# Patient Record
Sex: MALE | Race: WHITE | NOT HISPANIC OR LATINO | Employment: OTHER | URBAN - METROPOLITAN AREA
[De-identification: names, ages, dates, MRNs, and addresses within clinical notes are randomized per-mention and may not be internally consistent; named-entity substitution may affect disease eponyms.]

---

## 2017-01-05 ENCOUNTER — HOSPITAL ENCOUNTER (OUTPATIENT)
Dept: RADIOLOGY | Facility: MEDICAL CENTER | Age: 67
DRG: 267 | End: 2017-01-05
Attending: INTERNAL MEDICINE | Admitting: INTERNAL MEDICINE
Payer: MEDICARE

## 2017-01-05 DIAGNOSIS — Z01.811 PRE-OPERATIVE RESPIRATORY EXAMINATION: ICD-10-CM

## 2017-01-05 DIAGNOSIS — Z01.812 PRE-OPERATIVE LABORATORY EXAMINATION: ICD-10-CM

## 2017-01-05 DIAGNOSIS — Z01.83 ENCOUNTER FOR BLOOD TYPING: ICD-10-CM

## 2017-01-05 DIAGNOSIS — Z01.810 PRE-OPERATIVE CARDIOVASCULAR EXAMINATION: ICD-10-CM

## 2017-01-05 LAB
ABO GROUP BLD: NORMAL
ALBUMIN SERPL BCP-MCNC: 4.2 G/DL (ref 3.2–4.9)
ALBUMIN/GLOB SERPL: 1.4 G/DL
ALP SERPL-CCNC: 54 U/L (ref 30–99)
ALT SERPL-CCNC: 23 U/L (ref 2–50)
ANION GAP SERPL CALC-SCNC: 6 MMOL/L (ref 0–11.9)
APPEARANCE UR: CLEAR
AST SERPL-CCNC: 20 U/L (ref 12–45)
BILIRUB SERPL-MCNC: 1.3 MG/DL (ref 0.1–1.5)
BILIRUB UR QL STRIP.AUTO: NEGATIVE
BLD GP AB SCN SERPL QL: NORMAL
BNP SERPL-MCNC: 155 PG/ML (ref 0–100)
BUN SERPL-MCNC: 16 MG/DL (ref 8–22)
CALCIUM SERPL-MCNC: 9.6 MG/DL (ref 8.5–10.5)
CHLORIDE SERPL-SCNC: 104 MMOL/L (ref 96–112)
CO2 SERPL-SCNC: 31 MMOL/L (ref 20–33)
COLOR UR: YELLOW
CREAT SERPL-MCNC: 0.92 MG/DL (ref 0.5–1.4)
CULTURE IF INDICATED INDCX: NO UA CULTURE
ERYTHROCYTE [DISTWIDTH] IN BLOOD BY AUTOMATED COUNT: 56.1 FL (ref 35.9–50)
GFR SERPL CREATININE-BSD FRML MDRD: >60 ML/MIN/1.73 M 2
GLOBULIN SER CALC-MCNC: 3 G/DL (ref 1.9–3.5)
GLUCOSE SERPL-MCNC: 106 MG/DL (ref 65–99)
GLUCOSE UR STRIP.AUTO-MCNC: NEGATIVE MG/DL
HCT VFR BLD AUTO: 41.3 % (ref 42–52)
HGB BLD-MCNC: 13 G/DL (ref 14–18)
HYALINE CASTS #/AREA URNS LPF: ABNORMAL /LPF
INR PPP: 1.3 (ref 0.87–1.13)
KETONES UR STRIP.AUTO-MCNC: NEGATIVE MG/DL
LEUKOCYTE ESTERASE UR QL STRIP.AUTO: NEGATIVE
MCH RBC QN AUTO: 29.1 PG (ref 27–33)
MCHC RBC AUTO-ENTMCNC: 31.5 G/DL (ref 33.7–35.3)
MCV RBC AUTO: 92.4 FL (ref 81.4–97.8)
MICRO URNS: ABNORMAL
MUCOUS THREADS #/AREA URNS HPF: ABNORMAL /HPF
NITRITE UR QL STRIP.AUTO: NEGATIVE
PH UR STRIP.AUTO: 5.5 [PH]
PLATELET # BLD AUTO: 143 K/UL (ref 164–446)
PMV BLD AUTO: 11.5 FL (ref 9–12.9)
POTASSIUM SERPL-SCNC: 3.6 MMOL/L (ref 3.6–5.5)
PROT SERPL-MCNC: 7.2 G/DL (ref 6–8.2)
PROT UR QL STRIP: 50 MG/DL
PROTHROMBIN TIME: 16.6 SEC (ref 12–14.6)
RBC # BLD AUTO: 4.47 M/UL (ref 4.7–6.1)
RBC # URNS HPF: ABNORMAL /HPF
RBC UR QL AUTO: NEGATIVE
RH BLD: NORMAL
SODIUM SERPL-SCNC: 141 MMOL/L (ref 135–145)
SP GR UR STRIP.AUTO: 1.02
WBC # BLD AUTO: 9.8 K/UL (ref 4.8–10.8)
WBC #/AREA URNS HPF: ABNORMAL /HPF

## 2017-01-05 PROCEDURE — 80053 COMPREHEN METABOLIC PANEL: CPT

## 2017-01-05 PROCEDURE — 86901 BLOOD TYPING SEROLOGIC RH(D): CPT

## 2017-01-05 PROCEDURE — 86900 BLOOD TYPING SEROLOGIC ABO: CPT

## 2017-01-05 PROCEDURE — 86850 RBC ANTIBODY SCREEN: CPT

## 2017-01-05 PROCEDURE — 83880 ASSAY OF NATRIURETIC PEPTIDE: CPT

## 2017-01-05 PROCEDURE — 81001 URINALYSIS AUTO W/SCOPE: CPT

## 2017-01-05 PROCEDURE — 85027 COMPLETE CBC AUTOMATED: CPT

## 2017-01-05 PROCEDURE — 85610 PROTHROMBIN TIME: CPT

## 2017-01-05 PROCEDURE — 36415 COLL VENOUS BLD VENIPUNCTURE: CPT

## 2017-01-05 PROCEDURE — 71020 DX-CHEST-2 VIEWS: CPT

## 2017-01-06 ENCOUNTER — RESOLUTE PROFESSIONAL BILLING HOSPITAL PROF FEE (OUTPATIENT)
Dept: CARDIOLOGY | Facility: MEDICAL CENTER | Age: 67
End: 2017-01-06
Payer: MEDICARE

## 2017-01-06 ENCOUNTER — TELEPHONE (OUTPATIENT)
Dept: CARDIOLOGY | Facility: MEDICAL CENTER | Age: 67
End: 2017-01-06

## 2017-01-06 LAB — EKG IMPRESSION: NORMAL

## 2017-01-06 NOTE — TELEPHONE ENCOUNTER
Spoke with pt's wife Mary going over TAVR preparation education. Explained that pt is to prep with CHG wipes Sunday night prior to procedure. Per Dr. Laguna patient is to begin holding Eliquis now, taking no Eliquis Sat or Sun.Also discussed pt will not eat or drink past midnight on Sunday EXCEPT Monday 1/9/17 @ 0500 he will still take his synthroid with just enough water to take his medication. Explained that pt should leave all jewelry and valuables at home, wear clothes that are easy to remove as he will be changing into hospital gown upon checking in. Patient can have family and friends in the pre-op area until he is brought back to OR. Patient's visitors are welcome to wait on first floor [place] during the procedure. Explained that once TAVR is complete Dr. Stanton, Dr. Laguna, and TUNDE Flowers will update them at first floor [place] waiting area. After update of procedure by team there will be approximately another 45 mins until family can come back to pt's room as patient will still be in transfer and being settled into the room. Explained that depending on patient recovery he can expect to d/c home as jose carlos as post op day one, longer if medically necessary. We discussed the option for cardiac rehab post operatively. Mary is interested in assuring that pt is enrolled in cardiac rehab post operatively to assure he has a great recovery. Mary states understanding and that she has no further questions at this time. Mary was encouraged to return call with any further questions or concerns.

## 2017-01-09 ENCOUNTER — APPOINTMENT (OUTPATIENT)
Dept: RADIOLOGY | Facility: MEDICAL CENTER | Age: 67
DRG: 267 | End: 2017-01-09
Attending: NURSE PRACTITIONER
Payer: MEDICARE

## 2017-01-09 PROCEDURE — 71010 DX-CHEST-PORTABLE (1 VIEW): CPT

## 2017-01-10 ENCOUNTER — APPOINTMENT (OUTPATIENT)
Dept: RADIOLOGY | Facility: MEDICAL CENTER | Age: 67
DRG: 267 | End: 2017-01-10
Attending: NURSE PRACTITIONER
Payer: MEDICARE

## 2017-01-10 PROBLEM — Z95.2 S/P TAVR (TRANSCATHETER AORTIC VALVE REPLACEMENT): Status: ACTIVE | Noted: 2017-01-10

## 2017-01-10 PROCEDURE — 99238 HOSP IP/OBS DSCHRG MGMT 30/<: CPT | Performed by: INTERNAL MEDICINE

## 2017-01-10 PROCEDURE — 71010 DX-CHEST-PORTABLE (1 VIEW): CPT

## 2017-01-13 ENCOUNTER — OFFICE VISIT (OUTPATIENT)
Dept: CARDIOLOGY | Facility: MEDICAL CENTER | Age: 67
End: 2017-01-13
Payer: MEDICARE

## 2017-01-13 VITALS
HEIGHT: 75 IN | SYSTOLIC BLOOD PRESSURE: 138 MMHG | DIASTOLIC BLOOD PRESSURE: 78 MMHG | OXYGEN SATURATION: 93 % | BODY MASS INDEX: 39.17 KG/M2 | HEART RATE: 77 BPM | WEIGHT: 315 LBS

## 2017-01-13 DIAGNOSIS — Z95.2 S/P TAVR (TRANSCATHETER AORTIC VALVE REPLACEMENT): ICD-10-CM

## 2017-01-13 DIAGNOSIS — I48.0 PAROXYSMAL ATRIAL FIBRILLATION (HCC): ICD-10-CM

## 2017-01-13 DIAGNOSIS — J44.89 OBSTRUCTIVE CHRONIC BRONCHITIS WITHOUT EXACERBATION: ICD-10-CM

## 2017-01-13 DIAGNOSIS — I10 ESSENTIAL HYPERTENSION, MALIGNANT: ICD-10-CM

## 2017-01-13 DIAGNOSIS — I82.409 DEEP VEIN THROMBOSIS (DVT) OF LOWER EXTREMITY, UNSPECIFIED CHRONICITY, UNSPECIFIED LATERALITY, UNSPECIFIED VEIN (HCC): ICD-10-CM

## 2017-01-13 DIAGNOSIS — I50.32 CHRONIC DIASTOLIC CONGESTIVE HEART FAILURE (HCC): ICD-10-CM

## 2017-01-13 DIAGNOSIS — Z79.01 CHRONIC ANTICOAGULATION: ICD-10-CM

## 2017-01-13 DIAGNOSIS — E78.5 DYSLIPIDEMIA: ICD-10-CM

## 2017-01-13 DIAGNOSIS — Z86.711 HISTORY OF PULMONARY EMBOLISM: ICD-10-CM

## 2017-01-13 LAB — EKG IMPRESSION: NORMAL

## 2017-01-13 PROCEDURE — 99214 OFFICE O/P EST MOD 30 MIN: CPT | Performed by: NURSE PRACTITIONER

## 2017-01-13 PROCEDURE — 93000 ELECTROCARDIOGRAM COMPLETE: CPT | Performed by: NURSE PRACTITIONER

## 2017-01-13 RX ORDER — NEOMYCIN SULFATE, POLYMYXIN B SULFATE, AND DEXAMETHASONE 3.5; 10000; 1 MG/G; [USP'U]/G; MG/G
OINTMENT OPHTHALMIC
COMMUNITY
Start: 2016-11-14 | End: 2017-01-29

## 2017-01-13 ASSESSMENT — ENCOUNTER SYMPTOMS
CLAUDICATION: 0
DIZZINESS: 0
PND: 0
SHORTNESS OF BREATH: 0
COUGH: 0
FEVER: 0
MYALGIAS: 0
ORTHOPNEA: 0
ABDOMINAL PAIN: 0
PALPITATIONS: 0

## 2017-01-13 NOTE — MR AVS SNAPSHOT
"        Praveen Khalil   2017 4:00 PM   Office Visit   MRN: [id number]    Department:  [facility]   Dept Phone:  [phone]    Description:  Male : 1950   Provider:  ANALY Garza           Reason for Visit     Follow-Up           Allergies as of 2017     Allergen Noted Reactions    Nkda [No Known Drug Allergy] 2007         You were diagnosed with     Paroxysmal atrial fibrillation (CMS-HCC)   [771158]       Chronic anticoagulation   [426419]       Chronic diastolic congestive heart failure (CMS-HCC)   [886104]       Deep vein thrombosis (DVT) of lower extremity, unspecified chronicity, unspecified laterality, unspecified vein ([facility])   [9309438]       Dyslipidemia   [500740]       Essential hypertension, malignant   [401.0.ICD-9-CM]       History of pulmonary embolism   [960794]       Obstructive chronic bronchitis without exacerbation (CMS-HCC)   [491.20.ICD-9-CM]       S/P TAVR (transcatheter aortic valve replacement)   [838942]         Vital Signs     Blood Pressure Pulse Height Weight Body Mass Index Oxygen Saturation    138/78 mmHg 77 1.905 m (6' 3\") 152.409 kg (336 lb) 42.00 kg/m2 93%    Smoking Status                   Former Smoker           Basic Information     Date Of Birth Sex Race Ethnicity Preferred Language    1950 Male White Non- English      Your appointments     2017 12:15 PM   ECHO with [facility]   [facility]CH[facility]ARDIOLOGY [facility])    [address]  [place] [address]   [phone]           No prep            2017  9:00 AM   [facility]    [address]  [place] [id number] [phone]              Problem List              ICD-10-CM Priority Class Noted - Resolved    Primary localized osteoarthrosis, pelvic region and thigh M16.10 Low  10/22/2013 - Present    History of pulmonary embolism Z86.711 Medium  " 10/24/2013 - Present    TAZ (obstructive sleep apnea) G47.33 Low  10/24/2013 - Present    Essential hypertension, malignant I10 Medium  10/24/2013 - Present    Gout, arthritis M10.9 Low  10/24/2013 - Present    Hypothyroidism E03.9 Low  10/24/2013 - Present    Obstructive chronic bronchitis without exacerbation (CMS-HCC) J44.9 Medium  10/24/2013 - Present    Obesity hypoventilation syndrome (CMS-HCC) E66.2 Low  10/24/2013 - Present    Neuropathy (CMS-HCC) G62.9 Low  10/24/2013 - Present    S/P hip replacement Z96.649 Low  10/24/2013 - Present    Dyslipidemia E78.5 Medium  10/24/2013 - Present    Chronic diastolic congestive heart failure (CMS-HCC) I50.32 Medium  2016 - Present    Atrial fibrillation (CMS-HCC) I48.91 Medium  2016 - Present    Chronic anticoagulation Z79.01 Medium  2016 - Present    DVT (deep venous thrombosis) (CMS-HCC) I82.409 Medium  2016 - Present    COPD (chronic obstructive pulmonary disease) (CMS-HCC) J44.9 Low  2016 - Present    S/P TAVR (transcatheter aortic valve replacement) Z95.2 Medium  1/10/2017 - Present      Health Maintenance        Date Due Completion Dates    IMM DTaP/Tdap/Td Vaccine (1 - Tdap) 1969 ---    COLONOSCOPY 2000 ---    IMM ZOSTER VACCINE 2010 ---    IMM PNEUMOCOCCAL 65+ (ADULT) LOW/MEDIUM RISK SERIES (2 of 2 - PPSV23) 10/23/2018 2016, 10/23/2013            Results     [facility] EKG (Clinic Performed)      Component    Report    [facility] [place] [facility]    Test Date:  2017  Pt Name:    BRINDA RODRÍGUEZ              Department: [facility]  MRN:        [id number]                      Room:  Gender:     M                            Technician: SHAHZAD  :        1950                   Requested By:GARCIA MARTINEZ  Order #:    [id number]                    Reading MD: Sidney Laguna MD    Measurements  Intervals                                Axis  Rate:       71                           P:  ID:                                       QRS:        -26  QRSD:       120                          T:          95  QT:         400  QTc:        435    Interpretive Statements  ATRIAL FIBRILLATION, V-RATE  56- 79  NONSPECIFIC INTRAVENTRICULAR CONDUCTION DELAY  BASELINE WANDER IN LEAD(S) V3  Compared to ECG 01/10/2017 07:17:34  No significant changes    Electronically Signed On 1- 17:00:34 PST by Sidney Laguna MD                          Current Immunizations     13-VALENT PCV PREVNAR 9/23/2016    Influenza Vaccine Quad Inj (Pf) 9/23/2016    Pneumococcal Vaccine (UF)Historical Data 8/1/2015    Pneumococcal polysaccharide vaccine (PPSV-23) 10/23/2013  6:29 AM      Below and/or attached are the medications your provider expects you to take. Review all of your home medications and newly ordered medications with your provider and/or pharmacist. Follow medication instructions as directed by your provider and/or pharmacist. Please keep your medication list with you and share with your provider. Update the information when medications are discontinued, doses are changed, or new medications (including over-the-counter products) are added; and carry medication information at all times in the event of emergency situations     Allergies:  NKDA - (reactions not documented)               Medications  Valid as of: January 13, 2017 -  5:01 PM    Generic Name Brand Name Tablet Size Instructions for use    Aliskiren Fumarate (Tab) TEKTURNA 300 MG Take 300 mg by mouth every day.        Allopurinol (Tab) ZYLOPRIM 300 MG Take 300 mg by mouth every day.        Apixaban (Tab) ELIQUIS 5mg Take 5 mg by mouth 2 Times a Day.        Aspirin (Tablet Delayed Response) ECOTRIN 81 MG Take 81 mg by mouth every day.        Bumetanide (Tab) BUMEX 1 MG Take 1 mg by mouth 2 times a day.        Calcium Carbonate-Vitamin D (Tab) OSCAL + D 500-200 MG-UNIT Take 2 Tabs by mouth every morning with breakfast.        Cholecalciferol (Tab) vitamin D 2000 UNIT Take 2,000 Units by mouth  every day.        Coenzyme Q10 (Cap) Coenzyme Q10 400 MG Take 1 Cap by mouth every day.        Digoxin (Tab) LANOXIN 250 MCG Take 250 mcg by mouth every day.        DiltiaZEM HCl (CAPSULE SR 24 HR) DILACOR  MG Take 240 mg by mouth every day.        Escitalopram Oxalate (Tab) LEXAPRO 10 MG Take 10 mg by mouth every day.        Glucos-MSM-C-Fk-Esgzpi-Aeqskq   Take 1 Tab by mouth every day.        Levothyroxine Sodium (Tab) SYNTHROID 50 MCG Take 50 mcg by mouth every day.        Metoprolol Tartrate (Tab) LOPRESSOR 50 MG Take 50 mg by mouth 3 times a day, with meals.        Multiple Vitamins-Minerals   Take 1 Tab by mouth every day.        Neomycin-Polymyxin-Dexameth (Ointment) MAXITROL 3.5-96129-9.1         Potassium Chloride (Pack) KLOR-CON 20 MEQ Take 20 mEq by mouth 2 times a day as needed. When taking lasix prn        Simvastatin (Tab) ZOCOR 40 MG Take 40 mg by mouth every evening.        Triazolam (Tab) HALCION 0.125 MG Take 0.125 mg by mouth at bedtime as needed.        .                 Medicines prescribed today were sent to:     [id number] - [place], [place] - [address] [address]    [address] [address] [address]    Phone: [phone] Fax: [phone]    Open 24 Hours?: No    [facility] - [place], [place] - [address] [place] [address]    [address] [address]    Phone: [phone] Fax: [phone]    Open 24 Hours?: No      Medication refill instructions:       If your prescription bottle indicates you have medication refills left, it is not necessary to call your provider’s office. Please contact your pharmacy and they will refill your medication.    If your prescription bottle indicates you do not have any refills left, you may request refills at any time through one of the following ways: The online Contentful system (except Urgent Care), by calling your provider’s office, or by asking your pharmacy to contact your provider’s office with a refill request. Medication refills  are processed only during regular business hours and may not be available until the next business day. Your provider may request additional information or to have a follow-up visit with you prior to refilling your medication.   *Please Note: Medication refills are assigned a new Rx number when refilled electronically. Your pharmacy may indicate that no refills were authorized even though a new prescription for the same medication is available at the pharmacy. Please request the medicine by name with the pharmacy before contacting your provider for a refill.        Referral     A referral request has been sent to our patient care coordination department. Please allow 3-5 business days for us to process this request and contact you either by phone or mail. If you do not hear from us by the 5th business day, please call us at [phone].           Trellise Access Code: [email or web address]  Expires: 2/9/2017  2:22 PM    Trellise  A secure, online tool to manage your health information     [employer]’s Trellise® is a secure, online tool that connects you to your personalized health information from the privacy of your home -- day or night - making it very easy for you to manage your healthcare. Once the activation process is completed, you can even access your medical information using the Trellise guerrero, which is available for free in the Apple Guerrero store or Google Play store.     Trellise provides the following levels of access (as shown below):   My Chart Features   [facility] Doctor [facility]  Urgent  Care Non-[facility]  Primary Care  Doctor   Email your healthcare team securely and privately 24/7 X X X    Manage appointments: schedule your next appointment; view details of past/upcoming appointments X      Request prescription refills. X      View recent personal medical records, including lab and immunizations X X X X   View health record, including health history, allergies, medications X X X X    Read reports about your outpatient visits, procedures, consult and ER notes X X X X   See your discharge summary, which is a recap of your hospital and/or ER visit that includes your diagnosis, lab results, and care plan. X X       How to register for Zervant:  1. Go to  [email or web address].  2. Click on the Sign Up Now box, which takes you to the New Member Sign Up page. You will need to provide the following information:  a. Enter your Zervant Access Code exactly as it appears at the top of this page. (You will not need to use this code after you’ve completed the sign-up process. If you do not sign up before the expiration date, you must request a new code.)   b. Enter your date of birth.   c. Enter your home email address.   d. Click Submit, and follow the next screen’s instructions.  3. Create a Zervant ID. This will be your Zervant login ID and cannot be changed, so think of one that is secure and easy to remember.  4. Create a Zervant password. You can change your password at any time.  5. Enter your Password Reset Question and Answer. This can be used at a later time if you forget your password.   6. Enter your e-mail address. This allows you to receive e-mail notifications when new information is available in Zervant.  7. Click Sign Up. You can now view your health information.    For assistance activating your Zervant account, call [phone]

## 2017-01-13 NOTE — Clinical Note
"     [facility]   [address]  [place] [address]  Phone: [phone]  Fax: [phone]              Praveen Khalil  1950    Encounter Date: 1/13/2017    ANALY Garza          PROGRESS NOTE:  Subjective:   Praveen Khalil is a 66 y.o. male who presents today for HFU S/P TAVR.    He is a patient of Dr. Ureña. Hx of DVT and PE, afib on Eliquis, severe AS with recent TAVR, TAZ with biPAP and oxygen, HTN, HLD, and obesity.    He is overall doing well. We discussed post-op care and his groin sites are observed and clean, dry, and intact.    He does continue to use his scooter for mobility but is able to use a walker. He wants to start getting healthier and losing weight.    He does have some peripheral edema. He does have exertional shortness of breath that is improving.    He has had no episodes of chest pain or palpitations.    Past Medical History   Diagnosis Date   • Pulmonary embolism (CMS-HCC)    • Hypertension    • Kidney stone    • Aortic stenosis      TAVR 1/9/17   • Anesthesia      \"Apnea at times with deep sedation\"    • Arthritis      osteo- hands   • Congestive heart failure (CMS-HCC)    • Sleep apnea      BiPAP with oxygen 2.5 L   • Cataract      bilat IOL    • Psychiatric problem      depression, anxiety    • Gout    • Atrial fibrillation (CMS-HCC)      A Fib    • Disorder of thyroid      Past Surgical History   Procedure Laterality Date   • Lumbar laminectomy diskectomy  2007   • Tonsillectomy     • Hip arthroplasty total  10/22/2013     Performed by Nader Barr M.D. at SURGERY [facility]   • Cataract extraction with iol     • Dupuytren contracture release Right 2014   • Transcatheter aortic valve replacement  1/9/2017     Procedure: TRANSCATHETER AORTIC VALVE REPLACEMENT WITH SCOUT;  Surgeon: Sidney Laguna M.D.;  Location: [facility];  Service:      Family History   Problem Relation Age of " Onset   • No Known Problems Mother    • No Known Problems Father      History   Smoking status   • Former Smoker -- 0.75 packs/day for 40 years   • Types: Cigarettes, Pipe   • Quit date: 03/01/2007   Smokeless tobacco   • Never Used     Comment: quit in march 2007     Allergies   Allergen Reactions   • Nkda [No Known Drug Allergy]      Outpatient Encounter Prescriptions as of 1/13/2017   Medication Sig Dispense Refill   • calcium carbonate-vitamin D (OSCAL 500/200 D-3) 500-200 MG-UNIT Tab Take 2 Tabs by mouth every morning with breakfast.     • metoprolol (LOPRESSOR) 50 MG Tab Take 50 mg by mouth 3 times a day, with meals.     • aspirin EC (ECOTRIN) 81 MG Tablet Delayed Response Take 81 mg by mouth every day.     • Cholecalciferol (VITAMIN D) 2000 UNIT Tab Take 2,000 Units by mouth every day.     • Coenzyme Q10 (CO Q-10 MAXIMUM STRENGTH) 400 MG Cap Take 1 Cap by mouth every day.     • bumetanide (BUMEX) 1 MG Tab Take 1 mg by mouth 2 times a day.     • digoxin (LANOXIN) 250 MCG Tab Take 250 mcg by mouth every day.     • diltiazem (DILACOR XR) 240 MG XR capsule Take 240 mg by mouth every day.     • apixaban (ELIQUIS) 5mg Tab Take 5 mg by mouth 2 Times a Day.     • escitalopram (LEXAPRO) 10 MG Tab Take 10 mg by mouth every day.     • triazolam (HALCION) 0.125 MG tablet Take 0.125 mg by mouth at bedtime as needed.     • levothyroxine (SYNTHROID) 50 MCG TABS Take 50 mcg by mouth every day.     • aliskiren (TEKTURNA) 300 MG tablet Take 300 mg by mouth every day.     • potassium chloride (KLOR-CON) 20 MEQ PACK Take 20 mEq by mouth 2 times a day as needed. When taking lasix prn     • allopurinol (ZYLOPRIM) 300 MG TABS Take 300 mg by mouth every day.     • simvastatin (ZOCOR) 40 MG TABS Take 40 mg by mouth every evening.     • Multiple Vitamins-Minerals (MULTIVITAL PO) Take 1 Tab by mouth every day.     • GLUCOSAMINE MSM COMPLEX PO Take 1 Tab by mouth every day.     • neomycin-polymixin-dexamethasone (MAXITROL) 3.5-02519-4.1  "Ointment ophthalmic ointment        No facility-administered encounter medications on file as of 1/13/2017.     Review of Systems   Constitutional: Negative for fever and malaise/fatigue.        Uses scooter for mobility    Respiratory: Negative for cough and shortness of breath.    Cardiovascular: Negative for chest pain, palpitations, orthopnea, claudication, leg swelling and PND.   Gastrointestinal: Negative for abdominal pain.   Musculoskeletal: Positive for joint pain. Negative for myalgias.        R ankle foot drop   Neurological: Negative for dizziness.   All other systems reviewed and are negative.       Objective:   /78 mmHg  Pulse 77  Ht 1.905 m (6' 3\")  Wt 152.409 kg (336 lb)  BMI 42.00 kg/m2  SpO2 93%    Physical Exam   Constitutional: He is oriented to person, place, and time. He appears well-developed. No distress.   obese   HENT:   Head: Normocephalic and atraumatic.   Eyes: EOM are normal.   Neck: Normal range of motion. No JVD present.   Cardiovascular: Normal rate, normal heart sounds, intact distal pulses and normal pulses.  An irregularly irregular rhythm present.   No murmur heard.  Pulmonary/Chest: Effort normal and breath sounds normal. No respiratory distress. He has no wheezes. He has no rales.   Abdominal: Soft. Bowel sounds are normal.   Musculoskeletal:   Mild edema bilateral lower extremities; scooter/walker for mobility    Neurological: He is alert and oriented to person, place, and time.   Skin: Skin is warm and dry.   Psychiatric: He has a normal mood and affect.   Nursing note and vitals reviewed.    Lab Results   Component Value Date/Time    CHOLESTEROL, 12/26/2009 05:30 AM    LDL 71 12/26/2009 05:30 AM    HDL 46 12/26/2009 05:30 AM    TRIGLYCERIDES 48 12/26/2009 05:30 AM       Lab Results   Component Value Date/Time    SODIUM 139 01/10/2017 02:26 AM    POTASSIUM 4.0 01/10/2017 02:26 AM    CHLORIDE 104 01/10/2017 02:26 AM    CO2 29 01/10/2017 02:26 AM    GLUCOSE " 146* 01/10/2017 02:26 AM    BUN 22 01/10/2017 02:26 AM    CREATININE 1.09 01/10/2017 02:26 AM    BUN-CREATININE RATIO 19 11/28/2016 02:41 PM     Lab Results   Component Value Date/Time    ALKALINE PHOSPHATASE 47 01/10/2017 02:26 AM    AST(SGOT) 20 01/10/2017 02:26 AM    ALT(SGPT) 15 01/10/2017 02:26 AM    TOTAL BILIRUBIN 1.2 01/10/2017 02:26 AM      2017 ONE DAY POST TAVR ECHO CONCLUSIONS  Normal left ventricular chamber size.  Left ventricular ejection fraction is visually estimated to be 65%.  Moderate concentric left ventricular hypertrophy.  Mild mitral stenosis.  Moderate mitral regurgitation.  Known TAVR aortic valve that is functioning normally with normal   transvalvular gradients.  Transvalvular gradients are - Peak: 19 mmHg, Mean: 12 mmHg.  No evidence of paravalvular leak noted.  Estimated right ventricular systolic pressure is 40 mmHg.    Assessment:     1. Paroxysmal atrial fibrillation (CMS-HCC)  REFERRAL TO INTENSIVE CARDIAC REHAB/CARDIAC REHAB    [facility] EKG (Clinic Performed)   2. Chronic anticoagulation  REFERRAL TO INTENSIVE CARDIAC REHAB/CARDIAC REHAB   3. Chronic diastolic congestive heart failure (CMS-HCC)  REFERRAL TO INTENSIVE CARDIAC REHAB/CARDIAC REHAB   4. Deep vein thrombosis (DVT) of lower extremity, unspecified chronicity, unspecified laterality, unspecified vein ([facility])  REFERRAL TO INTENSIVE CARDIAC REHAB/CARDIAC REHAB   5. Dyslipidemia  REFERRAL TO INTENSIVE CARDIAC REHAB/CARDIAC REHAB   6. Essential hypertension, malignant  REFERRAL TO INTENSIVE CARDIAC REHAB/CARDIAC REHAB   7. History of pulmonary embolism  REFERRAL TO INTENSIVE CARDIAC REHAB/CARDIAC REHAB   8. Obstructive chronic bronchitis without exacerbation (CMS-HCC)  REFERRAL TO INTENSIVE CARDIAC REHAB/CARDIAC REHAB   9. S/P TAVR (transcatheter aortic valve replacement)  REFERRAL TO INTENSIVE CARDIAC REHAB/CARDIAC REHAB     Medical Decision Making:  Today's Assessment / Status / Plan:     1. PAF, persistent and now rate  controlled on EKG at 70. Continue metoprolol 50 mg TID, dig 250 mcg, and diltiazem 240 mg QD. Eliquis for OAC. Asymptomatic to afib.    2. Diastolic HF, improving symptoms. Continue bumex 1 mg BID and K. Follow clinically and with repeat echo in 1 month.    3. DVT and PE, Eliquis lifelong.     4. HLD, statin. LDL goal <100 with questionable diabetes. FU with primary cardiologist for management.    5. COPD?, oxygen re-tested today in office. No longer needs oxygen with mobility but does need to continue at night per pulmonary recommendations. He will continue to monitor his pulse ox at home. FU with PCP regarding COPD diagnosis and abnormal PFT.    6. Severe AS with recent TAVR, doing well. Groin sites CDI. Repeat echo 1 month. Continue Eliquis BID and baby ASA (3 months only).    FU in clinic in 1 month with JI with repeat echo; FU with PCP regarding elevated blood sugars in the hospital.    Patient verbalizes understanding and agrees with the plan of care.     Collaborating MD: Luz Elena CHAND    Spent 45 minutes in face-to-face patient contact in which greater than 50% of the visit was spent in counseling/coordination of care of medication management, symptom management, re-assurance, discussion of post-op care, EKG done, groin sites evaluated, and discussion about future, echo ordered.        No Recipients

## 2017-01-14 NOTE — PROGRESS NOTES
"Subjective:   Praveen Khalil is a 66 y.o. male who presents today for HFU S/P TAVR.    He is a patient of Dr. Ureña. Hx of DVT and PE, afib on Eliquis, severe AS with recent TAVR, TAZ with biPAP and oxygen, HTN, HLD, and obesity.    He is overall doing well. We discussed post-op care and his groin sites are observed and clean, dry, and intact.    He does continue to use his scooter for mobility but is able to use a walker. He wants to start getting healthier and losing weight.    He does have some peripheral edema. He does have exertional shortness of breath that is improving.    He has had no episodes of chest pain or palpitations.    Past Medical History   Diagnosis Date   • Pulmonary embolism (CMS-HCC)    • Hypertension    • Kidney stone    • Aortic stenosis      TAVR 1/9/17   • Anesthesia      \"Apnea at times with deep sedation\"    • Arthritis      osteo- hands   • Congestive heart failure (CMS-HCC)    • Sleep apnea      BiPAP with oxygen 2.5 L   • Cataract      bilat IOL    • Psychiatric problem      depression, anxiety    • Gout    • Atrial fibrillation (CMS-HCC)      A Fib    • Disorder of thyroid      Past Surgical History   Procedure Laterality Date   • Lumbar laminectomy diskectomy  2007   • Tonsillectomy     • Hip arthroplasty total  10/22/2013     Performed by Nader Barr M.D. at SURGERY [facility]   • Cataract extraction with iol     • Dupuytren contracture release Right 2014   • Transcatheter aortic valve replacement  1/9/2017     Procedure: TRANSCATHETER AORTIC VALVE REPLACEMENT WITH SCOUT;  Surgeon: Sidney Laguna M.D.;  Location: SURGERY [facility];  Service:      Family History   Problem Relation Age of Onset   • No Known Problems Mother    • No Known Problems Father      History   Smoking status   • Former Smoker -- 0.75 packs/day for 40 years   • Types: Cigarettes, Pipe   • Quit date: 03/01/2007   Smokeless tobacco   • Never Used     Comment: quit in march 2007     Allergies "   Allergen Reactions   • Nkda [No Known Drug Allergy]      Outpatient Encounter Prescriptions as of 1/13/2017   Medication Sig Dispense Refill   • calcium carbonate-vitamin D (OSCAL 500/200 D-3) 500-200 MG-UNIT Tab Take 2 Tabs by mouth every morning with breakfast.     • metoprolol (LOPRESSOR) 50 MG Tab Take 50 mg by mouth 3 times a day, with meals.     • aspirin EC (ECOTRIN) 81 MG Tablet Delayed Response Take 81 mg by mouth every day.     • Cholecalciferol (VITAMIN D) 2000 UNIT Tab Take 2,000 Units by mouth every day.     • Coenzyme Q10 (CO Q-10 MAXIMUM STRENGTH) 400 MG Cap Take 1 Cap by mouth every day.     • bumetanide (BUMEX) 1 MG Tab Take 1 mg by mouth 2 times a day.     • digoxin (LANOXIN) 250 MCG Tab Take 250 mcg by mouth every day.     • diltiazem (DILACOR XR) 240 MG XR capsule Take 240 mg by mouth every day.     • apixaban (ELIQUIS) 5mg Tab Take 5 mg by mouth 2 Times a Day.     • escitalopram (LEXAPRO) 10 MG Tab Take 10 mg by mouth every day.     • triazolam (HALCION) 0.125 MG tablet Take 0.125 mg by mouth at bedtime as needed.     • levothyroxine (SYNTHROID) 50 MCG TABS Take 50 mcg by mouth every day.     • aliskiren (TEKTURNA) 300 MG tablet Take 300 mg by mouth every day.     • potassium chloride (KLOR-CON) 20 MEQ PACK Take 20 mEq by mouth 2 times a day as needed. When taking lasix prn     • allopurinol (ZYLOPRIM) 300 MG TABS Take 300 mg by mouth every day.     • simvastatin (ZOCOR) 40 MG TABS Take 40 mg by mouth every evening.     • Multiple Vitamins-Minerals (MULTIVITAL PO) Take 1 Tab by mouth every day.     • GLUCOSAMINE MSM COMPLEX PO Take 1 Tab by mouth every day.     • neomycin-polymixin-dexamethasone (MAXITROL) 3.5-22362-2.1 Ointment ophthalmic ointment        No facility-administered encounter medications on file as of 1/13/2017.     Review of Systems   Constitutional: Negative for fever and malaise/fatigue.        Uses scooter for mobility    Respiratory: Negative for cough and shortness of  "breath.    Cardiovascular: Negative for chest pain, palpitations, orthopnea, claudication, leg swelling and PND.   Gastrointestinal: Negative for abdominal pain.   Musculoskeletal: Positive for joint pain. Negative for myalgias.        R ankle foot drop   Neurological: Negative for dizziness.   All other systems reviewed and are negative.       Objective:   /78 mmHg  Pulse 77  Ht 1.905 m (6' 3\")  Wt 152.409 kg (336 lb)  BMI 42.00 kg/m2  SpO2 93%    Physical Exam   Constitutional: He is oriented to person, place, and time. He appears well-developed. No distress.   obese   HENT:   Head: Normocephalic and atraumatic.   Eyes: EOM are normal.   Neck: Normal range of motion. No JVD present.   Cardiovascular: Normal rate, normal heart sounds, intact distal pulses and normal pulses.  An irregularly irregular rhythm present.   No murmur heard.  Pulmonary/Chest: Effort normal and breath sounds normal. No respiratory distress. He has no wheezes. He has no rales.   Abdominal: Soft. Bowel sounds are normal.   Musculoskeletal:   Mild edema bilateral lower extremities; scooter/walker for mobility    Neurological: He is alert and oriented to person, place, and time.   Skin: Skin is warm and dry.   Psychiatric: He has a normal mood and affect.   Nursing note and vitals reviewed.    Lab Results   Component Value Date/Time    CHOLESTEROL, 12/26/2009 05:30 AM    LDL 71 12/26/2009 05:30 AM    HDL 46 12/26/2009 05:30 AM    TRIGLYCERIDES 48 12/26/2009 05:30 AM       Lab Results   Component Value Date/Time    SODIUM 139 01/10/2017 02:26 AM    POTASSIUM 4.0 01/10/2017 02:26 AM    CHLORIDE 104 01/10/2017 02:26 AM    CO2 29 01/10/2017 02:26 AM    GLUCOSE 146* 01/10/2017 02:26 AM    BUN 22 01/10/2017 02:26 AM    CREATININE 1.09 01/10/2017 02:26 AM    BUN-CREATININE RATIO 19 11/28/2016 02:41 PM     Lab Results   Component Value Date/Time    ALKALINE PHOSPHATASE 47 01/10/2017 02:26 AM    AST(SGOT) 20 01/10/2017 02:26 AM    " ALT(SGPT) 15 01/10/2017 02:26 AM    TOTAL BILIRUBIN 1.2 01/10/2017 02:26 AM      2017 ONE DAY POST TAVR ECHO CONCLUSIONS  Normal left ventricular chamber size.  Left ventricular ejection fraction is visually estimated to be 65%.  Moderate concentric left ventricular hypertrophy.  Mild mitral stenosis.  Moderate mitral regurgitation.  Known TAVR aortic valve that is functioning normally with normal   transvalvular gradients.  Transvalvular gradients are - Peak: 19 mmHg, Mean: 12 mmHg.  No evidence of paravalvular leak noted.  Estimated right ventricular systolic pressure is 40 mmHg.    Assessment:     1. Paroxysmal atrial fibrillation (CMS-HCC)  REFERRAL TO INTENSIVE CARDIAC REHAB/CARDIAC REHAB    [facility] (Clinic Performed)   2. Chronic anticoagulation  REFERRAL TO INTENSIVE CARDIAC REHAB/CARDIAC REHAB   3. Chronic diastolic congestive heart failure (CMS-[facility])  REFERRAL TO INTENSIVE CARDIAC REHAB/CARDIAC REHAB   4. Deep vein thrombosis (DVT) of lower extremity, unspecified chronicity, unspecified laterality, unspecified vein (HCC)  REFERRAL TO INTENSIVE CARDIAC REHAB/CARDIAC REHAB   5. Dyslipidemia  REFERRAL TO INTENSIVE CARDIAC REHAB/CARDIAC REHAB   6. Essential hypertension, malignant  REFERRAL TO INTENSIVE CARDIAC REHAB/CARDIAC REHAB   7. History of pulmonary embolism  REFERRAL TO INTENSIVE CARDIAC REHAB/CARDIAC REHAB   8. Obstructive chronic bronchitis without exacerbation (CMS-[facility])  REFERRAL TO INTENSIVE CARDIAC REHAB/CARDIAC REHAB   9. S/P TAVR (transcatheter aortic valve replacement)  REFERRAL TO INTENSIVE CARDIAC REHAB/CARDIAC REHAB     Medical Decision Making:  Today's Assessment / Status / Plan:     1. PAF, persistent and now rate controlled on EKG at 70. Continue metoprolol 50 mg TID, dig 250 mcg, and diltiazem 240 mg QD. Eliquis for OAC. Asymptomatic to afib.    2. Diastolic HF, improving symptoms. Continue bumex 1 mg BID and K. Follow clinically and with repeat echo in 1 month.    3. DVT and PE,  Eliquis lifelong.     4. HLD, statin. LDL goal <100 with questionable diabetes. FU with primary cardiologist for management.    5. COPD?, oxygen re-tested today in office. No longer needs oxygen with mobility but does need to continue at night per pulmonary recommendations. He will continue to monitor his pulse ox at home. FU with PCP regarding COPD diagnosis and abnormal PFT.    6. Severe AS with recent TAVR, doing well. Groin sites CDI. Repeat echo 1 month. Continue Eliquis BID and baby ASA (3 months only).    FU in clinic in 1 month with JI with repeat echo; FU with PCP regarding elevated blood sugars in the hospital.    Patient verbalizes understanding and agrees with the plan of care.     Collaborating MD: Luz Elena CHAND    Spent 45 minutes in face-to-face patient contact in which greater than 50% of the visit was spent in counseling/coordination of care of medication management, symptom management, re-assurance, discussion of post-op care, EKG done, groin sites evaluated, and discussion about future, echo ordered.

## 2017-01-20 ENCOUNTER — TELEPHONE (OUTPATIENT)
Dept: CARDIOLOGY | Facility: MEDICAL CENTER | Age: 67
End: 2017-01-20

## 2017-01-20 NOTE — TELEPHONE ENCOUNTER
----- Message from Rand Watson sent at 1/20/2017  9:40 AM PST -----  Regarding: patient wants a call to discuss cardiac rehab  SC/Raul        Patient would like a call back. He said [facility]el had told him that cardiac rehab would be a 6 week program, he called them and was told it was a 12 week program, and he has a problem with that. He would like a call back at [phone]

## 2017-01-20 NOTE — TELEPHONE ENCOUNTER
S/w SC about this pts concerns. Discussed that intensive cardiac rehab is now offering a 12 week program as this has showed to produce better results and many insurance companies are covering the full 12 weeks. Explained to pt, as SC has suggested when discussing with her, pt can do as much rehab as he wants. Pt also has questions about whether he can just do the physical therapy portion as he doesn't feel the education portion applies to him. Explained that the other components of rehab could be very useful. Advised pt that specific questions would have to call and ask cardiac rehab specifically. Pt appreciates.

## 2017-01-29 ENCOUNTER — HOSPITAL ENCOUNTER (INPATIENT)
Facility: MEDICAL CENTER | Age: 67
LOS: 3 days | DRG: 227 | End: 2017-02-01
Attending: EMERGENCY MEDICINE | Admitting: INTERNAL MEDICINE
Payer: MEDICARE

## 2017-01-29 ENCOUNTER — RESOLUTE PROFESSIONAL BILLING HOSPITAL PROF FEE (OUTPATIENT)
Dept: CARDIOLOGY | Facility: MEDICAL CENTER | Age: 67
End: 2017-01-29
Payer: MEDICARE

## 2017-01-29 ENCOUNTER — APPOINTMENT (OUTPATIENT)
Dept: RADIOLOGY | Facility: MEDICAL CENTER | Age: 67
DRG: 227 | End: 2017-01-29
Attending: EMERGENCY MEDICINE
Payer: MEDICARE

## 2017-01-29 DIAGNOSIS — I20.9 ISCHEMIC CHEST PAIN (HCC): ICD-10-CM

## 2017-01-29 DIAGNOSIS — R55 SYNCOPE, UNSPECIFIED SYNCOPE TYPE: ICD-10-CM

## 2017-01-29 DIAGNOSIS — I47.20 VENTRICULAR TACHYCARDIA (HCC): ICD-10-CM

## 2017-01-29 LAB
ALBUMIN SERPL BCP-MCNC: 4 G/DL (ref 3.2–4.9)
ALBUMIN/GLOB SERPL: 1.3 G/DL
ALP SERPL-CCNC: 66 U/L (ref 30–99)
ALT SERPL-CCNC: 53 U/L (ref 2–50)
ANION GAP SERPL CALC-SCNC: 9 MMOL/L (ref 0–11.9)
APTT PPP: 28.2 SEC (ref 24.7–36)
AST SERPL-CCNC: 70 U/L (ref 12–45)
BASOPHILS # BLD AUTO: 0.6 % (ref 0–1.8)
BASOPHILS # BLD: 0.05 K/UL (ref 0–0.12)
BILIRUB SERPL-MCNC: 0.8 MG/DL (ref 0.1–1.5)
BNP SERPL-MCNC: 106 PG/ML (ref 0–100)
BUN SERPL-MCNC: 26 MG/DL (ref 8–22)
CALCIUM SERPL-MCNC: 9.5 MG/DL (ref 8.5–10.5)
CHLORIDE SERPL-SCNC: 104 MMOL/L (ref 96–112)
CO2 SERPL-SCNC: 26 MMOL/L (ref 20–33)
CREAT SERPL-MCNC: 1.15 MG/DL (ref 0.5–1.4)
DIGOXIN SERPL-MCNC: 0.8 NG/ML (ref 0.8–2)
EKG IMPRESSION: NORMAL
EOSINOPHIL # BLD AUTO: 0.25 K/UL (ref 0–0.51)
EOSINOPHIL NFR BLD: 2.8 % (ref 0–6.9)
ERYTHROCYTE [DISTWIDTH] IN BLOOD BY AUTOMATED COUNT: 56.7 FL (ref 35.9–50)
GFR SERPL CREATININE-BSD FRML MDRD: >60 ML/MIN/1.73 M 2
GLOBULIN SER CALC-MCNC: 3 G/DL (ref 1.9–3.5)
GLUCOSE SERPL-MCNC: 153 MG/DL (ref 65–99)
HCT VFR BLD AUTO: 37.4 % (ref 42–52)
HGB BLD-MCNC: 11.9 G/DL (ref 14–18)
IMM GRANULOCYTES # BLD AUTO: 0.06 K/UL (ref 0–0.11)
IMM GRANULOCYTES NFR BLD AUTO: 0.7 % (ref 0–0.9)
INR PPP: 1.23 (ref 0.87–1.13)
LYMPHOCYTES # BLD AUTO: 1.33 K/UL (ref 1–4.8)
LYMPHOCYTES NFR BLD: 14.6 % (ref 22–41)
MAGNESIUM SERPL-MCNC: 2 MG/DL (ref 1.5–2.5)
MCH RBC QN AUTO: 28.6 PG (ref 27–33)
MCHC RBC AUTO-ENTMCNC: 31.8 G/DL (ref 33.7–35.3)
MCV RBC AUTO: 89.9 FL (ref 81.4–97.8)
MONOCYTES # BLD AUTO: 0.67 K/UL (ref 0–0.85)
MONOCYTES NFR BLD AUTO: 7.4 % (ref 0–13.4)
NEUTROPHILS # BLD AUTO: 6.72 K/UL (ref 1.82–7.42)
NEUTROPHILS NFR BLD: 73.9 % (ref 44–72)
NRBC # BLD AUTO: 0 K/UL
NRBC BLD AUTO-RTO: 0 /100 WBC
PLATELET # BLD AUTO: 107 K/UL (ref 164–446)
PMV BLD AUTO: 10.8 FL (ref 9–12.9)
POTASSIUM SERPL-SCNC: 3.4 MMOL/L (ref 3.6–5.5)
PROT SERPL-MCNC: 7 G/DL (ref 6–8.2)
PROTHROMBIN TIME: 15.9 SEC (ref 12–14.6)
RBC # BLD AUTO: 4.16 M/UL (ref 4.7–6.1)
SODIUM SERPL-SCNC: 139 MMOL/L (ref 135–145)
TROPONIN I SERPL-MCNC: 0.02 NG/ML (ref 0–0.04)
TROPONIN I SERPL-MCNC: 0.37 NG/ML (ref 0–0.04)
TSH SERPL DL<=0.005 MIU/L-ACNC: 2.81 UIU/ML (ref 0.3–3.7)
WBC # BLD AUTO: 9.1 K/UL (ref 4.8–10.8)

## 2017-01-29 PROCEDURE — A9270 NON-COVERED ITEM OR SERVICE: HCPCS | Performed by: INTERNAL MEDICINE

## 2017-01-29 PROCEDURE — 93005 ELECTROCARDIOGRAM TRACING: CPT

## 2017-01-29 PROCEDURE — 99223 1ST HOSP IP/OBS HIGH 75: CPT | Mod: 25 | Performed by: INTERNAL MEDICINE

## 2017-01-29 PROCEDURE — 84443 ASSAY THYROID STIM HORMONE: CPT

## 2017-01-29 PROCEDURE — 71010 DX-CHEST-LIMITED (1 VIEW): CPT

## 2017-01-29 PROCEDURE — 36415 COLL VENOUS BLD VENIPUNCTURE: CPT

## 2017-01-29 PROCEDURE — 93005 ELECTROCARDIOGRAM TRACING: CPT | Performed by: INTERNAL MEDICINE

## 2017-01-29 PROCEDURE — 99291 CRITICAL CARE FIRST HOUR: CPT

## 2017-01-29 PROCEDURE — 83880 ASSAY OF NATRIURETIC PEPTIDE: CPT

## 2017-01-29 PROCEDURE — 770022 HCHG ROOM/CARE - ICU (200)

## 2017-01-29 PROCEDURE — 85025 COMPLETE CBC W/AUTO DIFF WBC: CPT

## 2017-01-29 PROCEDURE — 93010 ELECTROCARDIOGRAM REPORT: CPT | Performed by: INTERNAL MEDICINE

## 2017-01-29 PROCEDURE — 85610 PROTHROMBIN TIME: CPT

## 2017-01-29 PROCEDURE — 99291 CRITICAL CARE FIRST HOUR: CPT | Mod: AI | Performed by: INTERNAL MEDICINE

## 2017-01-29 PROCEDURE — 700102 HCHG RX REV CODE 250 W/ 637 OVERRIDE(OP): Performed by: INTERNAL MEDICINE

## 2017-01-29 PROCEDURE — 83735 ASSAY OF MAGNESIUM: CPT

## 2017-01-29 PROCEDURE — 80162 ASSAY OF DIGOXIN TOTAL: CPT

## 2017-01-29 PROCEDURE — 700111 HCHG RX REV CODE 636 W/ 250 OVERRIDE (IP): Performed by: INTERNAL MEDICINE

## 2017-01-29 PROCEDURE — 80053 COMPREHEN METABOLIC PANEL: CPT

## 2017-01-29 PROCEDURE — 84484 ASSAY OF TROPONIN QUANT: CPT

## 2017-01-29 PROCEDURE — 85730 THROMBOPLASTIN TIME PARTIAL: CPT

## 2017-01-29 RX ORDER — POTASSIUM CHLORIDE 20 MEQ/1
60 TABLET, EXTENDED RELEASE ORAL ONCE
Status: COMPLETED | OUTPATIENT
Start: 2017-01-29 | End: 2017-01-29

## 2017-01-29 RX ORDER — CALCIUM CARBONATE 500(1250)
1000 TABLET ORAL
Status: DISCONTINUED | OUTPATIENT
Start: 2017-01-30 | End: 2017-02-01 | Stop reason: HOSPADM

## 2017-01-29 RX ORDER — ONDANSETRON 2 MG/ML
4 INJECTION INTRAMUSCULAR; INTRAVENOUS EVERY 4 HOURS PRN
Status: DISCONTINUED | OUTPATIENT
Start: 2017-01-29 | End: 2017-02-01 | Stop reason: HOSPADM

## 2017-01-29 RX ORDER — MAGNESIUM SULFATE HEPTAHYDRATE 40 MG/ML
2 INJECTION, SOLUTION INTRAVENOUS ONCE
Status: COMPLETED | OUTPATIENT
Start: 2017-01-29 | End: 2017-01-30

## 2017-01-29 RX ORDER — DILTIAZEM HYDROCHLORIDE 240 MG/1
240 CAPSULE, COATED, EXTENDED RELEASE ORAL EVERY MORNING
Status: DISCONTINUED | OUTPATIENT
Start: 2017-01-30 | End: 2017-01-31

## 2017-01-29 RX ORDER — POTASSIUM CHLORIDE 7.45 MG/ML
10 INJECTION INTRAVENOUS
Status: COMPLETED | OUTPATIENT
Start: 2017-01-29 | End: 2017-01-30

## 2017-01-29 RX ORDER — DEXTROSE MONOHYDRATE 50 MG/ML
500 INJECTION, SOLUTION INTRAVENOUS CONTINUOUS
Status: DISCONTINUED | OUTPATIENT
Start: 2017-01-29 | End: 2017-01-31

## 2017-01-29 RX ORDER — BUMETANIDE 1 MG/1
1 TABLET ORAL
Status: DISCONTINUED | OUTPATIENT
Start: 2017-01-30 | End: 2017-02-01 | Stop reason: HOSPADM

## 2017-01-29 RX ORDER — DIGOXIN 250 MCG
250 TABLET ORAL EVERY MORNING
Status: DISCONTINUED | OUTPATIENT
Start: 2017-01-30 | End: 2017-01-31

## 2017-01-29 RX ORDER — M-VIT,TX,IRON,MINS/CALC/FOLIC 27MG-0.4MG
1 TABLET ORAL DAILY
Status: DISCONTINUED | OUTPATIENT
Start: 2017-01-30 | End: 2017-02-01 | Stop reason: HOSPADM

## 2017-01-29 RX ORDER — ESCITALOPRAM OXALATE 10 MG/1
10 TABLET ORAL DAILY
Status: DISCONTINUED | OUTPATIENT
Start: 2017-01-30 | End: 2017-02-01 | Stop reason: HOSPADM

## 2017-01-29 RX ORDER — POTASSIUM CHLORIDE 20 MEQ/1
20 TABLET, EXTENDED RELEASE ORAL 2 TIMES DAILY
COMMUNITY
End: 2017-02-14 | Stop reason: SDUPTHER

## 2017-01-29 RX ORDER — ALLOPURINOL 300 MG/1
300 TABLET ORAL EVERY MORNING
Status: DISCONTINUED | OUTPATIENT
Start: 2017-01-30 | End: 2017-02-01 | Stop reason: HOSPADM

## 2017-01-29 RX ORDER — ONDANSETRON 4 MG/1
4 TABLET, ORALLY DISINTEGRATING ORAL EVERY 4 HOURS PRN
Status: DISCONTINUED | OUTPATIENT
Start: 2017-01-29 | End: 2017-02-01 | Stop reason: HOSPADM

## 2017-01-29 RX ORDER — ACETAMINOPHEN 325 MG/1
650 TABLET ORAL EVERY 6 HOURS PRN
Status: DISCONTINUED | OUTPATIENT
Start: 2017-01-29 | End: 2017-02-01 | Stop reason: HOSPADM

## 2017-01-29 RX ORDER — POTASSIUM CHLORIDE 20 MEQ/1
20 TABLET, EXTENDED RELEASE ORAL 2 TIMES DAILY
Status: DISCONTINUED | OUTPATIENT
Start: 2017-01-30 | End: 2017-01-31

## 2017-01-29 RX ORDER — SIMVASTATIN 20 MG
40 TABLET ORAL NIGHTLY
Status: DISCONTINUED | OUTPATIENT
Start: 2017-01-29 | End: 2017-02-01 | Stop reason: HOSPADM

## 2017-01-29 RX ORDER — LEVOTHYROXINE SODIUM 0.05 MG/1
50 TABLET ORAL
Status: DISCONTINUED | OUTPATIENT
Start: 2017-01-30 | End: 2017-02-01 | Stop reason: HOSPADM

## 2017-01-29 RX ORDER — METOPROLOL TARTRATE 50 MG/1
50 TABLET, FILM COATED ORAL
Status: DISCONTINUED | OUTPATIENT
Start: 2017-01-29 | End: 2017-02-01 | Stop reason: HOSPADM

## 2017-01-29 RX ORDER — ALISKIREN 300 MG/1
300 TABLET, FILM COATED ORAL DAILY
Status: DISCONTINUED | OUTPATIENT
Start: 2017-01-29 | End: 2017-01-29

## 2017-01-29 RX ORDER — ALISKIREN 150 MG/1
300 TABLET, FILM COATED ORAL
Status: DISCONTINUED | OUTPATIENT
Start: 2017-01-29 | End: 2017-02-01 | Stop reason: HOSPADM

## 2017-01-29 RX ADMIN — POTASSIUM CHLORIDE 10 MEQ: 10 INJECTION, SOLUTION INTRAVENOUS at 22:42

## 2017-01-29 RX ADMIN — METOPROLOL TARTRATE 50 MG: 50 TABLET, FILM COATED ORAL at 22:53

## 2017-01-29 RX ADMIN — MAGNESIUM SULFATE IN WATER 2 G: 40 INJECTION, SOLUTION INTRAVENOUS at 22:42

## 2017-01-29 RX ADMIN — APIXABAN 5 MG: 2.5 TABLET, FILM COATED ORAL at 22:54

## 2017-01-29 RX ADMIN — POTASSIUM CHLORIDE 10 MEQ: 10 INJECTION, SOLUTION INTRAVENOUS at 23:42

## 2017-01-29 RX ADMIN — SIMVASTATIN 40 MG: 20 TABLET, FILM COATED ORAL at 22:53

## 2017-01-29 RX ADMIN — DEXTROSE MONOHYDRATE 500 ML: 50 INJECTION, SOLUTION INTRAVENOUS at 22:42

## 2017-01-29 RX ADMIN — POTASSIUM CHLORIDE 60 MEQ: 1500 TABLET, EXTENDED RELEASE ORAL at 22:52

## 2017-01-29 ASSESSMENT — LIFESTYLE VARIABLES
EVER_SMOKED: YES
ALCOHOL_USE: YES
TOTAL SCORE: 0
CONSUMPTION TOTAL: NEGATIVE
EVER_SMOKED: YES
TOTAL SCORE: 0
HAVE YOU EVER FELT YOU SHOULD CUT DOWN ON YOUR DRINKING: NO
AVERAGE NUMBER OF DAYS PER WEEK YOU HAVE A DRINK CONTAINING ALCOHOL: 0
EVER FELT BAD OR GUILTY ABOUT YOUR DRINKING: NO
EVER HAD A DRINK FIRST THING IN THE MORNING TO STEADY YOUR NERVES TO GET RID OF A HANGOVER: NO
HAVE PEOPLE ANNOYED YOU BY CRITICIZING YOUR DRINKING: NO
TOTAL SCORE: 0
ON A TYPICAL DAY WHEN YOU DRINK ALCOHOL HOW MANY DRINKS DO YOU HAVE: 2
HOW MANY TIMES IN THE PAST YEAR HAVE YOU HAD 5 OR MORE DRINKS IN A DAY: 0

## 2017-01-29 ASSESSMENT — COPD QUESTIONNAIRES
DO YOU EVER COUGH UP ANY MUCUS OR PHLEGM?: NO/ONLY WITH OCCASIONAL COLDS OR INFECTIONS
COPD SCREENING SCORE: 5
HAVE YOU SMOKED AT LEAST 100 CIGARETTES IN YOUR ENTIRE LIFE: YES
DURING THE PAST 4 WEEKS HOW MUCH DID YOU FEEL SHORT OF BREATH: SOME OF THE TIME

## 2017-01-29 ASSESSMENT — PAIN SCALES - GENERAL: PAINLEVEL_OUTOF10: 0

## 2017-01-29 NOTE — IP AVS SNAPSHOT
PopUpsters Access Code: [email or web address]  Expires: 2/9/2017  2:22 PM    PopUpsters  A secure, online tool to manage your health information     [employer]’s PopUpsters® is a secure, online tool that connects you to your personalized health information from the privacy of your home -- day or night - making it very easy for you to manage your healthcare. Once the activation process is completed, you can even access your medical information using the PopUpsters guerrero, which is available for free in the Apple Guerrero store or Google Play store.     PopUpsters provides the following levels of access (as shown below):   My Chart Features   [facility] Primary Care Doctor [facility]  Specialists [facility]  Urgent  Care Non-[facility]  Primary Care  Doctor   Email your healthcare team securely and privately 24/7 X X X X   Manage appointments: schedule your next appointment; view details of past/upcoming appointments X      Request prescription refills. X      View recent personal medical records, including lab and immunizations X X X X   View health record, including health history, allergies, medications X X X X   Read reports about your outpatient visits, procedures, consult and ER notes X X X X   See your discharge summary, which is a recap of your hospital and/or ER visit that includes your diagnosis, lab results, and care plan. X X       How to register for PopUpsters:  1. Go to  [email or web address].  2. Click on the Sign Up Now box, which takes you to the New Member Sign Up page. You will need to provide the following information:  a. Enter your PopUpsters Access Code exactly as it appears at the top of this page. (You will not need to use this code after you’ve completed the sign-up process. If you do not sign up before the expiration date, you must request a new code.)   b. Enter your date of birth.   c. Enter your home email address.   d. Click Submit, and follow the next screen’s instructions.  3. Create a PopUpsters ID. This will be your ClrToucht  login ID and cannot be changed, so think of one that is secure and easy to remember.  4. Create a [employer]ADE password. You can change your password at any time.  5. Enter your Password Reset Question and Answer. This can be used at a later time if you forget your password.   6. Enter your e-mail address. This allows you to receive e-mail notifications when new information is available in NEBOTRADE.  7. Click Sign Up. You can now view your health information.    For assistance activating your NEBOTRADE account, call [phone]

## 2017-01-29 NOTE — IP AVS SNAPSHOT
" <p align=\"LEFT\"><IMG SRC=\"//[email or web address]\" alt=\"Image\" WIDTH=\"50%\" HEIGHT=\"200\" BORDER=\"\"></p>                   Name:Praveen Khalil  Medical Record Number:[id number]  CSN: [id number]    YOB: 1950   Age: 66 y.o.  Sex: male  HT:1.905 m (6' 3\") WT: 165.9 kg (365 lb 11.9 oz)          Admit Date: 1/29/2017     Discharge Date:   Today's Date: 2/1/2017  Attending Doctor:  Boom Houston M.D.                  Allergies:  Nkda          Your appointments     Feb 07, 2017  1:00 PM   ORIENTATION with ICR RN   [facility]    [address]  [address]  [facility] [id number] [phone]            Feb 14, 2017  9:00 AM   TAVR Established Patient with Sidney Laguna M.D.   [facility] (--)    [address]  [place] [id number] [phone]              Follow-up Information     1. Follow up with Taiwo Ureña M.D.. Schedule an appointment as soon as possible for a visit in 1 week.    Specialty:  Cardiology    Contact information    [address]  [address]  [facility] [id number] [phone]           Medication List      Take these Medications        Instructions    allopurinol 300 MG Tabs   Commonly known as:  ZYLOPRIM    Take 300 mg by mouth every morning.   Dose:  300 mg       amiodarone 400 MG tablet   Commonly known as:  PACERONE    One tab by mouth twice daily until Feb 10; on that day change to one pill daily       aspirin EC 81 MG Tbec   Commonly known as:  ECOTRIN    Take 81 mg by mouth every morning.   Dose:  81 mg       BUMEX 1 MG Tabs   Generic drug:  bumetanide    Take 1 mg by mouth 2 times a day.   Dose:  1 mg       CO Q-10 MAXIMUM STRENGTH 400 MG Caps   Generic drug:  Coenzyme Q10    Take 1 Cap by mouth every morning.   Dose:  1 Cap       ELIQUIS 5mg Tabs   Generic drug:  apixaban    Take 5 mg by mouth 2 Times a Day.   Dose:  5 mg       GLUCOSAMINE MSM COMPLEX PO    Take 1 Tab by mouth " every day.   Dose:  1 Tab       levothyroxine 50 MCG Tabs   Commonly known as:  SYNTHROID    Take 50 mcg by mouth Every morning on an empty stomach.   Dose:  50 mcg       LEXAPRO 10 MG Tabs   Generic drug:  escitalopram    Take 10 mg by mouth every day.   Dose:  10 mg       metoprolol 50 MG Tabs   Commonly known as:  LOPRESSOR    Take 50 mg by mouth 3 times a day, with meals.   Dose:  50 mg       MULTIVITAL PO    Take 1 Tab by mouth every day.   Dose:  1 Tab       OSCAL 500/200 D-3 500-200 MG-UNIT Tabs   Generic drug:  calcium carbonate-vitamin D    Take 2 Tabs by mouth every morning with breakfast.   Dose:  2 Tab       potassium chloride SA 20 MEQ Tbcr   Commonly known as:  Kdur    Take 20 mEq by mouth 2 times a day.   Dose:  20 mEq       simvastatin 40 MG Tabs   Commonly known as:  ZOCOR    Take 40 mg by mouth every evening.   Dose:  40 mg       TEKTURNA 300 MG tablet   Generic drug:  aliskiren    Take 300 mg by mouth every day.   Dose:  300 mg       triazolam 0.125 MG tablet   Commonly known as:  HALCION    Take 0.125 mg by mouth at bedtime as needed. Indications: Trouble Sleeping   Dose:  0.125 mg       vitamin D 2000 UNIT Tabs    Take 2,000 Units by mouth every morning.   Dose:  2000 Units

## 2017-01-29 NOTE — IP AVS SNAPSHOT
2/1/2017          Praveen Khalil  [address] [place] [address]    Dear Praveen:    [facility] wants to ensure your discharge home is safe and you or your loved ones have had all your questions answered regarding your care after you leave the hospital.    You may receive a telephone call within two days of your discharge.  This call is to make certain you understand your discharge instructions as well as ensure we provided you with the best care possible during your stay with us.     The call will only last approximately 3-5 minutes and will be done by a nurse.    Once again, we want to ensure your discharge home is safe and that you have a clear understanding of any next steps in your care.  If you have any questions or concerns, please do not hesitate to contact us, we are here for you.  Thank you for choosing [facility] for your healthcare needs.    Sincerely,    Sae Whiteside    [facility]

## 2017-01-29 NOTE — IP AVS SNAPSHOT
" After Visit Summary                                                                                                                  Name:Praveen Khalil  Medical Record Number:[id number]  CSN: [id number]    YOB: 1950   Age: 66 y.o.  Sex: male  HT:1.905 m (6' 3\") WT: 165.9 kg (365 lb 11.9 oz)          Admit Date: 1/29/2017     Discharge Date:   Today's Date: 2/1/2017  Attending Doctor:  Boom Houston M.D.                  Allergies:  Nkda            Discharge Instructions       Discharge Instructions    Discharged to home by car with relative. Discharged via wheelchair, hospital escort: Refused.  Special equipment needed: Not Applicable    Be sure to schedule a follow-up appointment with your primary care doctor or any specialists as instructed.     Discharge Plan:   Diet Plan: Discussed  Activity Level: Discussed  Confirmed Follow up Appointment: Patient to Call and Schedule Appointment  Confirmed Symptoms Management: Discussed  Medication Reconciliation Updated: Yes  Influenza Vaccine Indication: Not indicated: Previously immunized this influenza season and > 8 years of age    I understand that a diet low in cholesterol, fat, and sodium is recommended for good health. Unless I have been given specific instructions below for another diet, I accept this instruction as my diet prescription.   Other diet: Cardiac, low sodium     Special Instructions: None    · Is patient discharged on Warfarin / Coumadin?   No     · Is patient Post Blood Transfusion?  No    Cardioverter Defibrillator Implantation  An implantable cardioverter defibrillator (ICD) is a small, lightweight, battery-powered device that is placed (implanted) under the skin in the chest or abdomen. Your caregiver may prescribe an ICD if:  · You have had an irregular heart rhythm (arrhythmia) that originated in the lower chambers of the heart (ventricles).  · Your heart has been damaged by a disease (such as coronary artery disease) or heart " condition (such as a heart attack).  An ICD consists of a battery that lasts several years, a small computer called a pulse generator, and wires called leads that go into the heart. It is used to detect and correct two dangerous arrhythmias: a rapid heart rhythm (tachycardia) and an arrhythmia in which the ventricles contract in an uncoordinated way (fibrillation). When an ICD detects tachycardia, it sends an electrical signal to the heart that restores the heartbeat to normal (cardioversion). This signal is usually painless. If cardioversion does not work or if the ICD detects fibrillation, it delivers a small electrical shock to the heart (defibrillation) to restart the heart. The shock may feel like a strong jolt in the chest. ICDs may be programmed to correct other problems. Sometimes, ICDs are programmed to act as another type of implantable device called a pacemaker. Pacemakers are used to treat a slow heartbeat (bradycardia).  LET YOUR CAREGIVER KNOW ABOUT:  1. Any allergies you have.  2. All medicines you are taking, including vitamins, herbs, eyedrops, and over-the-counter medicines and creams.  3. Previous problems you or members of your family have had with the use of anesthetics.  4. Any blood disorders you have had.  5. Other health problems you have.  RISKS AND COMPLICATIONS  Generally, the procedure to implant an ICD is safe. However, as with any surgical procedure, complications can occur. Possible complications associated with implanting an ICD include:  · Swelling, bleeding, or bruising at the site where the ICD was implanted.  · Infection at the site where the ICD was implanted.  · A reaction to medicine used during the procedure.  · Nerve, heart, or blood vessel damage.  · Blood clots.  BEFORE THE PROCEDURE  · You may need to have blood tests, heart tests, or a chest X-ray done before the day of the procedure.  · Ask your caregiver about changing or stopping your regular medicines.  · Make plans  to have someone drive you home. You may need to stay in the hospital overnight after the procedure.  · Stop smoking at least 24 hours before the procedure.  · Take a bath or shower the night before the procedure. You may need to scrub your chest or abdomen with a special type of soap.  · Do not eat or drink before your procedure for as long as directed by your caregiver. Ask if it is okay to take any needed medicine with a small sip of water.  PROCEDURE   The procedure to implant an ICD in your chest or abdomen is usually done at a hospital in a room that has a large X-ray machine called a fluoroscope. The machine will be above you during the procedure. It will help your caregiver see your heart during the procedure. Implanting an ICD usually takes 1-3 hours. Before the procedure:   · Small monitors will be put on your body. They will be used to check your heart, blood pressure, and oxygen level.  · A needle will be put into a vein in your hand or arm. This is called an intravenous (IV) access tube. Fluids and medicine will flow directly into your body through the IV tube.  · Your chest or abdomen will be cleaned with a germ-killing (antiseptic) solution. The area may be shaved.  · You may be given medicine to help you relax (sedative).  · You will be given a medicine called a local anesthetic. This medicine will make the surgical site numb while the ICD is implanted. You will be sleepy but awake during the procedure.  After you are numb the procedure will begin. The caregiver will:  · Make a small cut (incision). This will make a pocket deep under your skin that will hold the pulse generator.  · Guide the leads through a large blood vessel into your heart and attach them to the heart muscles. Depending on the ICD, the leads may go into one ventricle or they may go to both ventricles and into an upper chamber of the heart (atrium).  · Test the ICD.  · Close the incision with stitches, glue, or staples.  AFTER THE  PROCEDURE  · You may feel pain. Some pain is normal. It may last a few days.  · You may stay in a recovery area until the local anesthetic has worn off. Your blood pressure and pulse will be checked often. You will be taken to a room where your heart will be monitored.  · A chest X-ray will be taken. This is done to check that the cardioverter defibrillator is in the right place.  · You may stay in the hospital overnight.  · A slight bump may be seen over the skin where the ICD was placed. Sometimes, it is possible to feel the ICD under the skin. This is normal.  · In the months and years afterward, your caregiver will check the device, the leads, and the battery every few months. Eventually, when the battery is low, the ICD will be replaced.     This information is not intended to replace advice given to you by your health care provider. Make sure you discuss any questions you have with your health care provider.     Document Released: 09/09/2003 Document Revised: 10/08/2014 Document Reviewed: 01/06/2014  [employer] Interactive Patient Education ©2016 [employer]      Hand Washing  Germs like bacteria, viruses, and parasites are found everywhere. They can be in the air and water, and they can be on surfaces like food, door handles, and your skin. Every day, your hands touch germs. Many of these germs can make you and your family sick. Washing your hands is one of the best ways to lower your risk of getting and sharing germs.  WHEN SHOULD I WASH MY HANDS OR USE A HAND-WASHING ALCOHOL GEL?  You should wash your hands whenever you think they are dirty. You should also wash your hands:  · Before:  1. Visiting a baby or anyone with a weakened or lowered defense (immune) system.  2. Putting in and taking out any contact lenses.  · After:  1. Working or playing outside.  2. Touching an animal or its toys or leash.  3. Handling livestock, such as cows or sheep.  4. Using the bathroom.  5. Using household  or  poisonous chemicals.  6. Touching or taking out the garbage.  7. Touching anything dirty around your home.  8. Handling dirty clothes or rags.  9. Taking care of a sick child. This includes touching used tissues, toys, and clothes.  10. Sneezing, coughing, or blowing your nose.  11. Using public transportation.  12. Shaking hands.  13. Using a phone, including your mobile phone.  14. Touching money.  · Before and after:  1. Preparing food.  2. Preparing a bottle for a baby.  3. Feeding a baby or young child.  4. Eating.  5. Visiting or taking care of someone who is sick.  6. Changing a diaper.  7. Changing a bandage (dressing).  8. Taking care of an injury or wound.  9. Giving or taking medicine.  If you are preparing food, hand-washing alcohol gels are not recommended as a replacement for hand washing.  WHAT IS THE RIGHT WAY TO WASH MY HANDS?  6. Wet your hands with clean, running water.  7. Apply liquid soap or bar soap to your hands.  8. Rub your hands together quickly to create lather.  9. Keep rubbing your hands together for at least 20 seconds. Thoroughly scrub all parts of your hands, including under your fingernails and between your fingers.  10. Rinse your hands with clean, running water. Do this until all the soap is gone.  11. Dry your hands using an air dryer or a clean paper or cloth towel, or let your hands air-dry. Do not use your clothing or a dirty towel to dry your hands.  12. If you are in a public restroom, use your towel:  1. To turn off the water faucet.  2. To open the bathroom door.     This information is not intended to replace advice given to you by your health care provider. Make sure you discuss any questions you have with your health care provider.     Document Released: 11/30/2009 Document Revised: 01/08/2016 Document Reviewed: 05/15/2015  [employer] Interactive Patient Education ©2016 [employer]    Infection Control in the Home  If you have an infection or you are taking care of  someone who has an infection, it is important to know how to keep the infection from spreading. Follow these guidelines to help stop the spread of infection, and talk to your health care provider.  HOW ARE INFECTIONS SPREAD?  In order for an infection to spread, the following must be present:  · A germ. This may be a virus, bacteria, fungus, or parasite.  · A place for the germ to live. This may be:  1. On or in a person, animal, plant, or food.  2. In soil or water.  3. On surfaces, such as a door handle.  · A susceptible host. This is a person or animal who does not have resistance (immunity) to the germ.  · A way for the germ to enter the host. This may occur by:  1. Direct contact. This may happen by making contact--such as shaking hands or hugging--with an infected person or animal. Some germs can also travel through the air and spread to you if an infected person coughs or sneezes on you or near you.  2. Indirect contact. This is when the germ enters the host through contact with an infected object. Examples include eating contaminated food, drinking contaminated water, or touching a contaminated surface with your hands and then touching your face, nose, or mouth soon after that.  HOW CAN I HELP TO PREVENT INFECTION FROM SPREADING?  There are several things that you can do to help prevent infection from spreading.  Hand Washing  It is very important to wash your hands correctly, following these steps:  13. Wet your hands with clean, running water.  14. Apply soap to your hands. Liquid soap is better than bar soap.  15. Rub your hands together quickly to create lather.  16. Keep rubbing your hands together for at least 20 seconds. Thoroughly scrub all parts of your hands, including under your fingernails and between your fingers.  17. Rinse your hands with clean, running water until all of the soap is gone.  18. Dry your hands with an air dryer or a clean paper or cloth towel, or let your hands air-dry. Do not  use your clothing or a soiled towel to dry your hands.  19. If you are in a public restroom, use your towel to turn off the water faucet and to open the bathroom door.  Make sure to wash your hands:  · Before:  ¨ Visiting a baby or anyone with a weakened or lowered defense (immune) system.  ¨ Putting in and taking out any contact lenses.  · After:  ¨ Working or playing outside.  ¨ Touching an animal or its toys or leash.  ¨ Handling livestock.  ¨ Using the bathroom or helping a child or adult to use the bathroom.  ¨ Using household  or toxic chemicals.  ¨ Touching or taking out the garbage.  ¨ Touching anything dirty around your home.  ¨ Handling soiled clothes or rags.  ¨ Taking care of a sick child. This includes touching used tissues, toys, and clothes.  ¨ Sneezing, coughing, or blowing your nose.  ¨ Using public transportation.  ¨ Shaking hands.  ¨ Using a phone, including your mobile phone.  ¨ Touching money.  · Before and after:  ¨ Preparing food.  ¨ Preparing a bottle for a baby.  ¨ Feeding a baby or a young child.  ¨ Eating.  ¨ Visiting or taking care of someone who is sick.  ¨ Changing a diaper.  ¨ Changing a bandage (dressing) or taking care of an injury or wound.  ¨ Giving or taking medicine.  Taking Care of Your Home  · Make sure that you have enough cleaning supplies at all times. These include:  ¨ Disinfectants.  ¨ Reusable cleaning cloths. Wash these after each use.  ¨ Paper towels.  ¨ Utility gloves. Replace your gloves if they are cracked or torn or if they start to peel.  · Use bleach safely. Never mix it with other cleaning products, especially those that contain ammonia. This mixture can create a dangerous gas that may be deadly.  · Take care of your cleaning supplies. Toilet brushes, mops, and sponges can breed germs. Soak them in bleach and water for 5 minutes after each use.  · Do not pour used mop water down the sink. Pour it down the toilet instead.  · Maintain proper ventilation in  your home.  · If you have a pet, ensure that your pet stays clean. Do not let people with weak immune systems touch bird droppings, fish tank water, or a litter box.  ¨ If you have a cat, be sure to change the litter every day.  · In the bathroom, make sure you:  ¨ Provide liquid soap.  ¨ Change towels and washcloths frequently. Avoid sharing towels and washcloths.  ¨ Change toothbrushes often and store them separately in a clean, dry place.  ¨ Disinfect the toilet.  ¨ Clean the tub, shower, and sink with standard cleaning products.    ¨ Mop the floor with a standard .  ¨ Do not share personal items, such as razors, toothbrushes, drinking glasses, deodorant, zhu, brushes, towels, and washcloths.    · In the kitchen, make sure you:  ¨ Store food carefully.  ¨ Refrigerate leftovers promptly in covered containers.  ¨ Throw out stale or spoiled food.  ¨ Clean the inside of your refrigerator each week.  ¨ Keep your refrigerator set at 40°F (4°C) or less, and set your freezer at 0°F (-18°C) or less.  ¨ Thaw foods in the refrigerator or microwave, not at room temperature.  ¨ Serve foods at the proper temperature. Do not eat raw meat. Make sure it is cooked to the appropriate temperature. Cook eggs until they are firm.  ¨ Wash fruits and vegetables under running water.  ¨ Use separate cutting boards, plates, and utensils for raw foods and cooked foods.  ¨ Keep work surfaces clean.  ¨ Use a clean spoon each time you sample food while cooking.  ¨ Wash your dishes in hot, soapy water. Air-dry your dishes or use a .  ¨ Do not share forks, cups, or spoons during meals.  · Wear gloves if laundry is visibly soiled.  · Change linens each week or whenever they are soiled.  · Do not shake soiled linens. Doing that may send germs into the air. Put dressings, sanitary or incontinence pads, diapers, and gloves in plastic garbage bags for disposal.     This information is not intended to replace advice given to you by  your health care provider. Make sure you discuss any questions you have with your health care provider.     Document Released: 09/26/2009 Document Revised: 01/08/2016 Document Reviewed: 08/20/2015  [employer] Interactive Patient Education ©2016 [employer]    Cardiocerebral Resuscitation   Cardiocerebral Resuscitation is thought to be better than CPR for out-of-hospital cardiac arrest.  BYSTANDER CARDIOCEREBRAL RESUSCITATION INVOLVES THREE SIMPLE STEPS:  · Direct someone to call your local medical emergency service or make the call yourself.   · Position the patient on the floor. Place the heel of one hand on the center of the chest. Place the other hand on top of the first. Lock your elbows. Perform forceful chest compressions at a rate of 100 per minute. Lift your hands slightly after each push. This will allow the chest to recoil. Take turns with a bystander until paramedics arrive.   · If an automated external defibrillator (AED) is available, attach it to the patient. Then follow the machine's voice instructions. Otherwise, keep pumping.   NOTE: Gasping is not an indication of normal breathing or recovery. Initiate and continue compressions even if patient gasps. For cases of suspected drowning, drug overdose or collapse in children, follow guideline CPR (2 mouth-to-mouth breaths for every 30 chest compressions).  Document Released: 11/17/2006 Document Revised: 03/11/2013 Document Reviewed: 08/05/2009  [employer]® Patient Information ©2013 [employer].    Depression / Suicide Risk    As you are discharged from this [facility] facility, it is important to learn how to keep safe from harming yourself.    Recognize the warning signs:  · Abrupt changes in personality, positive or negative- including increase in energy   · Giving away possessions  · Change in eating patterns- significant weight changes-  positive or negative  · Change in sleeping patterns- unable to sleep or sleeping all the  time   · Unwillingness or inability to communicate  · Depression  · Unusual sadness, discouragement and loneliness  · Talk of wanting to die  · Neglect of personal appearance   · Rebelliousness- reckless behavior  · Withdrawal from people/activities they love  · Confusion- inability to concentrate     If you or a loved one observes any of these behaviors or has concerns about self-harm, here's what you can do:  · Talk about it- your feelings and reasons for harming yourself  · Remove any means that you might use to hurt yourself (examples: pills, rope, extension cords, firearm)  · Get professional help from the community (Mental Health, Substance Abuse, psychological counseling)  · Do not be alone:Call your Safe Contact- someone whom you trust who will be there for you.  · Call your local CRISIS HOTLINE 006-6049 or 802-775-0059  · Call your local Children's Mobile Crisis Response Team [place] (448) 387-5681 or www.Landingi  · Call the toll free National Suicide Prevention Hotlines   · National Suicide Prevention Lifeline 986-630-FZLQ (6635)  · [employer] Hope Line Network 800-SUICIDE (088-2474)        Your appointments     Feb 07, 2017  1:00 PM   ORIENTATION with ICR RN   [facility] ([facility])    [address]  [address]  [facility] [address] [phone]            Feb 14, 2017  9:00 AM   TAVR Established Patient with Sidney Laguna M.D.   [facility] (--)    [address]  [place] [address] [phone]              Follow-up Information     1. Follow up with Taiwo Ureña M.D.. Schedule an appointment as soon as possible for a visit in 1 week.    Specialty:  Cardiology    Contact information    [address]  [address]  [facility] [address] [phone]           Discharge Medication Instructions:    Below are the medications your physician expects you to take upon discharge:    Review all your home medications  and newly ordered medications with your doctor and/or pharmacist. Follow medication instructions as directed by your doctor and/or pharmacist.    Please keep your medication list with you and share with your physician.               Medication List      START taking these medications        Instructions    amiodarone 400 MG tablet   Last time this was given:  400 mg on 2/1/2017  8:22 AM   Commonly known as:  PACERONE    One tab by mouth twice daily until Feb 10; on that day change to one pill daily         CONTINUE taking these medications        Instructions    allopurinol 300 MG Tabs   Last time this was given:  300 mg on 2/1/2017  8:22 AM   Commonly known as:  ZYLOPRIM    Take 300 mg by mouth every morning.   Dose:  300 mg       aspirin EC 81 MG Tbec   Last time this was given:  81 mg on 2/1/2017  8:22 AM   Commonly known as:  ECOTRIN    Take 81 mg by mouth every morning.   Dose:  81 mg       BUMEX 1 MG Tabs   Last time this was given:  1 mg on 2/1/2017  6:28 AM   Generic drug:  bumetanide    Take 1 mg by mouth 2 times a day.   Dose:  1 mg       CO Q-10 MAXIMUM STRENGTH 400 MG Caps   Last time this was given:  390 mg on 2/1/2017  8:23 AM   Generic drug:  Coenzyme Q10    Take 1 Cap by mouth every morning.   Dose:  1 Cap       ELIQUIS 5mg Tabs   Last time this was given:  5 mg on 2/1/2017  8:22 AM   Generic drug:  apixaban    Take 5 mg by mouth 2 Times a Day.   Dose:  5 mg       GLUCOSAMINE MSM COMPLEX PO    Take 1 Tab by mouth every day.   Dose:  1 Tab       levothyroxine 50 MCG Tabs   Last time this was given:  50 mcg on 2/1/2017  6:27 AM   Commonly known as:  SYNTHROID    Take 50 mcg by mouth Every morning on an empty stomach.   Dose:  50 mcg       LEXAPRO 10 MG Tabs   Last time this was given:  10 mg on 2/1/2017  8:23 AM   Generic drug:  escitalopram    Take 10 mg by mouth every day.   Dose:  10 mg       metoprolol 50 MG Tabs   Last time this was given:  50 mg on 2/1/2017  6:27 AM   Commonly known as:   LOPRESSOR    Take 50 mg by mouth 3 times a day, with meals.   Dose:  50 mg       MULTIVITAL PO   Last time this was given:  1 Tab on 2/1/2017  8:23 AM    Take 1 Tab by mouth every day.   Dose:  1 Tab       OSCAL 500/200 D-3 500-200 MG-UNIT Tabs   Generic drug:  calcium carbonate-vitamin D    Take 2 Tabs by mouth every morning with breakfast.   Dose:  2 Tab       potassium chloride SA 20 MEQ Tbcr   Last time this was given:  40 mEq on 2/1/2017  8:23 AM   Commonly known as:  Kdur    Take 20 mEq by mouth 2 times a day.   Dose:  20 mEq       simvastatin 40 MG Tabs   Last time this was given:  40 mg on 1/31/2017  7:31 PM   Commonly known as:  ZOCOR    Take 40 mg by mouth every evening.   Dose:  40 mg       TEKTURNA 300 MG tablet   Last time this was given:  300 mg on 1/31/2017  5:59 PM   Generic drug:  aliskiren    Take 300 mg by mouth every day.   Dose:  300 mg       triazolam 0.125 MG tablet   Last time this was given:  0.125 mg on 1/31/2017  9:51 PM   Commonly known as:  HALCION    Take 0.125 mg by mouth at bedtime as needed. Indications: Trouble Sleeping   Dose:  0.125 mg       vitamin D 2000 UNIT Tabs   Last time this was given:  2,000 Units on 2/1/2017  8:23 AM    Take 2,000 Units by mouth every morning.   Dose:  2000 Units         STOP taking these medications     digoxin 250 MCG Tabs   Commonly known as:  LANOXIN       diltiazem 240 MG XR capsule   Commonly known as:  DILACOR XR               Instructions           Diet / Nutrition:    Follow any diet instructions given to you by your doctor or the dietician, including how much salt (sodium) you are allowed each day.    If you are overweight, talk to your doctor about a weight reduction plan.    Activity:    Remain physically active following your doctor's instructions about exercise and activity.    Rest often.     Any time you become even a little tired or short of breath, SIT DOWN and rest.    Worsening Symptoms:    Report any of the following signs and  symptoms to the doctor's office immediately:    *Pain of jaw, arm, or neck  *Chest pain not relieved by medication                               *Dizziness or loss of consciousness  *Difficulty breathing even when at rest   *More tired than usual                                       *Bleeding drainage or swelling of surgical site  *Swelling of feet, ankles, legs or stomach                 *Fever (>100ºF)  *Pink or blood tinged sputum  *Weight gain (3lbs/day or 5lbs /week)           *Shock from internal defibrillator (if applicable)  *Palpitations or irregular heartbeats                *Cool and/or numb extremities    Stroke Awareness    Common Risk Factors for Stroke include:    Age  Atrial Fibrillation  Carotid Artery Stenosis  Diabetes Mellitus  Excessive alcohol consumption  High blood pressure  Overweight   Physical inactivity  Smoking    Warning signs and symptoms of a stroke include:    *Sudden numbness or weakness of the face, arm or leg (especially on one side of the body).  *Sudden confusion, trouble speaking or understanding.  *Sudden trouble seeing in one or both eyes.  *Sudden trouble walking, dizziness, loss of balance or coordination.Sudden severe headache with no known cause.    It is very important to get treatment quickly when a stroke occurs. If you experience any of the above warning signs, call 911 immediately.                   Disclaimer         Quit Smoking / Tobacco Use:    I understand the use of any tobacco products increases my chance of suffering from future heart disease or stroke and could cause other illnesses which may shorten my life. Quitting the use of tobacco products is the single most important thing I can do to improve my health. For further information on smoking / tobacco cessation call a Toll Free Quit Line at 1-836.906.5278 (*National [employer]ath) or 1-574.110.6356 (American Lung Association) or you can access the web based program at www.lungusa.org.    [place]  Tobacco Users Help Line:  (943) 923-9241       Toll Free: 5-566-431-7914  Quit Tobacco Program [facility] (443)[phone]    Crisis Hotline:    [place] Crisis Hotline:  6-136-TVSNDKX or 1-973.180.3717    [place] Crisis Hotline:    1-937.317.6952 or 438-210-0685    Discharge Survey:   Thank you for choosing [facility]. We hope we did everything we could to make your hospital stay a pleasant one. You may be receiving a phone survey and we would appreciate your time and participation in answering the questions. Your input is very valuable to us in our efforts to improve our service to our patients and their families.        My signature on this form indicates that:    1. I have reviewed and understand the above information.  2. My questions regarding this information have been answered to my satisfaction.  3. I have formulated a plan with my discharge nurse to obtain my prescribed medications for home.                  Disclaimer         __________________________________                     __________       ________                       Patient Signature                                                 Date                    Time

## 2017-01-30 PROBLEM — E87.6 HYPOKALEMIA: Status: ACTIVE | Noted: 2017-01-30

## 2017-01-30 PROBLEM — F32.A DEPRESSION: Status: ACTIVE | Noted: 2017-01-30

## 2017-01-30 PROBLEM — R55 SYNCOPE: Status: ACTIVE | Noted: 2017-01-30

## 2017-01-30 PROBLEM — D68.32 HEMORRHAGIC DISORDER DUE TO EXTRINSIC CIRCULATING ANTICOAGULANTS (HCC): Status: ACTIVE | Noted: 2017-01-30

## 2017-01-30 LAB
ALBUMIN SERPL BCP-MCNC: 3.9 G/DL (ref 3.2–4.9)
ALBUMIN/GLOB SERPL: 1.3 G/DL
ALP SERPL-CCNC: 57 U/L (ref 30–99)
ALT SERPL-CCNC: 46 U/L (ref 2–50)
ANION GAP SERPL CALC-SCNC: 8 MMOL/L (ref 0–11.9)
APTT PPP: 28.7 SEC (ref 24.7–36)
APTT PPP: 38.2 SEC (ref 24.7–36)
AST SERPL-CCNC: 38 U/L (ref 12–45)
BASOPHILS # BLD AUTO: 0.5 % (ref 0–1.8)
BASOPHILS # BLD: 0.05 K/UL (ref 0–0.12)
BILIRUB SERPL-MCNC: 0.8 MG/DL (ref 0.1–1.5)
BUN SERPL-MCNC: 23 MG/DL (ref 8–22)
CALCIUM SERPL-MCNC: 9.9 MG/DL (ref 8.5–10.5)
CHLORIDE SERPL-SCNC: 106 MMOL/L (ref 96–112)
CO2 SERPL-SCNC: 25 MMOL/L (ref 20–33)
CREAT SERPL-MCNC: 1.01 MG/DL (ref 0.5–1.4)
EKG IMPRESSION: NORMAL
EOSINOPHIL # BLD AUTO: 0.23 K/UL (ref 0–0.51)
EOSINOPHIL NFR BLD: 2.3 % (ref 0–6.9)
ERYTHROCYTE [DISTWIDTH] IN BLOOD BY AUTOMATED COUNT: 55.2 FL (ref 35.9–50)
GFR SERPL CREATININE-BSD FRML MDRD: >60 ML/MIN/1.73 M 2
GLOBULIN SER CALC-MCNC: 2.9 G/DL (ref 1.9–3.5)
GLUCOSE SERPL-MCNC: 182 MG/DL (ref 65–99)
HCT VFR BLD AUTO: 35.8 % (ref 42–52)
HGB BLD-MCNC: 11.5 G/DL (ref 14–18)
IMM GRANULOCYTES # BLD AUTO: 0.07 K/UL (ref 0–0.11)
IMM GRANULOCYTES NFR BLD AUTO: 0.7 % (ref 0–0.9)
LYMPHOCYTES # BLD AUTO: 1.42 K/UL (ref 1–4.8)
LYMPHOCYTES NFR BLD: 14.1 % (ref 22–41)
MAGNESIUM SERPL-MCNC: 2.3 MG/DL (ref 1.5–2.5)
MCH RBC QN AUTO: 28.7 PG (ref 27–33)
MCHC RBC AUTO-ENTMCNC: 32.1 G/DL (ref 33.7–35.3)
MCV RBC AUTO: 89.3 FL (ref 81.4–97.8)
MONOCYTES # BLD AUTO: 1.19 K/UL (ref 0–0.85)
MONOCYTES NFR BLD AUTO: 11.8 % (ref 0–13.4)
NEUTROPHILS # BLD AUTO: 7.11 K/UL (ref 1.82–7.42)
NEUTROPHILS NFR BLD: 70.6 % (ref 44–72)
NRBC # BLD AUTO: 0 K/UL
NRBC BLD AUTO-RTO: 0 /100 WBC
PLATELET # BLD AUTO: 102 K/UL (ref 164–446)
PMV BLD AUTO: 11.6 FL (ref 9–12.9)
POTASSIUM SERPL-SCNC: 4 MMOL/L (ref 3.6–5.5)
PROT SERPL-MCNC: 6.8 G/DL (ref 6–8.2)
RBC # BLD AUTO: 4.01 M/UL (ref 4.7–6.1)
SODIUM SERPL-SCNC: 139 MMOL/L (ref 135–145)
TROPONIN I SERPL-MCNC: 0.07 NG/ML (ref 0–0.04)
TROPONIN I SERPL-MCNC: 0.35 NG/ML (ref 0–0.04)
TROPONIN I SERPL-MCNC: 0.48 NG/ML (ref 0–0.04)
WBC # BLD AUTO: 10.1 K/UL (ref 4.8–10.8)

## 2017-01-30 PROCEDURE — 700111 HCHG RX REV CODE 636 W/ 250 OVERRIDE (IP)

## 2017-01-30 PROCEDURE — 83735 ASSAY OF MAGNESIUM: CPT

## 2017-01-30 PROCEDURE — 93010 ELECTROCARDIOGRAM REPORT: CPT | Mod: 76 | Performed by: INTERNAL MEDICINE

## 2017-01-30 PROCEDURE — 84484 ASSAY OF TROPONIN QUANT: CPT | Mod: 91

## 2017-01-30 PROCEDURE — 85730 THROMBOPLASTIN TIME PARTIAL: CPT

## 2017-01-30 PROCEDURE — A9270 NON-COVERED ITEM OR SERVICE: HCPCS | Performed by: INTERNAL MEDICINE

## 2017-01-30 PROCEDURE — 94660 CPAP INITIATION&MGMT: CPT

## 2017-01-30 PROCEDURE — 80053 COMPREHEN METABOLIC PANEL: CPT

## 2017-01-30 PROCEDURE — 93005 ELECTROCARDIOGRAM TRACING: CPT | Performed by: INTERNAL MEDICINE

## 2017-01-30 PROCEDURE — 700102 HCHG RX REV CODE 250 W/ 637 OVERRIDE(OP): Performed by: INTERNAL MEDICINE

## 2017-01-30 PROCEDURE — 770022 HCHG ROOM/CARE - ICU (200)

## 2017-01-30 PROCEDURE — 700111 HCHG RX REV CODE 636 W/ 250 OVERRIDE (IP): Performed by: INTERNAL MEDICINE

## 2017-01-30 PROCEDURE — 99233 SBSQ HOSP IP/OBS HIGH 50: CPT | Performed by: HOSPITALIST

## 2017-01-30 PROCEDURE — 85025 COMPLETE CBC W/AUTO DIFF WBC: CPT

## 2017-01-30 RX ADMIN — DILTIAZEM HYDROCHLORIDE 240 MG: 240 CAPSULE, COATED, EXTENDED RELEASE ORAL at 09:03

## 2017-01-30 RX ADMIN — Medication 390 MG: at 09:03

## 2017-01-30 RX ADMIN — TRIAZOLAM 0.12 MG: 0.25 TABLET ORAL at 21:14

## 2017-01-30 RX ADMIN — CHOLECALCIFEROL TAB 10 MCG (400 UNIT) 400 UNITS: 10 TAB at 09:11

## 2017-01-30 RX ADMIN — ESCITALOPRAM OXALATE 10 MG: 10 TABLET ORAL at 09:03

## 2017-01-30 RX ADMIN — HEPARIN SODIUM 1700 UNITS/HR: 5000 INJECTION, SOLUTION INTRAVENOUS at 15:00

## 2017-01-30 RX ADMIN — METOPROLOL TARTRATE 50 MG: 50 TABLET, FILM COATED ORAL at 06:29

## 2017-01-30 RX ADMIN — VITAMIN D, TAB 1000IU (100/BT) 2000 UNITS: 25 TAB at 09:04

## 2017-01-30 RX ADMIN — METOPROLOL TARTRATE 50 MG: 50 TABLET, FILM COATED ORAL at 11:58

## 2017-01-30 RX ADMIN — SIMVASTATIN 40 MG: 20 TABLET, FILM COATED ORAL at 20:51

## 2017-01-30 RX ADMIN — ASPIRIN 81 MG: 81 TABLET ORAL at 09:03

## 2017-01-30 RX ADMIN — BUMETANIDE 1 MG: 1 TABLET ORAL at 15:04

## 2017-01-30 RX ADMIN — POTASSIUM CHLORIDE 20 MEQ: 1500 TABLET, EXTENDED RELEASE ORAL at 09:02

## 2017-01-30 RX ADMIN — AMIODARONE HYDROCHLORIDE 0.5 MG/MIN: 50 INJECTION, SOLUTION INTRAVENOUS at 06:36

## 2017-01-30 RX ADMIN — DULOXETINE HYDROCHLORIDE 250 MCG: 30 CAPSULE, DELAYED RELEASE ORAL at 09:02

## 2017-01-30 RX ADMIN — MULTIPLE VITAMINS W/ MINERALS TAB 1 TABLET: TAB at 09:04

## 2017-01-30 RX ADMIN — AMIODARONE HYDROCHLORIDE 150 MG: 50 INJECTION, SOLUTION INTRAVENOUS at 00:16

## 2017-01-30 RX ADMIN — METOPROLOL TARTRATE 50 MG: 50 TABLET, FILM COATED ORAL at 17:38

## 2017-01-30 RX ADMIN — CALCIUM CARBONATE 1000 MG: 1250 TABLET ORAL at 09:11

## 2017-01-30 RX ADMIN — TRIAZOLAM 0.12 MG: 0.25 TABLET ORAL at 00:18

## 2017-01-30 RX ADMIN — LEVOTHYROXINE SODIUM 50 MCG: 50 TABLET ORAL at 06:29

## 2017-01-30 RX ADMIN — AMIODARONE HYDROCHLORIDE 0.5 MG/MIN: 50 INJECTION, SOLUTION INTRAVENOUS at 20:30

## 2017-01-30 RX ADMIN — AMIODARONE HYDROCHLORIDE 1 MG/MIN: 50 INJECTION, SOLUTION INTRAVENOUS at 00:17

## 2017-01-30 RX ADMIN — ALISKIREN HEMIFUMARATE 300 MG: 150 TABLET, FILM COATED ORAL at 00:18

## 2017-01-30 RX ADMIN — POTASSIUM CHLORIDE 20 MEQ: 1500 TABLET, EXTENDED RELEASE ORAL at 20:51

## 2017-01-30 RX ADMIN — BUMETANIDE 1 MG: 1 TABLET ORAL at 06:29

## 2017-01-30 RX ADMIN — DEXTROSE MONOHYDRATE 500 ML: 50 INJECTION, SOLUTION INTRAVENOUS at 16:30

## 2017-01-30 RX ADMIN — POTASSIUM CHLORIDE 10 MEQ: 10 INJECTION, SOLUTION INTRAVENOUS at 00:51

## 2017-01-30 RX ADMIN — ALISKIREN HEMIFUMARATE 300 MG: 150 TABLET, FILM COATED ORAL at 17:38

## 2017-01-30 RX ADMIN — ALLOPURINOL 300 MG: 300 TABLET ORAL at 09:02

## 2017-01-30 ASSESSMENT — PAIN SCALES - GENERAL
PAINLEVEL_OUTOF10: 0

## 2017-01-30 ASSESSMENT — ENCOUNTER SYMPTOMS
ABDOMINAL PAIN: 0
VOMITING: 0
PALPITATIONS: 0
SHORTNESS OF BREATH: 0
INSOMNIA: 0
TINGLING: 0
NECK PAIN: 0
DEPRESSION: 0
COUGH: 0
SORE THROAT: 0
BLURRED VISION: 0
EYE PAIN: 0
CHILLS: 0
HEADACHES: 0
NAUSEA: 0
DIZZINESS: 0
FEVER: 0
BACK PAIN: 0

## 2017-01-30 NOTE — CONSULTS
[id number]    Sustained VT  Syncope likely from VT    Replete K  Continue home meds  Agree with amiodarone gtts  Hold eliquis in the AM for possible ICD    It is my pleasure to participate in the care of Mr. Khalil.  Please do not hesitate to contact me with questions or concerns.    Byron Hood MD PhD FACC  Cardiologist [facility]

## 2017-01-30 NOTE — ED NOTES
Attempted to call report to admitting RN, unavailable at this time, RN will return call with questions.

## 2017-01-30 NOTE — PROGRESS NOTES
Pt care assumed. Pt resting in bed, no c/o pain, VSS. Assessment completed and medications given. Plan of care reviewed with pt. Call light with in reach, bed in lowest position, fall precautions in place. Will continue to monitor.

## 2017-01-30 NOTE — ED NOTES
Pt updateand educated on NPO, given mouth swab.  Urine collected, sent to lab.  Family at bedside.

## 2017-01-30 NOTE — CARE PLAN
Problem: Knowledge Deficit  Goal: Maintain or improve baseline circulation, motion and sensation  Outcome: PROGRESSING AS EXPECTED  Pt educated on disease process, medications, and plan of care.All questions answered.     Problem: Hemodynamic Status  Goal: Vital Signs and Fluid Balance Management  Outcome: PROGRESSING AS EXPECTED  Cardiac monitoring in place, pt monitored for arrhythmias. Antiarrhythmics administered per MD order.

## 2017-01-30 NOTE — DISCHARGE PLANNING
67 y/o Admit overnight.  EMS called in VTACH.  TAVR 1/9/17.   A/O x4.  No pain.  Afib 's.  No fevers.  Cardiac diet.  Good urine out put.  Up to bathroom.  Steady on feet.   Amio drip.      AICD in AM.  2 L 02.  No ABX.    Will follow.  [place] resident.  Insurance and PCP.

## 2017-01-30 NOTE — PROGRESS NOTES
Pt up to unit via gurleonel with 2 RNs. Family at bedside. All belongings with patient. Pt oriented to the unit.

## 2017-01-30 NOTE — CONSULTS
"Pt seen and examined.  Presented with syncope and found in vt.  rosc without direct current.  Started on amio.  Been back on oac since  tavr 1/9- 3 weeks ago today.    rrr on exam now.  Do not her murmur on exam.  Cath before tavr 10/26/16 \"angiographically unremarkable coronaries\"  Strips show wct regular abberrant highly suggestive of vt  A fib since august.   Recommend dccv and dual chamber icd.    Full dictated consult to follow  "

## 2017-01-30 NOTE — ED NOTES
Pt brought in by EMS.     Chief Complaint   Patient presents with   • ALOC     pt was at home felt dizzy and had tunnel vision, and had ALOC. Wife woke pt and called EMS       Pt had aortic valve (TAVR)  placed by Dr Laguna.     Pt on monitor, in gown, chart up for ERP.

## 2017-01-30 NOTE — DIETARY
NUTRITION SERVICES: BMI - Pt with BMI >40 (=41.8). Weight loss counseling not appropriate in acute care setting. RECOMMEND - Referral to outpatient nutrition services for weight management after D/C.

## 2017-01-30 NOTE — CONSULTS
Cardiology Consult Note:    Amr M Mohsen  Date & Time note created:    1/30/2017   11:54 AM       Patient ID:   Name:             rPaveen Khalil   YOB: 1950  Age:                 66 y.o.  male   MRN:               [id number]                                                             Reason for Consult:      Sustained Vtach    History of Present Illness:    Bert is a very pleasant 66 year old man, patient of Dr. Ureña With hypertension, hyperlipidemia, COPD, morbid obesity, obstructive sleep apnea on BiPAP, history of DVT and PE, A. fib on chronic anticoagulation with Eliquis, hypo-thyroidism, severe aortic stenosis with TAVR approximately 3 weeks ago who presented to the emergency department with an episode of syncope due to sustained Vtach.  He had a short prodrome preceding the syncope, and after he regained consciousness he was found to have a bowel movement in the form of diarrhea and his wife also noted seeing some muscle twitches  He was started on amiodarone drip    Cardiovascular review of systems is negative for for chest pain,dyspnea, PND, orthopnea, palpitations, TIA (amaurosis fugax, focal motor or sensory loss, speech or communication problems), stroke, claudications, foot ulcers     Review of Systems:      Constitutional: Denies fevers, Denies weight changes  Eyes: Denies changes in vision, no eye pain  Ears/Nose/Throat/Mouth: Denies nasal congestion or sore throat   Cardiovascular:  chest pain,  palpitations   Respiratory:  shortness of breath , Denies cough  Gastrointestinal/Hepatic: Denies abdominal pain, nausea, vomiting, diarrhea, constipation or GI bleeding   Genitourinary: Denies dysuria or frequency  Musculoskeletal/Rheum: Denies  joint pain and swelling,   Skin: Denies rash  Neurological: Denies headache, confusion, memory loss or focal weakness/parasthesias  Psychiatric: denies mood disorder   Endocrine: Ella thyroid problems  Heme/Oncology/Lymph Nodes: Denies  "enlarged lymph nodes, denies brusing or known bleeding disorder  All other systems were reviewed and are negative (AMA/CMS criteria)                Past Medical History:   Past Medical History   Diagnosis Date   • Pulmonary embolism (CMS-[facility])    • Hypertension    • Kidney stone    • Aortic stenosis      TAVR 1/9/17   • Anesthesia      \"Apnea at times with deep sedation\"    • Arthritis      osteo- hands   • Congestive heart failure (CMS-[facility])    • Sleep apnea      BiPAP with oxygen 2.5 L   • Cataract      bilat IOL    • Psychiatric problem      depression, anxiety    • Gout    • Atrial fibrillation (CMS-[facility])      A Fib    • Disorder of thyroid      Active Hospital Problems    Diagnosis   • Syncope [R55]   • Hypokalemia [E87.6]   • Depression [F32.9]   • Hemorrhagic disorder due to extrinsic circulating anticoagulants (CMS-[facility]) [D68.32]   • Ventricular tachycardia (CMS-[facility]) [I47.2]       Past Surgical History:  Past Surgical History   Procedure Laterality Date   • Lumbar laminectomy diskectomy  2007   • Tonsillectomy     • Hip arthroplasty total  10/22/2013     Performed by Nader Barr M.D. at [facility]   • Cataract extraction with iol     • Dupuytren contracture release Right 2014   • Transcatheter aortic valve replacement  1/9/2017     Procedure: TRANSCATHETER AORTIC VALVE REPLACEMENT WITH SCOUT;  Surgeon: Sidney Laguna M.D.;  Location: [facility];  Service:        Hospital Medications:    Current facility-administered medications:   •  triazolam (HALCION) tablet 0.125 mg, 0.125 mg, Oral, HS PRN, Boom Houston M.D., 0.125 mg at 01/30/17 0018  •  simvastatin (ZOCOR) tablet 40 mg, 40 mg, Oral, Nightly, Boom Houston M.D., 40 mg at 01/29/17 2253  •  potassium chloride SA (Kdur) tablet 20 mEq, 20 mEq, Oral, BID, Boom Houston M.D., 20 mEq at 01/30/17 0902  •  therapeutic multivitamin-minerals (THERAGRAN-M) tablet 1 Tab, 1 Tab, Oral, DAILY, Boom Houston M.D., 1 Tab at 01/30/17 0904  •  metoprolol " (LOPRESSOR) tablet 50 mg, 50 mg, Oral, TID WITH MEALS, Boom Houston M.D., 50 mg at 01/30/17 0629  •  levothyroxine (SYNTHROID) tablet 50 mcg, 50 mcg, Oral, AM ES, Boom Houston M.D., 50 mcg at 01/30/17 0629  •  escitalopram (LEXAPRO) tablet 10 mg, 10 mg, Oral, DAILY, Boom Houston M.D., 10 mg at 01/30/17 0903  •  diltiazem CD (CARDIZEM CD) capsule 240 mg, 240 mg, Oral, QACARROLL, Boom Houston M.D., 240 mg at 01/30/17 0903  •  digoxin (LANOXIN) tablet 250 mcg, 250 mcg, Oral, BARNEY, Boom Houston M.D., 250 mcg at 01/30/17 0902  •  vitamin D (cholecalciferol) tablet 2,000 Units, 2,000 Units, Oral, QACARROLL, oBom Houston M.D., 2,000 Units at 01/30/17 0904  •  bumetanide (BUMEX) tablet 1 mg, 1 mg, Oral, BID DIURETIC, Boom Houston M.D., 1 mg at 01/30/17 0629  •  aspirin EC (ECOTRIN) tablet 81 mg, 81 mg, Oral, QACARROLL, Boom Houston M.D., 81 mg at 01/30/17 0903  •  allopurinol (ZYLOPRIM) tablet 300 mg, 300 mg, Oral, QACARROLL, Boom Houston M.D., 300 mg at 01/30/17 0902  •  Respiratory Care per Protocol, , Nebulization, Continuous RT, Boom Houston M.D.  •  ondansetron (ZOFRAN) syringe/vial injection 4 mg, 4 mg, Intravenous, Q4HRS PRN, Boom Houston M.D.  •  ondansetron (ZOFRAN ODT) dispertab 4 mg, 4 mg, Oral, Q4HRS PRN, Boom Houston M.D.  •  acetaminophen (TYLENOL) tablet 650 mg, 650 mg, Oral, Q6HRS PRN, Boom Houston M.D.  •  coenzyme Q-10 capsule 390 mg, 390 mg, Oral, DAILY, Boom Houston M.D., 390 mg at 01/30/17 0903  •  Action is required: Protocol 405 Amiodarone Infusion Pharmacy Protocol has been implemented , , , Once **AND** Protocol 405 - Amiodarone Infusion, , , CONTINUOUS **AND** Protocol 405 - Amiodarone Infusion, , , CONTINUOUS **AND** Protocol 405 - Amiodarone Infusion, , , CONTINUOUS **AND** Protocol 405 - Amiodarone Infusion, , , CONTINUOUS **AND** Protocol 405 - Amiodarone Infusion, , , CONTINUOUS **AND** Protocol 405 - Amiodarone Infusion, , , CONTINUOUS **AND** Protocol 405 - Amiodarone Infusion, , , CONTINUOUS **AND** amiodarone  (CORDARONE) 450 mg in D5W 250 mL Infusion, 0.5-1 mg/min, Intravenous, Continuous, Boom Houston M.D., Last Rate: 17 mL/hr at 01/30/17 0636, 0.5 mg/min at 01/30/17 0636  •  D5W infusion 500 mL, 500 mL, Intravenous, Continuous, Boom Houston M.D., Last Rate: 30 mL/hr at 01/29/17 2242, 500 mL at 01/29/17 2242  •  aliskiren (TEKTURNA) tablet 300 mg, 300 mg, Oral, Q DAY, Boom Houston M.D., 300 mg at 01/30/17 0018  •  calcium carbonate (OS-DAVID 500) tablet 1,000 mg, 1,000 mg, Oral, QDAY with Breakfast, 1,000 mg at 01/30/17 0911 **AND** cholecalciferol (VITAMIN D3) tablet 400 Units, 400 Units, Oral, QDAY with Breakfast, Boom Houston M.D., 400 Units at 01/30/17 0911    Current Outpatient Medications:  Prescriptions prior to admission   Medication Sig Dispense Refill Last Dose   • potassium chloride SA (KDUR) 20 MEQ Tab CR Take 20 mEq by mouth 2 times a day.   1/29/2017 at am   • calcium carbonate-vitamin D (OSCAL 500/200 D-3) 500-200 MG-UNIT Tab Take 2 Tabs by mouth every morning with breakfast.   1/29/2017 at am   • metoprolol (LOPRESSOR) 50 MG Tab Take 50 mg by mouth 3 times a day, with meals.   1/29/2017 at 1500   • aspirin EC (ECOTRIN) 81 MG Tablet Delayed Response Take 81 mg by mouth every morning.   1/29/2017 at am   • Cholecalciferol (VITAMIN D) 2000 UNIT Tab Take 2,000 Units by mouth every morning.   1/29/2017 at am   • Coenzyme Q10 (CO Q-10 MAXIMUM STRENGTH) 400 MG Cap Take 1 Cap by mouth every morning.   1/29/2017 at am   • bumetanide (BUMEX) 1 MG Tab Take 1 mg by mouth 2 times a day.   1/29/2017 at 1500   • digoxin (LANOXIN) 250 MCG Tab Take 250 mcg by mouth every morning.   1/29/2017 at am   • diltiazem (DILACOR XR) 240 MG XR capsule Take 240 mg by mouth every morning.   1/29/2017 at am   • apixaban (ELIQUIS) 5mg Tab Take 5 mg by mouth 2 Times a Day.   1/29/2017 at am   • escitalopram (LEXAPRO) 10 MG Tab Take 10 mg by mouth every day.   1/29/2017 at am   • triazolam (HALCION) 0.125 MG tablet Take 0.125 mg by mouth  "at bedtime as needed. Indications: Trouble Sleeping   unknown at unknown   • levothyroxine (SYNTHROID) 50 MCG TABS Take 50 mcg by mouth Every morning on an empty stomach.   1/29/2017 at 0455   • aliskiren (TEKTURNA) 300 MG tablet Take 300 mg by mouth every day.   1/28/2017 at 1830   • allopurinol (ZYLOPRIM) 300 MG TABS Take 300 mg by mouth every morning.   1/29/2017 at am   • simvastatin (ZOCOR) 40 MG TABS Take 40 mg by mouth every evening.   1/28/2017 at 1830   • Multiple Vitamins-Minerals (MULTIVITAL PO) Take 1 Tab by mouth every day.   1/29/2017 at am   • GLUCOSAMINE MSM COMPLEX PO Take 1 Tab by mouth every day.   1/29/2017 at am       Medication Allergy:  Allergies   Allergen Reactions   • Nkda [No Known Drug Allergy]        Family History:  Family History   Problem Relation Age of Onset   • No Known Problems Mother    • No Known Problems Father        Social History:  Social History     Social History   • Marital Status:      Spouse Name: N/A   • Number of Children: N/A   • Years of Education: N/A     Occupational History   • Not on file.     Social History Main Topics   • Smoking status: Former Smoker -- 0.75 packs/day for 40 years     Types: Cigarettes, Pipe     Quit date: 03/01/2007   • Smokeless tobacco: Never Used      Comment: quit in march 2007   • Alcohol Use: No   • Drug Use: No   • Sexual Activity: Not on file     Other Topics Concern   • Not on file     Social History Narrative         Physical Exam:  Vitals/ General Appearance:   Weight/BMI: Body mass index is 41.86 kg/(m^2).  Pulse 83, temperature 36.7 °C (98 °F), resp. rate 24, height 1.905 m (6' 3\"), weight 151.9 kg (334 lb 14.1 oz), SpO2 95 %.  Filed Vitals:    01/30/17 0702 01/30/17 0800 01/30/17 0900 01/30/17 1000   Pulse: 76 79 93 83   Temp:  36.7 °C (98 °F)     Resp: 21 19 14 24   Height:       Weight:       SpO2: 94% 94% 95% 95%     Oxygen Therapy:  Pulse Oximetry: 95 %, O2 (LPM): 2, O2 Delivery: Silicone Nasal " Cannula    Constitutional:   Well developed, Well nourished, No acute distress  HENMT:  Normocephalic, Atraumatic, Oropharynx moist mucous membranes, No oral exudates, Nose normal.  No thyromegaly.  Eyes:  EOMI, Conjunctiva normal, No discharge.  Neck:  Normal range of motion, No cervical tenderness, no carotid bruits, no JVD.  Cardiovascular:  Normal heart rate, Normal rhythm, III/VI systolic murmur over the apex, No rubs, No gallops.   Extremitites with intact distal pulses, 1+ edema.  Lungs:  Decreased breath sounds, no crackles  Abdomen: Bowel sounds normal, Soft, No tenderness, No guarding, No rebound, No masses, No hepatosplenomegaly.  Skin: Warm, Dry, No erythema, No rash, no induration.  Neurologic: Alert & oriented x 3, No focal deficits noted, cranial nerves II through X are grossly intact.  Psychiatric: Affect normal, Judgment normal, Mood normal.      MDM (Data Review):     Records reviewed and summarized in current documentation    Lab Data Review:  Recent Labs      01/29/17 1853 01/30/17 0457   WBC  9.1  10.1   RBC  4.16*  4.01*   HEMOGLOBIN  11.9*  11.5*   HEMATOCRIT  37.4*  35.8*   MCV  89.9  89.3   MCH  28.6  28.7   MCHC  31.8*  32.1*   RDW  56.7*  55.2*   PLATELETCT  107*  102*   MPV  10.8  11.6     Recent Labs      01/29/17 1853 01/30/17 0055   SODIUM  139  139   POTASSIUM  3.4*  4.0   CHLORIDE  104  106   CO2  26  25   GLUCOSE  153*  182*   BUN  26*  23*   CREATININE  1.15  1.01   CALCIUM  9.5  9.9     Recent Labs      01/29/17 1853   APTT  28.2   INR  1.23*     Recent Labs      01/29/17 1853   BNPBTYPENAT  106*     Recent Labs      01/29/17 1853 01/29/17 2120  01/30/17 0055 01/30/17 0457   TROPONINI  0.02  0.37*  0.48*  0.35*   BNPBTYPENAT  106*   --    --    --          Recent Labs      01/29/17 1853 01/30/17 0055   SODIUM  139  139   POTASSIUM  3.4*  4.0   CHLORIDE  104  106   CO2  26  25   GLUCOSE  153*  182*   BUN  26*  23*     Recent Labs      01/29/17   3503   01/30/17   0055   SODIUM  139  139   POTASSIUM  3.4*  4.0   CHLORIDE  104  106   CO2  26  25   BUN  26*  23*   CREATININE  1.15  1.01   MAGNESIUM  2.0  2.3   CALCIUM  9.5  9.9     Recent Labs      01/29/17   1853   APTT  28.2   INR  1.23*     Results for orders placed or performed during the hospital encounter of 01/09/17   Echocardiogram Comp W/O Cont   Result Value Ref Range    Eject.Frac. MOD BP 62.48     Eject.Frac. MOD 4C 62.92     Eject.Frac. MOD 2C 60.31     Left Ventrical Ejection Fraction 65                  MDM (Assessment and Plan):     Active Hospital Problems    Diagnosis   • Syncope [R55]   • Hypokalemia [E87.6]   • Depression [F32.9]   • Hemorrhagic disorder due to extrinsic circulating anticoagulants (CMS-[facility]) [D68.32]   • Ventricular tachycardia (CMS-[facility]) [I47.2]         At this point, Bert is a very pleasant 66 year old man, patient of Dr. Ureña With hypertension, hyperlipidemia, COPD, morbid obesity, obstructive sleep apnea on BiPAP, history of DVT and PE, A. fib on chronic anticoagulation with Eliquis, hypo-thyroidism, severe aortic stenosis with TAVR approximately 3 weeks ago who presented to the emergency department with an episode of syncope and was found to be in sustained Vtach.  He had a short prodrome preceding the syncope, and after he regained conciousness he was found to have a bowel movement in the form of diarrhea and his wife also noted seeing some muscle twitches  He was started on amiodarone drip  At this point he is asymptomatic and is in his baseline rhythm of A. fib     He had a coronary angiogram showing mild CAD prior to the TAVR procedure  Digoxin Levels not elevated  TSH unremarkable  K and Mag unremarkable  No obvious sings of active infection  Trop 0.48 and 03.5       I appreciate the involvement of multiple cardiologists in this case.  I think between all of us we agree that the plan would be to proceed with an ICD for secondary prevention of sudden cardiac death  provided that at this point he has negative workup for reversible causes of sustained V. tach causing this episode of syncope  I would continue amiodarone drip at this point.    V. tach is likely to be unrelated to the TAVR procedure  Continue beta-blockers.    Ensure potassium is above 4 and magnesium is above 2.    We will obtain an echocardiogram  Since the patient is on chronic anticoagulation using Eliquis (Last dose was on Sunday night), due to the bleeding risk, the patient is scheduled for an ICD known tomorrow.  I discussed the details of the management plan with the patient   Again I appreciate everybody's time and effort helping take care of this patient       Thank you for giving me the opportunity to participate in the patient’s care. If I can be of any further assistance, please feel free to call me.    Sincerely,  Amr Mohsen, MD, FACC, [facility]  Interventional Cardiology    This dictation was prepared using Dragon Medical voice recognition software. As a result, errors may occur. When identified, these errors have been corrected. While every attempt is made to correct errors during dictation, errors may still exist.

## 2017-01-30 NOTE — PROGRESS NOTES
Report given and care transferred to ZEINAB Marquez. All orders reviewed, updated patient and wife (Mary) on plan of care, questions answered. VSS.

## 2017-01-30 NOTE — PROGRESS NOTES
Hospital Medicine Progress Note, Adult, Complex               Author: David Torres Date & Time created: 1/30/2017  8:11 AM     66yom here with syncope likely from sustained VT    Interval History:  No events ove rnight. No pain. In afib rate controlled mostly. Bp 130-150s. Home meds restarted. Last bm prior to arrival yesterday. Voiding. On amio gtt. Ambulating well. aicd planned for tomorrow. 3750 on IS.     Review of Systems:  Review of Systems   Constitutional: Positive for malaise/fatigue. Negative for fever and chills.   HENT: Negative for sore throat.    Eyes: Negative for blurred vision and pain.   Respiratory: Negative for cough and shortness of breath.    Cardiovascular: Negative for chest pain and palpitations.   Gastrointestinal: Negative for nausea, vomiting and abdominal pain.   Genitourinary: Negative for dysuria and urgency.   Musculoskeletal: Negative for back pain and neck pain.   Skin: Negative for itching and rash.   Neurological: Negative for dizziness, tingling and headaches.   Psychiatric/Behavioral: Negative for depression. The patient does not have insomnia.    All other systems reviewed and are negative.      Physical Exam:  Physical Exam   Constitutional: He is oriented to person, place, and time. He appears well-developed and well-nourished. No distress.   HENT:   Right Ear: External ear normal.   Left Ear: External ear normal.   Nose: Nose normal.   Eyes: Right eye exhibits no discharge. Left eye exhibits no discharge. No scleral icterus.   Neck: No JVD present. No tracheal deviation present.   Cardiovascular: Normal rate, normal heart sounds and intact distal pulses.  An irregularly irregular rhythm present.   No murmur heard.  Pulmonary/Chest: Effort normal and breath sounds normal. No respiratory distress. He has no rales.   Abdominal: Soft. Bowel sounds are normal. There is no tenderness. There is no guarding.   Musculoskeletal: He exhibits no edema or tenderness.   Neurological:  He is alert and oriented to person, place, and time.   Skin: Skin is warm and dry. He is not diaphoretic. No erythema.   Psychiatric: He has a normal mood and affect. His behavior is normal.   Nursing note and vitals reviewed.      Labs:        Invalid input(s): [id number]  Recent Labs      17   TROPONINI  0.02  0.37*  0.48*  0.35*   BNPBTYPENAT  106*   --    --    --      Recent Labs      17   SODIUM  139  139   POTASSIUM  3.4*  4.0   CHLORIDE  104  106   CO2  26  25   BUN  26*  23*   CREATININE  1.15  1.01   MAGNESIUM  2.0  2.3   CALCIUM  9.5  9.9     Recent Labs      17   ALTSGPT  53*  46   ASTSGOT  70*  38   ALKPHOSPHAT  66  57   TBILIRUBIN  0.8  0.8   GLUCOSE  153*  182*     Recent Labs      17   RBC  4.16*  4.01*   HEMOGLOBIN  11.9*  11.5*   HEMATOCRIT  37.4*  35.8*   PLATELETCT  107*  102*   PROTHROMBTM  15.9*   --    APTT  28.2   --    INR  1.23*   --      Recent Labs      17   WBC  9.1   --   10.1   NEUTSPOLYS  73.90*   --   70.60   LYMPHOCYTES  14.60*   --   14.10*   MONOCYTES  7.40   --   11.80   EOSINOPHILS  2.80   --   2.30   BASOPHILS  0.60   --   0.50   ASTSGOT  70*  38   --    ALTSGPT  53*  46   --    ALKPHOSPHAT  66  57   --    TBILIRUBIN  0.8  0.8   --            Hemodynamics:  Temp (24hrs), Av.4 °C (97.6 °F), Min:36.4 °C (97.5 °F), Max:36.7 °C (98.1 °F)  Temperature: 36.7 °C (98.1 °F)  Pulse  Av.8  Min: 65  Max: 142Heart Rate (Monitored): 85  NIBP: 135/75 mmHg     Respiratory:    Respiration: (!) 21, Pulse Oximetry: 94 %, O2 Daily Delivery Respiratory : Silicone Nasal Cannula     Work Of Breathing / Effort: Mild  RUL Breath Sounds: Clear, RML Breath Sounds: Diminished, RLL Breath Sounds: Diminished, PROSCHE Breath Sounds: Clear, LLL Breath Sounds: Diminished  Fluids:    Intake/Output Summary  (Last 24 hours) at 01/30/17 0811  Last data filed at 01/30/17 0600   Gross per 24 hour   Intake 1211.15 ml   Output   1900 ml   Net -688.85 ml     Weight: (!) 151.9 kg (334 lb 14.1 oz)  GI/Nutrition:  Orders Placed This Encounter   Procedures   • DIET NPO     Standing Status: Standing      Number of Occurrences: 1      Standing Expiration Date:      Order Specific Question:  Restrict to:     Answer:  Strict [1]     Medical Decision Making, by Problem:  Active Hospital Problems    Diagnosis   • Ventricular tachycardia (CMS-HCC) [I47.2]- trend on tele. Correct lytes. amio gtt ongoing. Suspect ICD per cards. High risk for sudden cardiac death   - syncope  - 2/2 above. Plan as above.   - Hypokalemia- replete  - Atrial fibrillation- on amio gtt. Hold elaquis for possible ICD placement.   - Coagulopathy 2/2 circulating anticoagulants- holding as above.   - Depression- was not on meds.   - Hyperlipidemia- statin  - Aortic stenosis status post recent transaortic valvular repair with Dr. Laguna on the 01/09/2017.  - Transaminitis.- resolved.   -  History of deep venous thrombosis and pulmonary embolism.- hold meds as above.   -  Obstructive sleep apnea/obesity hypoventilation syndrome, on BiPAP    therapy.  - Hypertension.- benign. Cont meds.   -  History of chronic obstructive pulmonary disease without acute    Exacerbation.- rt protocol.   - Hypothyroidism.- synthroid.   -  Mitral stenosis.- echo pending.   - Mitral regurgitation.- echo pending.   -  Chronic anemia.- check fe studies. B12.   -  Chronic thrombocytopenia.- plt 102  - Gout.- no flare   - chronic right foot drop  - hypokalemia- replace    Discussed plan with RN        EKG reviewed, Labs reviewed, Medications reviewed and Radiology images reviewed  Rehman catheter: No Rehman      DVT: elaquis.    Ulcer prophylaxis: Not indicated    Assessed for rehab: Patient was assess for and/or received rehabilitation services during this hospitalization

## 2017-01-30 NOTE — RESPIRATORY CARE
COPD EDUCATION by COPD CLINICAL EDUCATOR  1/30/2017 at 12:47 PM by Paradise Bose     Patient reviewed by COPD education team. Patient does not qualify for COPD program.

## 2017-01-30 NOTE — CONSULTS
DATE OF SERVICE:  01/29/2017    CARDIOLOGY CONSULTATION    REASON FOR CONSULTATION:  I was asked by Dr. Donato Horvath, the emergency room   staff as well as Dr. Boom Houston, the hospitalist staff to evaluate this   patient with VT and syncope.  His chief complaint was passing out spell.    HISTORY OF PRESENT ILLNESS:  This is a 66-year-old gentleman with recent TAVR   for severe symptomatic aortic stenosis with underlying lung disease who   recovered normally, actually with remarkable improvement in his baseline   dyspnea on exertion and feeling his normal state of health and this evening   apparently while sitting in a recliner, his head went back and he had some eye   rolling and some twitching, so his wife called EMS.  He was very diaphoretic,   but then came out of the episode and so he has been monitored.  When EMS   arrived, he was found in rhythm and had been transported to the hospital.  He   had recurrent ventricular tachycardia, very fast rate up to 300, again with   unresponsiveness and then returned back to normal rhythm.  He has been   compliant with his anticoagulation.  No specific illness.  He did have bowel   incontinence with his first episode.  On review, he does describe episodes in   the past where he has had brief dizziness and kind of blurring sensation in   his eyes with transient loss of vision, which he attributed to TIAs in   retrospect, may have represented arrhythmia at that time as well.    PAST MEDICAL HISTORY:  History of pulmonary embolism, on chronic   anticoagulation; aortic stenosis, status post TAVR on 01/09/2017; congestive   heart failure with preserved ejection fraction, sleep apnea on BiPAP,   depression, anxiety, atrial fibrillation since last year on anticoagulation   and rate control, hypertension, history of kidney stones, arthritis.    PAST SURGICAL HISTORY:  Lumbar laminectomy, tonsillectomy, hip arthroplasty,   cataracts, Dupuytren contracture release and on 01/09/2017,  he had a   successful TAVR without any complications at that time.    FAMILY HISTORY:  Mother had heart disease.    ALLERGIES:  No known drug allergies.    HOME MEDICATIONS:  Potassium, calcium, metoprolol 50 t.i.d., aspirin 81,   CoQ10, Bumex 1 b.i.d., digoxin 250 mcg daily, diltiazem 240 mg daily, Eliquis   5 mg b.i.d., Lexapro, Halcion, Synthroid, Tekturna, allopurinol, simvastatin,   multivite and glucosamine.    SOCIAL HISTORY:  Former smoker, quitting 10 years ago.  No significant alcohol   or drug use.  He is , lives with his family.    REVIEW OF SYSTEMS:  Otherwise negative except as mentioned as pertinent   positive and negatives in the HPI.    PHYSICAL EXAMINATION:  VITAL SIGNS:  He is afebrile.  His heart rates are in the 80s in atrial   fibrillation with PVCs, blood pressure has been normotensive.  His weight is   335 pounds, his stated weight, BMI of 42.  HEENT:  His eyes are anicteric.  His mouth is moist.  NECK:  Supple.  CHEST:  Clear to auscultation bilaterally.  HEART:  ___.  S1, S2.  No murmurs, rubs, or gallops.  ABDOMEN:  Soft, nontender, obese.  EXTREMITIES:  He only has mild edema.  NEUROLOGIC:  Intact.  SKIN:  Grossly intact.    His EKG shows atrial fibrillation with variable rate and frequent PVCs.  His   telemetry from the EMS showed ventricular tachycardia with extreme rate, only   available on one lead.  His tele here has been normal.    LABORATORY DATA:  Show normal white count, mild anemia of 37, mild   thrombocytopenia of 107.  His potassium is mildly low at 3.4, creatinine 1.15.    LFTs mildly elevated at 0.7.  His troponin 0.02 and 0.37.  BNP is 106.  His   INR is 1.23.  TSH is 2.8.    IMAGING:  Chest x-ray shows no acute process.  Prior studies reviewed   including his echocardiogram, which shows preserved ejection fraction after   the TAVR; inappropriate functioning of the TAVR.  Prior cardiac   catheterization showed no coronary artery disease.    IMPRESSION:  1.  Syncope,  likely ventricular tachycardia.  2.  Sustained ventricular tachycardia on EMS telemetry.  3.  Status post transcatheter aortic valve replacement on 01/09/2017.  4.  History of pulmonary embolism, on anticoagulation.    RECOMMENDATIONS:  It is my pleasure to see the patient for evaluation of his   syncope.  This is most likely due to ventricular tachycardia given observed   ventricular tachycardia post the event.  On review, he may have had shorter   episodes of arrhythmia as he had some dizziness and transient loss of   consciousness before, but never obviously this severe.  His potassium should   be repleted.  I will continue his home medications except I will hold his   Eliquis for the morning in case he should get a defibrillator tomorrow.  We   will have electrophysiology evaluate the patient for further testing of atrial   fibrillation, and possible need for angiography, although prior angiogram did   show coronary artery disease and his troponin ___ positive in the setting of   syncope with likely underlying VT.  We will alert Dr. Laguna and Dr. Arciniega's   service to his presentation in the morning.  In the interim overnight, we will   continue amiodarone and he should have his electrolytes repleted as   appropriate.    Thank you for the opportunity to participate in the care.       ____________________________________     MD GANESH Jackson / MONIQUE    DD:  01/29/2017 22:45:09  DT:  01/29/2017 23:30:00    D#:  [id number]  Job#:  [id number]

## 2017-01-30 NOTE — H&P
PRIMARY CARE PHYSICIAN:  Reanna Mcdowell DO    CARDIOLOGIST:  Taiwo Ureña MD and Sidney Laguna MD    CHIEF COMPLAINT:  I passed out.    HISTORY OF PRESENTING ILLNESS:  This is a pleasant 66-year-old male presenting   to the emergency room after having a syncopal episode.  The patient is   accompanied by his wife and family who are helping with history of presenting   illness.  It appears that the patient was in his baseline level of health   until this evening.  He was sitting in his recliner when he suddenly reported   to his wife that something is happening to me.  Suddenly, this patient's head   went back in his recliner and his eyes rolled out and he started having   twitching of his body.  The wife thought that the patient was having a   seizure.  She subsequently called 911.  During this time, the patient became   severely diaphoretic.  While the patient's wife was on the phone with 911, the   patient came out of this episode.  The patient had a bowel movement during   this episode.  According to the wife, it was diarrhea.  No blood was noted.    No black bowel movements were noted.  Once the patient came out of this   episode, the patient complained of having visual changes and having severe   pounding sensation in his heart.  The patient was also very diaphoretic.  The   patient reports that he had a very transient episode of chest pain with this   which has completely resolved now.  He characterizes this as a central pain,   dull in character, 9/10 in intensity and nonradiating.  Did not notice any   precipitating factors, improving factors, or worsening factors.  Denies any   associated fevers, chills, nausea, or vomiting.  No stroke like symptoms were   noted.  No dysarthria, expressive aphasia, motor weakness or sensory deficits were   noted.  Otherwise, the patient denies any headache, chest pain, shortness of   breath or cough.  He denies any abdominal pain, diarrhea, constipation, blood    in bowel movements or melena.  He did had a lose diarrhea like bowel movement   during this episode, otherwise no dysuria, frequency, urgency or hematuria.    No lower extremity swelling.  No palpitations, orthopnea or paroxysmal   nocturnal dyspnea at baseline.  He is continuing to have sensation of pounding   in his heart intermittently.  When EMS came, he was noted to have a heart   rate of 250.  En route to the hospital, the patient was noted to have   ventricular tachycardia by the EMS.  The strips for these are in patient's   chart.    HOME MEDICATIONS:  1.  Potassium chloride 20 mEq twice a day.  2.  Calcium carbonate/vitamin D.  3.  Metoprolol 50 mg three times a day.  4.  Aspirin 81 mg daily.  5.  Vitamin D 2000 units every morning.  6.  Coenzyme Q.  7.  Bumex 1 mg twice a day.  8.  Digoxin 250 mcg daily.  9.  Diltiazem 240 mg daily.  10.  Eliquis 5 mg twice a day.  11.  Lexapro 10 mg daily.  12.  Triazolam 0.125 mg at bedtime.  13.  Synthroid 50 mcg daily.  14.  Tekturna 300 mg daily.  15.  Allopurinol 300 mg daily.  16.  Simvastatin 40 mg daily.  17.  Multivitamin.  18.  Glucosamine.    PAST MEDICAL HISTORY:  1.  Severe aortic stenosis.  2.  Pulmonary embolism.  3.  Obstructive sleep apnea.  4.  Hypertension.  5.  History of nephrolithiasis.  6.  Arthritis.  7.  History of deep venous thrombosis and pulmonary embolism.  8.  Obesity with obesity hypoventilation syndrome.  9.  Obstructive sleep apnea, on BiPAP therapy.  10.  Chronic obstructive pulmonary disease.  11.  Gout.  12.  Hypothyroidism.  13.  Chronic right foot drop.    PAST SURGICAL HISTORY:  1.  Lumbar laminectomy.  2.  Tonsillectomy.  3.  Total hip arthroplasty on the left side.  4.  Transaortic valvular replacement.    FAMILY HISTORY:  Father with history of Alzheimer disease.  Mother with   history of heart problem.    SOCIAL HISTORY:  The patient is an ex-smoker who quit smoking in 2007,   currently denies any drugs of abuse or alcohol  use.  Denies any recreational   drugs.  He is independent with activities of daily living and IADLs.  The   patient is living with his wife.    ALLERGIES:  The patient has no known drug allergies.    REVIEW OF SYSTEMS:  Detailed review of system was reviewed with the patient   and negative other than as listed in the history of presenting illness and   past medical history.    PHYSICAL EXAMINATION:  VITAL SIGNS:  The patient is afebrile.  Pulse is 85, respiratory rate is 16,   blood pressure is 139/71, weight is 152 kilograms, oxygen saturation is 98% on   room air.  GENERAL:  Patient is alert and oriented x3, no acute distress.  HEAD, EYES, AND ENT:  Head is normocephalic.  Extraocular movements are   intact.  Pupils equal, round, and reactive to light and accommodation.  No   conjunctival pallor or scleral icterus is noted.  No nasal discharge.  No   pharyngeal exudates or erythema.  NECK:  No jugular venous distention.  CARDIOVASCULAR SYSTEM:  Regular rate and rhythm.  Aortic area murmur is noted.    Mitral area murmur is noted.  RESPIRATIONS:  Diminished in bases.  Otherwise, clear to auscultation   bilaterally.  No wheezing or rhonchi.  ABDOMEN:  Soft, nontender, nondistended.  Bowel sounds positive and normal in   frequency.  GENITOURINARY SYSTEM:  Not examined.  MUSCULOSKELETAL:  No joint swelling or tenderness on examination.  SKIN:  No rashes, erythema, or wound.  NEUROLOGICAL:  Cranial nerves without any gross deficits.  Motor strength of   5/5 in bilateral upper and lower extremities.  No gross sensory deficits.  EXTREMITIES:  Pulses 1+ in bilateral lower extremities.  Bilateral lower   extremity minimal edema.  No cyanosis.    LABORATORY STUDIES:  White blood cell count of 9.1, hemoglobin of 11.9,   platelet count of 107.  Sodium of 139, potassium of 3.4, BUN of 26, creatinine   of 1.15.  AST of 70, ALT of 53, troponin I 0.02.  Beta-natriuretic peptide   106.  Digoxin level 0.8.  INR 1.23.  TSH is within  normal limits.    IMAGING STUDIES:  Chest x-ray obtained on presentation to the emergency room   reveals mild diffuse interstitial prominence which could relate to chronic   changes or mild pulmonary edema.    EKG obtained today and personally reviewed by me reveals the patient to be in   atrial fibrillation.  I could not appreciate any ST elevations on this EKG.    Telemetry monitoring:  While at bedside, continuous telemetry monitoring done   by me.  The patient is throwing up plethora, premature ventricular   contractions.    IMPRESSION:  1.  Ventricular tachycardia versus run of premature ventricular contractions   versus atrial fibrillation with aberrancy.  2.  Aortic stenosis status post recent transaortic valvular repair with Dr. Laguna on the 01/09/2017.  3.  Hypokalemia.  4.  Transaminitis.  5.  Atrial fibrillation.  6.  History of deep venous thrombosis and pulmonary embolism.  7.  Ongoing use of therapeutic anticoagulation.  8.  Obstructive sleep apnea/obesity hypoventilation syndrome, on BiPAP   therapy.  9.  Hypertension.  10.  History of chronic obstructive pulmonary disease without acute   exacerbation.  11.  Hypothyroidism.  12.  Depression.  13.  Hyperlipidemia.  14.  Mitral stenosis.  15.  Mitral regurgitation.  16.  Chronic anemia.  17.  Chronic thrombocytopenia.  18.  Full code status.    PLAN:  At this point, the patient will be admitted in critical condition to   the intensive care unit.  His rhythm strip as obtained by the EMS has been   reviewed by me and this looks consistent with ventricular tachycardia.  Also,   his history of presenting illness is consistent with ventricular tachycardia.    At this point in time, I have discussed the case with Dr. Hood from   cardiology.  The patient will be given emergently a bolus of amiodarone and   initiated on amiodarone drip.  The patient will be admitted in critical   condition to the intensive care unit and he will be monitored closely in the    intensive care unit.  The patient is status post recent transaortic valvular   repair and there remains concerned for procedure related complications.  I   have ordered an echocardiogram.  Defer the urgency for this to our colleagues   in cardiology.  Dr. Hood will be evaluating this patient.  The patient is   noted to have hypokalemia which has been emergently replaced by me.  In   addition, we will also provide 2 g intravenous load of magnesium sulfate to   this patient.  His potassium and magnesium levels will be stabilized.  His   potassium level will be kept greater than 4 and magnesium level greater than   2-2.5.  he is noted to have transaminitis which is likely secondary to his   underlying ventricular tachycardia with possible lack of perfusion to the   liver.  This should be monitored if persistence is noted and ultrasound of the   liver may be considered.  Otherwise, we will continue his baseline   anticoagulation.  He is on 3 AV lazaro blocker therapies.  Defer need to   continue this versus changes to our colleagues in cardiology.  We will   continue his nighttime BiPAP therapy for underlying obesity, hypoventilation   syndrome and obstructive sleep apnea.  A TSH was checked and this is normal.    Continue Synthroid, continue baseline therapy for other comorbid condition   including diuresis.  DVT preventive prophylaxis not indicated while the   patient is on therapeutic anticoagulation.  Stress ulcer prophylaxis not   indicated on this patient.  Code status was discussed with the patient and the   patient wishes to maintain a full code status.    This patient has critical/life threatening illness as discussed above   requiring my direct presence, involvement and maximal preparedness.  I   personally spent 40 minutes providing critical care to this patient.  Time   spent on critical care excludes, time spent on procedures if any.    Date of service: 01/29/2017      ____________________________________     MD KORINA DUGAN    DD:  01/29/2017 20:56:02  DT:  01/29/2017 21:42:47    D#:  [id number]  Job#:  [id number]

## 2017-01-31 ENCOUNTER — APPOINTMENT (OUTPATIENT)
Dept: RADIOLOGY | Facility: MEDICAL CENTER | Age: 67
DRG: 227 | End: 2017-01-31
Attending: INTERNAL MEDICINE
Payer: MEDICARE

## 2017-01-31 ENCOUNTER — APPOINTMENT (OUTPATIENT)
Dept: CARDIOLOGY | Facility: MEDICAL CENTER | Age: 67
End: 2017-01-31
Attending: INTERNAL MEDICINE
Payer: MEDICARE

## 2017-01-31 LAB
ALBUMIN SERPL BCP-MCNC: 3.7 G/DL (ref 3.2–4.9)
ALBUMIN/GLOB SERPL: 1.3 G/DL
ALP SERPL-CCNC: 51 U/L (ref 30–99)
ALT SERPL-CCNC: 31 U/L (ref 2–50)
ANION GAP SERPL CALC-SCNC: 7 MMOL/L (ref 0–11.9)
APTT PPP: 54.5 SEC (ref 24.7–36)
AST SERPL-CCNC: 20 U/L (ref 12–45)
BASOPHILS # BLD AUTO: 0.6 % (ref 0–1.8)
BASOPHILS # BLD: 0.06 K/UL (ref 0–0.12)
BILIRUB SERPL-MCNC: 0.8 MG/DL (ref 0.1–1.5)
BUN SERPL-MCNC: 20 MG/DL (ref 8–22)
CALCIUM SERPL-MCNC: 9.2 MG/DL (ref 8.5–10.5)
CHLORIDE SERPL-SCNC: 106 MMOL/L (ref 96–112)
CO2 SERPL-SCNC: 26 MMOL/L (ref 20–33)
CREAT SERPL-MCNC: 0.95 MG/DL (ref 0.5–1.4)
EKG IMPRESSION: NORMAL
EKG IMPRESSION: NORMAL
EOSINOPHIL # BLD AUTO: 0.32 K/UL (ref 0–0.51)
EOSINOPHIL NFR BLD: 3.5 % (ref 0–6.9)
ERYTHROCYTE [DISTWIDTH] IN BLOOD BY AUTOMATED COUNT: 57.2 FL (ref 35.9–50)
GFR SERPL CREATININE-BSD FRML MDRD: >60 ML/MIN/1.73 M 2
GLOBULIN SER CALC-MCNC: 2.8 G/DL (ref 1.9–3.5)
GLUCOSE SERPL-MCNC: 126 MG/DL (ref 65–99)
HCT VFR BLD AUTO: 36.4 % (ref 42–52)
HGB BLD-MCNC: 11.5 G/DL (ref 14–18)
IMM GRANULOCYTES # BLD AUTO: 0.06 K/UL (ref 0–0.11)
IMM GRANULOCYTES NFR BLD AUTO: 0.6 % (ref 0–0.9)
IRON SATN MFR SERPL: 24 % (ref 15–55)
IRON SERPL-MCNC: 84 UG/DL (ref 50–180)
LYMPHOCYTES # BLD AUTO: 1.72 K/UL (ref 1–4.8)
LYMPHOCYTES NFR BLD: 18.6 % (ref 22–41)
MAGNESIUM SERPL-MCNC: 1.8 MG/DL (ref 1.5–2.5)
MCH RBC QN AUTO: 28.5 PG (ref 27–33)
MCHC RBC AUTO-ENTMCNC: 31.6 G/DL (ref 33.7–35.3)
MCV RBC AUTO: 90.1 FL (ref 81.4–97.8)
MONOCYTES # BLD AUTO: 0.82 K/UL (ref 0–0.85)
MONOCYTES NFR BLD AUTO: 8.8 % (ref 0–13.4)
NEUTROPHILS # BLD AUTO: 6.29 K/UL (ref 1.82–7.42)
NEUTROPHILS NFR BLD: 67.9 % (ref 44–72)
NRBC # BLD AUTO: 0 K/UL
NRBC BLD AUTO-RTO: 0 /100 WBC
PLATELET # BLD AUTO: 96 K/UL (ref 164–446)
PMV BLD AUTO: 11.6 FL (ref 9–12.9)
POTASSIUM SERPL-SCNC: 3.5 MMOL/L (ref 3.6–5.5)
PROT SERPL-MCNC: 6.5 G/DL (ref 6–8.2)
RBC # BLD AUTO: 4.04 M/UL (ref 4.7–6.1)
SODIUM SERPL-SCNC: 139 MMOL/L (ref 135–145)
TIBC SERPL-MCNC: 357 UG/DL (ref 250–450)
TROPONIN I SERPL-MCNC: 0.06 NG/ML (ref 0–0.04)
WBC # BLD AUTO: 9.3 K/UL (ref 4.8–10.8)

## 2017-01-31 PROCEDURE — 770022 HCHG ROOM/CARE - ICU (200)

## 2017-01-31 PROCEDURE — 93010 ELECTROCARDIOGRAM REPORT: CPT | Mod: 76 | Performed by: INTERNAL MEDICINE

## 2017-01-31 PROCEDURE — A9270 NON-COVERED ITEM OR SERVICE: HCPCS | Performed by: HOSPITALIST

## 2017-01-31 PROCEDURE — 80053 COMPREHEN METABOLIC PANEL: CPT

## 2017-01-31 PROCEDURE — 85025 COMPLETE CBC W/AUTO DIFF WBC: CPT

## 2017-01-31 PROCEDURE — 700102 HCHG RX REV CODE 250 W/ 637 OVERRIDE(OP): Performed by: INTERNAL MEDICINE

## 2017-01-31 PROCEDURE — 99153 MOD SED SAME PHYS/QHP EA: CPT

## 2017-01-31 PROCEDURE — 4B02XTZ MEASUREMENT OF CARDIAC DEFIBRILLATOR, EXTERNAL APPROACH: ICD-10-PCS | Performed by: INTERNAL MEDICINE

## 2017-01-31 PROCEDURE — C1898 LEAD, PMKR, OTHER THAN TRANS: HCPCS

## 2017-01-31 PROCEDURE — C1892 INTRO/SHEATH,FIXED,PEEL-AWAY: HCPCS

## 2017-01-31 PROCEDURE — 83550 IRON BINDING TEST: CPT

## 2017-01-31 PROCEDURE — 93306 TTE W/DOPPLER COMPLETE: CPT

## 2017-01-31 PROCEDURE — 0JH608Z INSERTION OF DEFIBRILLATOR GENERATOR INTO CHEST SUBCUTANEOUS TISSUE AND FASCIA, OPEN APPROACH: ICD-10-PCS | Performed by: INTERNAL MEDICINE

## 2017-01-31 PROCEDURE — A9270 NON-COVERED ITEM OR SERVICE: HCPCS | Performed by: INTERNAL MEDICINE

## 2017-01-31 PROCEDURE — 700111 HCHG RX REV CODE 636 W/ 250 OVERRIDE (IP): Performed by: HOSPITALIST

## 2017-01-31 PROCEDURE — 84484 ASSAY OF TROPONIN QUANT: CPT

## 2017-01-31 PROCEDURE — 93005 ELECTROCARDIOGRAM TRACING: CPT | Performed by: INTERNAL MEDICINE

## 2017-01-31 PROCEDURE — 02H63KZ INSERTION OF DEFIBRILLATOR LEAD INTO RIGHT ATRIUM, PERCUTANEOUS APPROACH: ICD-10-PCS | Performed by: INTERNAL MEDICINE

## 2017-01-31 PROCEDURE — 304853 HCHG PPM TEST CABLE

## 2017-01-31 PROCEDURE — C1721 AICD, DUAL CHAMBER: HCPCS

## 2017-01-31 PROCEDURE — 02HK3KZ INSERTION OF DEFIBRILLATOR LEAD INTO RIGHT VENTRICLE, PERCUTANEOUS APPROACH: ICD-10-PCS | Performed by: INTERNAL MEDICINE

## 2017-01-31 PROCEDURE — 700101 HCHG RX REV CODE 250

## 2017-01-31 PROCEDURE — 305387 HCHG SUTURES

## 2017-01-31 PROCEDURE — 85730 THROMBOPLASTIN TIME PARTIAL: CPT

## 2017-01-31 PROCEDURE — C1899 LEAD, PMKR/AICD COMBINATION: HCPCS

## 2017-01-31 PROCEDURE — 94660 CPAP INITIATION&MGMT: CPT

## 2017-01-31 PROCEDURE — 700111 HCHG RX REV CODE 636 W/ 250 OVERRIDE (IP): Performed by: INTERNAL MEDICINE

## 2017-01-31 PROCEDURE — 700111 HCHG RX REV CODE 636 W/ 250 OVERRIDE (IP)

## 2017-01-31 PROCEDURE — 700102 HCHG RX REV CODE 250 W/ 637 OVERRIDE(OP): Performed by: HOSPITALIST

## 2017-01-31 PROCEDURE — 304952 HCHG R 2 PADS

## 2017-01-31 PROCEDURE — 33249 INSJ/RPLCMT DEFIB W/LEAD(S): CPT

## 2017-01-31 PROCEDURE — 99152 MOD SED SAME PHYS/QHP 5/>YRS: CPT

## 2017-01-31 PROCEDURE — 700117 HCHG RX CONTRAST REV CODE 255: Performed by: INTERNAL MEDICINE

## 2017-01-31 PROCEDURE — 83540 ASSAY OF IRON: CPT

## 2017-01-31 PROCEDURE — 99233 SBSQ HOSP IP/OBS HIGH 50: CPT | Performed by: HOSPITALIST

## 2017-01-31 PROCEDURE — 36005 INJECTION EXT VENOGRAPHY: CPT

## 2017-01-31 PROCEDURE — 83735 ASSAY OF MAGNESIUM: CPT

## 2017-01-31 PROCEDURE — 93641 EP EVL 1/2CHMB PAC CVDFB TST: CPT

## 2017-01-31 PROCEDURE — 71010 DX-CHEST-PORTABLE (1 VIEW): CPT

## 2017-01-31 RX ORDER — MIDAZOLAM HYDROCHLORIDE 1 MG/ML
INJECTION INTRAMUSCULAR; INTRAVENOUS
Status: COMPLETED
Start: 2017-01-31 | End: 2017-01-31

## 2017-01-31 RX ORDER — MAGNESIUM SULFATE HEPTAHYDRATE 40 MG/ML
2 INJECTION, SOLUTION INTRAVENOUS ONCE
Status: COMPLETED | OUTPATIENT
Start: 2017-01-31 | End: 2017-01-31

## 2017-01-31 RX ORDER — CEFAZOLIN SODIUM 1 G/3ML
INJECTION, POWDER, FOR SOLUTION INTRAMUSCULAR; INTRAVENOUS
Status: COMPLETED
Start: 2017-01-31 | End: 2017-01-31

## 2017-01-31 RX ORDER — ONDANSETRON 2 MG/ML
4 INJECTION INTRAMUSCULAR; INTRAVENOUS EVERY 6 HOURS PRN
Status: DISCONTINUED | OUTPATIENT
Start: 2017-01-31 | End: 2017-02-01

## 2017-01-31 RX ORDER — AMIODARONE HYDROCHLORIDE 200 MG/1
400 TABLET ORAL TWICE DAILY
Status: DISCONTINUED | OUTPATIENT
Start: 2017-01-31 | End: 2017-02-01 | Stop reason: HOSPADM

## 2017-01-31 RX ORDER — DIPHENHYDRAMINE HYDROCHLORIDE 50 MG/ML
INJECTION INTRAMUSCULAR; INTRAVENOUS
Status: COMPLETED
Start: 2017-01-31 | End: 2017-01-31

## 2017-01-31 RX ORDER — IBUPROFEN 800 MG/1
800 TABLET ORAL
Status: DISCONTINUED | OUTPATIENT
Start: 2017-01-31 | End: 2017-02-01 | Stop reason: HOSPADM

## 2017-01-31 RX ORDER — ACETAMINOPHEN 500 MG
1000 TABLET ORAL EVERY 6 HOURS
Status: DISCONTINUED | OUTPATIENT
Start: 2017-01-31 | End: 2017-02-01 | Stop reason: HOSPADM

## 2017-01-31 RX ORDER — LIDOCAINE HYDROCHLORIDE 20 MG/ML
INJECTION, SOLUTION INFILTRATION; PERINEURAL
Status: COMPLETED
Start: 2017-01-31 | End: 2017-01-31

## 2017-01-31 RX ORDER — POTASSIUM CHLORIDE 20 MEQ/1
40 TABLET, EXTENDED RELEASE ORAL 2 TIMES DAILY
Status: DISCONTINUED | OUTPATIENT
Start: 2017-01-31 | End: 2017-02-01 | Stop reason: HOSPADM

## 2017-01-31 RX ADMIN — DULOXETINE HYDROCHLORIDE 250 MCG: 30 CAPSULE, DELAYED RELEASE ORAL at 10:03

## 2017-01-31 RX ADMIN — SIMVASTATIN 40 MG: 20 TABLET, FILM COATED ORAL at 19:31

## 2017-01-31 RX ADMIN — POTASSIUM CHLORIDE 40 MEQ: 1500 TABLET, EXTENDED RELEASE ORAL at 10:03

## 2017-01-31 RX ADMIN — ASPIRIN 81 MG: 81 TABLET ORAL at 10:04

## 2017-01-31 RX ADMIN — TRIAZOLAM 0.12 MG: 0.25 TABLET ORAL at 21:51

## 2017-01-31 RX ADMIN — AMIODARONE HYDROCHLORIDE 400 MG: 200 TABLET ORAL at 15:15

## 2017-01-31 RX ADMIN — CHOLECALCIFEROL TAB 10 MCG (400 UNIT) 400 UNITS: 10 TAB at 10:04

## 2017-01-31 RX ADMIN — APIXABAN 5 MG: 2.5 TABLET, FILM COATED ORAL at 19:32

## 2017-01-31 RX ADMIN — MULTIPLE VITAMINS W/ MINERALS TAB 1 TABLET: TAB at 10:03

## 2017-01-31 RX ADMIN — MAGNESIUM SULFATE IN WATER 2 G: 40 INJECTION, SOLUTION INTRAVENOUS at 10:02

## 2017-01-31 RX ADMIN — DIPHENHYDRAMINE HYDROCHLORIDE 50 MG: 50 INJECTION INTRAMUSCULAR; INTRAVENOUS at 13:32

## 2017-01-31 RX ADMIN — POTASSIUM CHLORIDE 40 MEQ: 1500 TABLET, EXTENDED RELEASE ORAL at 19:31

## 2017-01-31 RX ADMIN — HEPARIN SODIUM 2100 UNITS/HR: 5000 INJECTION, SOLUTION INTRAVENOUS at 04:49

## 2017-01-31 RX ADMIN — ESCITALOPRAM OXALATE 10 MG: 10 TABLET ORAL at 10:04

## 2017-01-31 RX ADMIN — ALISKIREN HEMIFUMARATE 300 MG: 150 TABLET, FILM COATED ORAL at 17:59

## 2017-01-31 RX ADMIN — IOHEXOL 10 ML: 350 INJECTION, SOLUTION INTRAVENOUS at 13:36

## 2017-01-31 RX ADMIN — ACETAMINOPHEN 1000 MG: 500 TABLET, FILM COATED ORAL at 17:59

## 2017-01-31 RX ADMIN — ACETAMINOPHEN 1000 MG: 500 TABLET, FILM COATED ORAL at 23:59

## 2017-01-31 RX ADMIN — ALLOPURINOL 300 MG: 300 TABLET ORAL at 10:03

## 2017-01-31 RX ADMIN — POTASSIUM CHLORIDE 20 MEQ: 1500 TABLET, EXTENDED RELEASE ORAL at 06:19

## 2017-01-31 RX ADMIN — LIDOCAINE HYDROCHLORIDE: 20 INJECTION, SOLUTION INFILTRATION; PERINEURAL at 13:33

## 2017-01-31 RX ADMIN — LEVOTHYROXINE SODIUM 50 MCG: 50 TABLET ORAL at 06:08

## 2017-01-31 RX ADMIN — IBUPROFEN 800 MG: 800 TABLET, FILM COATED ORAL at 19:31

## 2017-01-31 RX ADMIN — CEFAZOLIN SODIUM 1 G: 1 INJECTION, SOLUTION INTRAVENOUS at 19:32

## 2017-01-31 RX ADMIN — MIDAZOLAM 1.5 MG: 1 INJECTION INTRAMUSCULAR; INTRAVENOUS at 14:14

## 2017-01-31 RX ADMIN — BUMETANIDE 1 MG: 1 TABLET ORAL at 06:08

## 2017-01-31 RX ADMIN — VITAMIN D, TAB 1000IU (100/BT) 2000 UNITS: 25 TAB at 09:00

## 2017-01-31 RX ADMIN — METOPROLOL TARTRATE 50 MG: 50 TABLET, FILM COATED ORAL at 17:59

## 2017-01-31 RX ADMIN — DEXTROSE MONOHYDRATE 500 ML: 50 INJECTION, SOLUTION INTRAVENOUS at 10:02

## 2017-01-31 RX ADMIN — DILTIAZEM HYDROCHLORIDE 240 MG: 240 CAPSULE, COATED, EXTENDED RELEASE ORAL at 10:02

## 2017-01-31 RX ADMIN — CEFAZOLIN 2000 MG: 1 INJECTION, POWDER, FOR SOLUTION INTRAVENOUS at 13:20

## 2017-01-31 RX ADMIN — Medication 390 MG: at 10:03

## 2017-01-31 RX ADMIN — MIDAZOLAM 4 MG: 1 INJECTION INTRAMUSCULAR; INTRAVENOUS at 13:42

## 2017-01-31 RX ADMIN — FENTANYL CITRATE 75 MCG: 50 INJECTION, SOLUTION INTRAMUSCULAR; INTRAVENOUS at 14:15

## 2017-01-31 RX ADMIN — FENTANYL CITRATE 100 MCG: 50 INJECTION, SOLUTION INTRAMUSCULAR; INTRAVENOUS at 13:32

## 2017-01-31 RX ADMIN — BUMETANIDE 1 MG: 1 TABLET ORAL at 16:00

## 2017-01-31 RX ADMIN — CEFAZOLIN 1000 MG: 1 INJECTION, POWDER, FOR SOLUTION INTRAVENOUS at 13:34

## 2017-01-31 RX ADMIN — METOPROLOL TARTRATE 50 MG: 50 TABLET, FILM COATED ORAL at 10:04

## 2017-01-31 RX ADMIN — CALCIUM CARBONATE 1000 MG: 1250 TABLET ORAL at 10:03

## 2017-01-31 ASSESSMENT — PAIN SCALES - GENERAL
PAINLEVEL_OUTOF10: 0

## 2017-01-31 ASSESSMENT — ENCOUNTER SYMPTOMS
CONSTITUTIONAL NEGATIVE: 1
COUGH: 0
CHILLS: 0
DIZZINESS: 0
BACK PAIN: 0
VOMITING: 0
FEVER: 0
SHORTNESS OF BREATH: 0
CARDIOVASCULAR NEGATIVE: 1
ABDOMINAL PAIN: 0
NAUSEA: 0
DIARRHEA: 0
HEADACHES: 0
LOSS OF CONSCIOUSNESS: 0
RESPIRATORY NEGATIVE: 1

## 2017-01-31 NOTE — PROGRESS NOTES
Pt to cath lab with Cath Lab RN for AICD placement, family (wife and daughter) went down with patient.

## 2017-01-31 NOTE — CARE PLAN
Problem: Communication  Goal: The ability to communicate needs accurately and effectively will improve  Outcome: PROGRESSING AS EXPECTED  Pt able to communicate all needs to staff, uses call light when appropriate.        Problem: Safety  Goal: Will remain free from injury  Outcome: PROGRESSING AS EXPECTED  Safety measures in place, call light within reach, bed in lowest position, safety education complete.

## 2017-01-31 NOTE — PROGRESS NOTES
[facility] Medicine Progress Note, Adult, Complex               Author: Yared Salinas Date & Time created: 2017  9:32 AM     Interval History:  ***    Review of Systems:  ROS    Physical Exam:  Physical Exam    Labs:        Invalid input(s): [id number]  Recent Labs      17   035   TROPONINI  0.02   < >  0.35*  0.07*  0.06*   BNPBTYPENAT  106*   --    --    --    --     < > = values in this interval not displayed.     Recent Labs      17   SODIUM  139  139  139   POTASSIUM  3.4*  4.0  3.5*   CHLORIDE  104  106  106   CO2  26  25  26   BUN  26*  23*  20   CREATININE  1.15  1.01  0.95   MAGNESIUM  2.0  2.3  1.8   CALCIUM  9.5  9.9  9.2     Recent Labs      17   ALTSGPT  53*  46  31   ASTSGOT  70*  38  20   ALKPHOSPHAT  66  57  51   TBILIRUBIN  0.8  0.8  0.8   GLUCOSE  153*  182*  126*     Recent Labs      170  17   RBC  4.16*  4.01*   --    --   4.04*   HEMOGLOBIN  11.9*  11.5*   --    --   11.5*   HEMATOCRIT  37.4*  35.8*   --    --   36.4*   PLATELETCT  107*  102*   --    --   96*   PROTHROMBTM  15.9*   --    --    --    --    APTT  28.2   --   28.7  38.2*  54.5*   INR  1.23*   --    --    --    --    IRON   --    --    --    --   84   TOTIRONBC   --    --    --    --   357     Recent Labs      17   WBC  9.1   --   10.1  9.3   NEUTSPOLYS  73.90*   --   70.60  67.90   LYMPHOCYTES  14.60*   --   14.10*  18.60*   MONOCYTES  7.40   --   11.80  8.80   EOSINOPHILS  2.80   --   2.30  3.50   BASOPHILS  0.60   --   0.50  0.60   ASTSGOT  70*  38   --   20   ALTSGPT  53*  46   --   31   ALKPHOSPHAT  66  57   --   51   TBILIRUBIN  0.8  0.8   --   0.8           Hemodynamics:  Temp (24hrs), Av.2 °C (97.1 °F), Min:35.9 °C  (96.7 °F), Max:36.3 °C (97.3 °F)  Temperature: 36.2 °C (97.1 °F)  Pulse  Av.8  Min: 53  Max: 142Heart Rate (Monitored): 76  NIBP: 132/71 mmHg     Respiratory:    Respiration: 16, Pulse Oximetry: 94 %     Work Of Breathing / Effort: Mild  RUL Breath Sounds: Clear, RML Breath Sounds: Diminished, RLL Breath Sounds: Diminished, PORSCHE Breath Sounds: Clear, LLL Breath Sounds: Diminished  Fluids:    Intake/Output Summary (Last 24 hours) at 17 0932  Last data filed at 17 0800   Gross per 24 hour   Intake 2345.4 ml   Output   4150 ml   Net -1804.6 ml     Weight: (!) 165.9 kg (365 lb 11.9 oz)  GI/Nutrition:  Orders Placed This Encounter   Procedures   • DIET ORDER     Standing Status: Standing      Number of Occurrences: 1      Standing Expiration Date:      Order Specific Question:  Diet:     Answer:  Cardiac [6]     Medical Decision Making, by Problem:  Active Hospital Problems    Diagnosis   • Syncope [R55]   • Hypokalemia [E87.6]   • Depression [F32.9]   • Hemorrhagic disorder due to extrinsic circulating anticoagulants (CMS-HCC) [D68.32]   • Ventricular tachycardia (CMS-HCC) [I47.2]       Core Measures

## 2017-01-31 NOTE — CARE PLAN
Problem: Discharge Barriers/Planning  Goal: Patient’s continuum of care needs will be met  Outcome: PROGRESSING AS EXPECTED    Problem: Fluid Volume:  Goal: Will maintain balanced intake and output  Outcome: PROGRESSING AS EXPECTED

## 2017-01-31 NOTE — CONSULTS
DATE OF SERVICE:  01/30/2017    REASON FOR CONSULTATION:  This consultation is performed at the request Dr. Byron Hood for evaluation of ventricular tachycardia, status post   TAVR.    HISTORY OF PRESENT ILLNESS:  The patient is a 66-year-old gentleman with a   history of atrial fibrillation that has been chronic since at least last fall   and severe aortic stenosis, status post TAVR 3 weeks ago, COPD, obesity, sleep   apnea, history of DVT and PE, who underwent TAVR 3 weeks ago.  He was   recovering from the TAVR at home and had an episode of syncope.  He had very   little warning before the onset and then passed out.  He remembers being down   on the ground and his wife on the phone, she thinks with EMS.  She knows from   being told that he was in VT when they arrived and he knows that he was not   shocked to get him back into rhythm.  He thinks it was just spontaneous.  He   has never had an episode like this before.  He had normal coronary arteries   and workup before his test.  He has not passed out ever.  He does have a   history of atrial fibrillation that has been persistent.  He thinks since   August he is not sure if he had before them, but whenever he is really feeling   it that is what actually led to his diagnosis of the severe AS and led to him   getting his recent TAVR.  He used to get palpitations, but not very much   anymore and has been tolerating the AFib relatively well.    PAST MEDICAL HISTORY:  Significant for TAVR on 01/09/2017, atrial   fibrillation, on Eliquis since January 9th, CHF with preserved ejection   fraction, likely due to the ____, sleep apnea on BiPAP, hypertension, obesity,   and remote DVT with PE.    FAMILY HISTORY:  Heart disease is in the family.    HOME MEDICATIONS:  Include potassium, calcium, metoprolol 50 3 times a day,   aspirin, Bumex, digoxin, diltiazem, Eliquis, Lexapro, Halcion, Synthroid,   Tekturna, allopurinol, and simvastatin.  Last Eliquis was last  night.    ALLERGIES:  No known drug allergies.    SOCIAL HISTORY:  He is a former smoker.    REVIEW OF SYSTEMS:  He is doing generally well.  His IV came up this morning,   so he was covered in blood.  Full review of systems is otherwise negative.    PHYSICAL EXAMINATION:  GENERAL:  He is well appearing.  There are some blood spots on his count from   his IV incident.  VITAL SIGNS:  He is afebrile, temperature is 98.1, pulse 76, respiratory rate   is 21, heart rate is 76, blood pressure 142/70, and he is satting 94% on 2 L.  HEENT:  Normocephalic, atraumatic.  Mucous membranes are moist.  Eyes,   extraocular movements are intact.  Pupils are equal.  NECK:  No jugular venous distention is appreciated.  HEART:  I did not appreciate any murmurs, now the TAVR has been done.  He is   irregularly irregular for the heartbeat.  LUNGS:  Clear to auscultation bilaterally.  ABDOMEN:  Obese, soft, nontender, and nondistended.  EXTREMITIES:  No clubbing, cyanosis or edema.  NEUROLOGIC:  Nonfocal.  PSYCHIATRIC:  He is alert, oriented, and appropriate.    LABORATORY DATA:  Reviewing his data, I have looked at the strips from EMS and   his wide complex tachycardia with a rate over 200 beats per minute.  It is   ____ and very consistent with monomorphic ventricular tachycardia.  His   resting ECG has atrial fibrillation.  His white blood cell count is 10.1,   hemoglobin 11.5, and platelet count 102.  His platelets appear to be   chronically in this level.  Reviewed his echocardiogram, has a normal ejection   fraction.  Coronary angiogram has no significant evidence of epicardial   coronary artery disease.    ASSESSMENT AND PLAN:  This is a 66-year-old gentleman presenting with   ventricular tachycardia arrest.  He does not have any predisposing underlying   condition other than possible LVH from his aortic stenosis.  I think ICD is   reasonable.  For now, we can continue him on his amiodarone.  I would place a   dual chamber ICD as  he may be a candidate for rhythm control, I think with 3   weeks to the day since his TAVR, it may be reasonable to perform DFT testing   and cardioversion without SCOUT, but I would defer that to the .    Originally, I was going to arrange this today, but it will be scheduled for   tomorrow with Dr. Blankenship to allow time for the Eliquis to get out of his   system.       ____________________________________     MD MICHELLE Rosa / MONIQUE    DD:  01/30/2017 19:24:07  DT:  01/30/2017 23:28:54    D#:  [id number]  Job#:  [id number]

## 2017-01-31 NOTE — CATH LAB
Immediate Post-Operative Note      PreOp Diagnosis: Sustained VT with syncope    PostOp Diagnosis: Sustained VT with syncope    Procedure(s) :  Dual Chamber AICD with DFT    Surgeon(s):  Danilo Blankenship M.D.    Type of Anesthesia: Moderate Sedation - Versed 5.5 mg; Fentanyl 175 mcg; Benadryl 50 mg    Specimen: None    Estimated Blood Loss: <10 cc's    Contrast Media:  10 cc's    Fluoro Time: 4.4 min    Findings: Successful AICD (dual chamber implant) with DFT <31 J x 2.  Restoration of sinus rhythm    Complications: None      Danilo Blankenship M.D.  1/31/2017 2:47 PM

## 2017-01-31 NOTE — PROGRESS NOTES
Echo scheduled for morning of 1/31. Pt started on Heparin per MD Mohsen for history of PE. Pt ambulates to restroom steadily. Family at bedside.

## 2017-01-31 NOTE — PROGRESS NOTES
Pt care assumed. Pt resting in bed, no c/o pain, VSS. Assessment completed. Plan of care reviewed with pt. Call light with in reach, bed in lowest position, fall precautions in place. Will continue to monitor.

## 2017-01-31 NOTE — PROGRESS NOTES
Cardiology Progress Note               Author: Danilo Blankenship MD Date & Time created: 2017  12:06 PM     Interval History:  Mr. Khalil is a pleasant 66 year old male with recent TAVR.  He represented to the ER after syncope and EMS telemetry strips showing VT.  By reports he converted to sinus on his own.  His EF is normal.  Pre-TAVR angiogram showed normal coronary arteries.  At presentation he had a slightly low potassium.  There is no identified reversible cause for his VT.    He does have persistent atrial fibrillation.  He has been on Eliquis for 3 weeks, since TAVR.    Review of Systems   Constitutional: Negative.    Respiratory: Negative.    Cardiovascular: Negative.        Physical Exam   Constitutional: He appears well-developed and well-nourished. No distress.   Skin: Skin is warm and dry. He is not diaphoretic.   Nursing note and vitals reviewed.      Hemodynamics:  Temp (24hrs), Av.1 °C (97 °F), Min:35.9 °C (96.7 °F), Max:36.2 °C (97.2 °F)  Temperature: 36.2 °C (97.1 °F)  Pulse  Av.8  Min: 53  Max: 142Heart Rate (Monitored): 76  NIBP: 132/71 mmHg     Respiratory:    Respiration: 16, Pulse Oximetry: 94 %     Work Of Breathing / Effort: Mild  RUL Breath Sounds: Clear, RML Breath Sounds: Diminished, RLL Breath Sounds: Diminished, PORSCHE Breath Sounds: Clear, LLL Breath Sounds: Diminished  Fluids:  Date 17 0700 - 17 0659   Shift 7111-8556 5473-4485 6818-5406 24 Hour Total   I  N  T  A  K  E   I.V. 137.4   137.4    Shift Total 137.4   137.4   O  U  T  P  U  T   Shift Total       Weight (kg) 165.9 165.9 165.9 165.9       Weight: (!) 165.9 kg (365 lb 11.9 oz)  GI/Nutrition:  Orders Placed This Encounter   Procedures   • DIET ORDER     Standing Status: Standing      Number of Occurrences: 1      Standing Expiration Date:      Order Specific Question:  Diet:     Answer:  Cardiac [6]     Lab Results:  Recent Labs      17   1853  17   0457  17   0352   WBC  9.1  10.1  9.3    RBC  4.16*  4.01*  4.04*   HEMOGLOBIN  11.9*  11.5*  11.5*   HEMATOCRIT  37.4*  35.8*  36.4*   MCV  89.9  89.3  90.1   MCH  28.6  28.7  28.5   MCHC  31.8*  32.1*  31.6*   RDW  56.7*  55.2*  57.2*   PLATELETCT  107*  102*  96*   MPV  10.8  11.6  11.6     Recent Labs      01/29/17 1853 01/30/17   0055  01/31/17   0352   SODIUM  139  139  139   POTASSIUM  3.4*  4.0  3.5*   CHLORIDE  104  106  106   CO2  26  25  26   GLUCOSE  153*  182*  126*   BUN  26*  23*  20   CREATININE  1.15  1.01  0.95   CALCIUM  9.5  9.9  9.2     Recent Labs      01/29/17 1853 01/30/17   1420  01/30/17 2057 01/31/17   0352   APTT  28.2  28.7  38.2*  54.5*   INR  1.23*   --    --    --      Recent Labs      01/29/17 1853   BNPBTYPENAT  106*     Recent Labs      01/29/17 1853 01/30/17   0457  01/30/17 2057 01/31/17   0352   TROPONINI  0.02   < >  0.35*  0.07*  0.06*   BNPBTYPENAT  106*   --    --    --    --     < > = values in this interval not displayed.             Medical Decision Making, by Problem:  Active Hospital Problems    Diagnosis   • Syncope [R55]   • Hypokalemia [E87.6]   • Depression [F32.9]   • Hemorrhagic disorder due to extrinsic circulating anticoagulants (CMS-HCC) [D68.32]   • Ventricular tachycardia (CMS-HCC) [I47.2]       Plan:  1.  Symptomatic VT with no reversible cause.  I have reviewed all of the hospital data.  I have discussed the data and the procedures (AICD implant and DFT) with Mr. Khalil and his family.  I reviewed the risk of stroke from DFT testing and the common risks of AICD implant.  He has reviewed and signed the consent form and I answered all of his and his family's questions.  Mr. Khalil will follow up with [facility] following the case.  Continue with all medical therapies.   - AICD for secondary prevention with documented symptomatic VT.   - Dual chamber AICD due to the potential for potential of restoring sinus rhythm and for the need for atrial pacing in the future.  I  reviewed Dr. Knapp's note and agree with this assessment.   - Resume Eliquis following the procedure for stroke risk reduction.    Core Measures

## 2017-01-31 NOTE — CARE PLAN
Problem: Safety  Goal: Will remain free from injury  Outcome: PROGRESSING AS EXPECTED  Pt educated on safety, safety precautions in place. Personal belongings and call light within reach.     Problem: Risk for Deep Vein Thrombosis/Venous Thromboembolism  Goal: DVT/VTE Prevention Measures in Place  Outcome: PROGRESSING AS EXPECTED  Pt medicated per MAR.

## 2017-01-31 NOTE — DISCHARGE PLANNING
No over night events.  Amio drip. BP ok.  No fevers.  NPO AICD today this AM.  Ambulates.  No ABX.      Follow for Dc planning.  Likely home post AICD placement.

## 2017-01-31 NOTE — PROGRESS NOTES
"Hospital Medicine Progress Note, Adult, Complex               Author: Yared Salinas Date & Time created: 1/31/2017  11:28 AM     Interval History:  65yo with Hx AFib admitted 1/29 with syncope and sustained VTach    This am states he feels \"fine\" though tired.  No dizziness, SOB, syncope/presyncope overnight    Review of Systems:  Review of Systems   Constitutional: Negative for fever and chills.   Respiratory: Negative for cough and shortness of breath.    Cardiovascular: Negative for chest pain.   Gastrointestinal: Negative for nausea, vomiting, abdominal pain and diarrhea.   Musculoskeletal: Negative for back pain.   Skin: Negative for rash.   Neurological: Negative for dizziness, loss of consciousness and headaches.       Physical Exam:  Physical Exam   Constitutional: He is oriented to person, place, and time. He appears well-developed and well-nourished. No distress.   HENT:   Head: Normocephalic and atraumatic.   Eyes: Conjunctivae are normal.   Neck: No JVD present.   Cardiovascular: Normal rate.  Exam reveals no gallop.    Murmur heard.  Pulmonary/Chest: Effort normal. No stridor. No respiratory distress. He has no wheezes. He has no rales.   Abdominal: Soft. There is no tenderness. There is no rebound and no guarding.   Musculoskeletal: He exhibits no edema.   Neurological: He is oriented to person, place, and time.   Skin: Skin is warm and dry. No rash noted. He is not diaphoretic.   Psychiatric: He has a normal mood and affect. Thought content normal.   Nursing note and vitals reviewed.      Labs:        Invalid input(s): [id number]  Recent Labs      01/29/17   1853   01/30/17   0457  01/30/17 2057 01/31/17   0352   TROPONINI  0.02   < >  0.35*  0.07*  0.06*   BNPBTYPENAT  106*   --    --    --    --     < > = values in this interval not displayed.     Recent Labs      01/29/17   1853  01/30/17   0055  01/31/17   0352   SODIUM  139  139  139   POTASSIUM  3.4*  4.0  3.5*   CHLORIDE  104  " 106  106   CO2  26  25  26   BUN  26*  23*  20   CREATININE  1.15  1.01  0.95   MAGNESIUM  2.0  2.3  1.8   CALCIUM  9.5  9.9  9.2     Recent Labs      17   ALTSGPT  53*  46  31   ASTSGOT  70*  38  20   ALKPHOSPHAT  66  57  51   TBILIRUBIN  0.8  0.8  0.8   GLUCOSE  153*  182*  126*     Recent Labs      17   RBC  4.16*  4.01*   --    --   4.04*   HEMOGLOBIN  11.9*  11.5*   --    --   11.5*   HEMATOCRIT  37.4*  35.8*   --    --   36.4*   PLATELETCT  107*  102*   --    --   96*   PROTHROMBTM  15.9*   --    --    --    --    APTT  28.2   --   28.7  38.2*  54.5*   INR  1.23*   --    --    --    --    IRON   --    --    --    --   84   TOTIRONBC   --    --    --    --   357     Recent Labs      17   WBC  9.1   --   10.1  9.3   NEUTSPOLYS  73.90*   --   70.60  67.90   LYMPHOCYTES  14.60*   --   14.10*  18.60*   MONOCYTES  7.40   --   11.80  8.80   EOSINOPHILS  2.80   --   2.30  3.50   BASOPHILS  0.60   --   0.50  0.60   ASTSGOT  70*  38   --   20   ALTSGPT  53*  46   --   31   ALKPHOSPHAT  66  57   --   51   TBILIRUBIN  0.8  0.8   --   0.8           Hemodynamics:  Temp (24hrs), Av.2 °C (97.1 °F), Min:35.9 °C (96.7 °F), Max:36.3 °C (97.3 °F)  Temperature: 36.2 °C (97.1 °F)  Pulse  Av.8  Min: 53  Max: 142Heart Rate (Monitored): 76  NIBP: 132/71 mmHg     Respiratory:    Respiration: 16, Pulse Oximetry: 94 %     Work Of Breathing / Effort: Mild  RUL Breath Sounds: Clear, RML Breath Sounds: Diminished, RLL Breath Sounds: Diminished, PORSCHE Breath Sounds: Clear, LLL Breath Sounds: Diminished  Fluids:    Intake/Output Summary (Last 24 hours) at 17 1128  Last data filed at 17 0800   Gross per 24 hour   Intake 2251.4 ml   Output   3350 ml   Net -1098.6 ml     Weight: (!) 165.9 kg (365 lb 11.9 oz)  GI/Nutrition:  Orders Placed  This Encounter   Procedures   • DIET ORDER     Standing Status: Standing      Number of Occurrences: 1      Standing Expiration Date:      Order Specific Question:  Diet:     Answer:  Cardiac [6]     Medical Decision Making, by Problem:  Active Hospital Problems    Diagnosis   • Syncope [R55]  Sustained VTach  Plan AICD later today  Card's following  On Amio gtts  No further events   • Hypokalemia [E87.6]  Replace and follow   • Depression [F32.9]   • Hemorrhagic disorder due to extrinsic circulating anticoagulants (CMS-HCC) [D68.32]  Presently off apixaban for AICD placement  On heparin gtts  For AFib   • Ventricular tachycardia (CMS-HCC) [I47.2]   -hypothyroid: cont replacement.  TSH wnl  -s/p TAVR: repeat echo pending    Dw ICU staff    Dw pt and family at the bedside    Medications reviewed, EKG reviewed, Labs reviewed and Radiology images reviewed  Rehman catheter: No Rehman      DVT Prophylaxis: Heparin  DVT prophylaxis - mechanical: SCDs  Ulcer prophylaxis: Not indicated

## 2017-02-01 VITALS
HEART RATE: 68 BPM | TEMPERATURE: 98.1 F | RESPIRATION RATE: 26 BRPM | BODY MASS INDEX: 39.17 KG/M2 | WEIGHT: 315 LBS | OXYGEN SATURATION: 85 % | HEIGHT: 75 IN

## 2017-02-01 LAB — EKG IMPRESSION: NORMAL

## 2017-02-01 PROCEDURE — 99239 HOSP IP/OBS DSCHRG MGMT >30: CPT | Performed by: HOSPITALIST

## 2017-02-01 PROCEDURE — 94660 CPAP INITIATION&MGMT: CPT

## 2017-02-01 PROCEDURE — A9270 NON-COVERED ITEM OR SERVICE: HCPCS | Performed by: INTERNAL MEDICINE

## 2017-02-01 PROCEDURE — 700112 HCHG RX REV CODE 229: Performed by: INTERNAL MEDICINE

## 2017-02-01 PROCEDURE — 700102 HCHG RX REV CODE 250 W/ 637 OVERRIDE(OP): Performed by: INTERNAL MEDICINE

## 2017-02-01 PROCEDURE — 700102 HCHG RX REV CODE 250 W/ 637 OVERRIDE(OP): Performed by: HOSPITALIST

## 2017-02-01 PROCEDURE — 700111 HCHG RX REV CODE 636 W/ 250 OVERRIDE (IP): Performed by: INTERNAL MEDICINE

## 2017-02-01 PROCEDURE — A9270 NON-COVERED ITEM OR SERVICE: HCPCS | Performed by: HOSPITALIST

## 2017-02-01 PROCEDURE — 93010 ELECTROCARDIOGRAM REPORT: CPT | Performed by: INTERNAL MEDICINE

## 2017-02-01 PROCEDURE — 93005 ELECTROCARDIOGRAM TRACING: CPT | Performed by: INTERNAL MEDICINE

## 2017-02-01 RX ORDER — AMIODARONE HYDROCHLORIDE 400 MG/1
400 TABLET ORAL 2 TIMES DAILY
Qty: 30 TAB | Refills: 0 | Status: CANCELLED | OUTPATIENT
Start: 2017-02-01

## 2017-02-01 RX ORDER — DOCUSATE SODIUM 100 MG/1
100 CAPSULE, LIQUID FILLED ORAL DAILY
Status: DISCONTINUED | OUTPATIENT
Start: 2017-02-01 | End: 2017-02-01 | Stop reason: HOSPADM

## 2017-02-01 RX ORDER — AMIODARONE HYDROCHLORIDE 400 MG/1
TABLET ORAL
Qty: 40 TAB | Refills: 0 | Status: SHIPPED | OUTPATIENT
Start: 2017-02-01 | End: 2020-10-23

## 2017-02-01 RX ADMIN — ALLOPURINOL 300 MG: 300 TABLET ORAL at 08:22

## 2017-02-01 RX ADMIN — ESCITALOPRAM OXALATE 10 MG: 10 TABLET ORAL at 08:23

## 2017-02-01 RX ADMIN — CALCIUM CARBONATE 1000 MG: 1250 TABLET ORAL at 06:27

## 2017-02-01 RX ADMIN — METOPROLOL TARTRATE 50 MG: 50 TABLET, FILM COATED ORAL at 06:27

## 2017-02-01 RX ADMIN — DOCUSATE SODIUM 100 MG: 100 CAPSULE ORAL at 08:22

## 2017-02-01 RX ADMIN — VITAMIN D, TAB 1000IU (100/BT) 2000 UNITS: 25 TAB at 08:23

## 2017-02-01 RX ADMIN — LEVOTHYROXINE SODIUM 50 MCG: 50 TABLET ORAL at 06:27

## 2017-02-01 RX ADMIN — CEFAZOLIN SODIUM 1 G: 1 INJECTION, SOLUTION INTRAVENOUS at 06:27

## 2017-02-01 RX ADMIN — METOPROLOL TARTRATE 50 MG: 50 TABLET, FILM COATED ORAL at 12:08

## 2017-02-01 RX ADMIN — IBUPROFEN 800 MG: 800 TABLET, FILM COATED ORAL at 12:08

## 2017-02-01 RX ADMIN — ACETAMINOPHEN 1000 MG: 500 TABLET, FILM COATED ORAL at 06:27

## 2017-02-01 RX ADMIN — APIXABAN 5 MG: 2.5 TABLET, FILM COATED ORAL at 08:22

## 2017-02-01 RX ADMIN — Medication 390 MG: at 08:23

## 2017-02-01 RX ADMIN — IBUPROFEN 800 MG: 800 TABLET, FILM COATED ORAL at 06:27

## 2017-02-01 RX ADMIN — BUMETANIDE 1 MG: 1 TABLET ORAL at 06:28

## 2017-02-01 RX ADMIN — ACETAMINOPHEN 1000 MG: 500 TABLET, FILM COATED ORAL at 12:08

## 2017-02-01 RX ADMIN — POTASSIUM CHLORIDE 40 MEQ: 1500 TABLET, EXTENDED RELEASE ORAL at 08:23

## 2017-02-01 RX ADMIN — AMIODARONE HYDROCHLORIDE 400 MG: 200 TABLET ORAL at 08:22

## 2017-02-01 RX ADMIN — MULTIPLE VITAMINS W/ MINERALS TAB 1 TABLET: TAB at 08:23

## 2017-02-01 RX ADMIN — CHOLECALCIFEROL TAB 10 MCG (400 UNIT) 400 UNITS: 10 TAB at 06:28

## 2017-02-01 RX ADMIN — ASPIRIN 81 MG: 81 TABLET ORAL at 08:22

## 2017-02-01 ASSESSMENT — PAIN SCALES - GENERAL: PAINLEVEL_OUTOF10: 1

## 2017-02-01 ASSESSMENT — LIFESTYLE VARIABLES: EVER_SMOKED: YES

## 2017-02-01 NOTE — OP REPORT
DATE OF SERVICE:  01/31/2017    REQUESTING PHYSICIAN:  Dr. Mckenzie.    REFERRAL INDICATION:  This is a symptomatic nonreversible ventricular   tachycardia with syncope.    POSTPROCEDURE DIAGNOSIS:  This is a symptomatic nonreversible ventricular   tachycardia with syncope.    PROCEDURE PLANNED AND PERFORMED:  1.  Implantation of dual chamber automatic implantable cardioverter   defibrillator.  2.  Defibrillation threshold testing.    FLUOROSCOPY TIME:  4.4 minutes.    MEDICATIONS AT THE TIME OF STUDY:  Ancef 2 g, Versed 5.5 mg, fentanyl 175 mcg,   Benadryl 50 mg.    ESTIMATED BLOOD LOSS:  Less than 10 mL    CONTRAST:  10 mL    COMPLICATIONS:  None.    CLINICAL HISTORY:  The patient is a pleasant 66-year-old gentleman with   history of persistent atrial fibrillation and recent TAVR implantation of his   aortic valve.  He was at home when he developed a syncopal episode, and when   EMS arrived, he had a monomorphic ventricular tachycardia.  He has no   identifiable reversible cause.  He has been referred for AICD implantation for   secondary prevention.  There is a likely potential that he will have   restoration of sinus rhythm and the need for medications; therefore, a dual   chamber device is being used with the potential for need for AV sequential   pacing in the future.  Because he did have out-of-hospital arrest with   symptomatic ventricular tachycardia, I have recommended defibrillation   threshold testing with the patient.  Patient had been on anticoagulation prior   to the procedure and will be initiated following the procedure.    RISKS AND BENEFITS:  Risks, benefits, and alternatives to implantation of a   dual chamber AICD and defibrillation threshold testing including, but not   limited to bleeding, infection, vascular injury, stroke, cardiac perforation,   pneumothorax, lead dislodgement, the need for urgent cardiovascular surgery   and death were discussed.  Patient agreed and signed the consent.  I did    discuss this with the patient and the patient's family.    PROCEDURE:  The patient was brought to the cardiac catheterization laboratory   in a fasting state.  Region of the left deltopectoral groove was prepped and   draped in the usual sterile manner.  Lidocaine 1% (45 mL) was used throughout   this procedure.  After achieving appropriate anesthesia, contrast was   administered via peripheral IV, and the subclavian vein, axillary vein   junction was identified.  Needle access was then performed, and a standard   pocket was then fashioned.  Using a retained wire technique, 7-[place] and   9-[place] side arm sheaths were advanced into the vein.  Active defibrillation   lead was advanced and first placed in a high mid septal position during the   case.  During defibrillation threshold testing on the first occasion, the lead   did become dislodged.  I then positioned the lead in a more apical distal   location.  The second lead was positioned in the right atrial appendage.    Adequate pacing and sensing function were established in both leads.  A 10   volt pacing was used temporarily, and there was no diaphragmatic stimulation.    Suture sleeves were connected securely to the tissue.  The suture sleeves   were connected to the tissue securely.  The pocket was washed with antibiotic   impregnated saline.  Pulse generator was connected to the leads.  Leads were   wrapped behind the pulse generator and entire system was placed in the pocket.    Pocket was eventually closed with 2-0, 3-0, and 4-0 Vicryl using a running   mattress stitch.  Sponge and needle counts were correct at the end of the   procedure.    Defibrillation threshold testing.  DFT testing was performed with shock on T   and with 50 Hz manual burst to induce ventricular tachycardia.  On occasion,   the patient would terminate spontaneously.  The first DFT testing was with 14   joules, charge time was 0.4 seconds.  This failed to convert the patient to    sinus rhythm.  He was rescued with a 31 joule shock after 5.7 seconds of   charge time.  After this shock, the AICD lead did become dislodged, and after   replacement in a separate more distal apical location, DFT assessment again   ensued.  Using 50 Hz manual burst, patient was induced into ventricular   tachycardia and energy delivered was 14 joules after a charge time of 0.3   seconds.  Patient failed to convert and was then shocked with 31 joules.  The   patient failed to convert on that occasion and was shocked externally.  After   a greater than 5-minute waiting time and change in polarity, the patient was   again induced into ventricular tachycardia, this time with a shock on T.  The   energy delivered was 31 joules, and he did convert after a charge time of 5.9   seconds.  This was successful.    Pacing thresholds.  Atrial lead initially was in atrial fibrillation, so no   threshold was obtained, but he did have P waves of 0.8 millivolts and an   impedance of 322 ohms.  Right ventricular R waves measured 9.0 millivolts,   pacing threshold was 0.9 volts at 0.4 milliseconds, lead impedance of 550   ohms.  Device settings DDDR .  VT1 zone at 160 beats per minute, VT2   zone at 180 beats per minute, VF zone at 200 beats per minute.    DEVICE DATA:  1.  Pulse generator,  [employer], model D142, serial   #[id number].  2.  Right atrial lead,  [employer], model #4470, serial #[id number].  3.  Right ventricular defibrillation lead,  [employer],   model #0296, serial #[id number].    CONCLUSIONS:  1.  Successful implantation of dual chamber AICD with adequate pacing and   sensing function.  2.  Defibrillation threshold testing x2 less than or equal to 31 joules.    PLAN:  1.  Patient will be monitored overnight.  Provided no complications, could be   dismissed in the morning.  2.  Chest x-ray to rule out pneumothorax, verify lead position.  3.  Complete course of intravenous  antibiotics.  4.  Device interrogation in the morning.  5.  Wound check in 7-10 days.  6.  Standard device and wound care have been discussed with the patient and   the patient's family.  7.  Resume Eliquis in approximately 6 hours.  8.  Change amiodarone 400 mg twice daily.  9.  Discontinue Cardizem and digoxin.  10.  Continue all other therapies.       ____________________________________     DON CAN MD CB / NTS    DD:  01/31/2017 14:58:06  DT:  01/31/2017 19:12:58    D#:  [id number]  Job#:  [id number]    cc: CHARIS SULLIVAN MD

## 2017-02-01 NOTE — PROGRESS NOTES
"Cardiology Progress Note:    Amr M Mohsen  Date & Time note created:    1/31/2017   4:34 PM       Patient ID:   Name:             Praveen Khalil   YOB: 1950  Age:                 66 y.o.  male   MRN:               [id number]                                                               Interval History:    The patient was seen and examined. The chart and telemetry were reviewed. The patient feels good with no complaints     Review of Systems:      Constitutional: Denies fevers, Denies weight changes  Eyes: Denies changes in vision, no eye pain  Ears/Nose/Throat/Mouth: Denies nasal congestion or sore throat    Cardiovascular:  chest pain,  palpitations    Respiratory:  shortness of breath , Denies cough  Gastrointestinal/Hepatic: Denies abdominal pain, nausea, vomiting, diarrhea, constipation or GI bleeding    Genitourinary: Denies dysuria or frequency  Musculoskeletal/Rheum: Denies  joint pain and swelling,    Skin: Denies rash  Neurological: Denies headache, confusion, memory loss or focal weakness/parasthesias  Psychiatric: denies mood disorder    Endocrine: Ella thyroid problems  Heme/Oncology/Lymph Nodes: Denies enlarged lymph nodes, denies brusing or known bleeding disorder  All other systems were reviewed and are negative (AMA/CMS criteria)                    Past Medical History:   Past Medical History   Diagnosis Date   • Pulmonary embolism (CMS-[facility])    • Hypertension    • Kidney stone    • Aortic stenosis      TAVR 1/9/17   • Anesthesia      \"Apnea at times with deep sedation\"    • Arthritis      osteo- hands   • Congestive heart failure (CMS-HCC)    • Sleep apnea      BiPAP with oxygen 2.5 L   • Cataract      bilat IOL    • Psychiatric problem      depression, anxiety    • Gout    • Atrial fibrillation (CMS-HCC)      A Fib    • Disorder of thyroid      Active Hospital Problems    Diagnosis   • Syncope [R55]   • Hypokalemia [E87.6]   • Depression [F32.9]   • Hemorrhagic disorder " due to extrinsic circulating anticoagulants (CMS-HCC) [D68.32]   • Ventricular tachycardia (CMS-HCC) [I47.2]       Past Surgical History:  Past Surgical History   Procedure Laterality Date   • Lumbar laminectomy diskectomy  2007   • Tonsillectomy     • Hip arthroplasty total  10/22/2013     Performed by Nader Barr M.D. at SURGERY [facility]   • Cataract extraction with iol     • Dupuytren contracture release Right 2014   • Transcatheter aortic valve replacement  1/9/2017     Procedure: TRANSCATHETER AORTIC VALVE REPLACEMENT WITH SCOUT;  Surgeon: Sidney Laguna M.D.;  Location: SURGERY [facility];  Service:        Hospital Medications:    Current facility-administered medications:   •  potassium chloride SA (Kdur) tablet 40 mEq, 40 mEq, Oral, BID, Yared Salinas D.JONELLE, 40 mEq at 01/31/17 1003  •  acetaminophen (TYLENOL) tablet 1,000 mg, 1,000 mg, Oral, Q6HRS, Danilo Blankenship M.D.  •  ibuprofen (MOTRIN) tablet 800 mg, 800 mg, Oral, TID WITH MEALS, Danilo Blankenship M.D.  •  ondansetron (ZOFRAN) syringe/vial injection 4 mg, 4 mg, Intravenous, Q6HRS PRN, Danilo Blankenship M.D.  •  ceFAZolin (ANCEF) IVPB premix 1 g, 1 g, Intravenous, Q8HRS, Danilo Blankenship M.D.  •  amiodarone (CORDARONE) tablet 400 mg, 400 mg, Oral, TWICE DAILY, Danilo Blankenship M.D.  •  apixaban (ELIQUIS) 2.5mg tablet 5 mg, 5 mg, Oral, BID, Danilo Blankenship M.D.  •  triazolam (HALCION) tablet 0.125 mg, 0.125 mg, Oral, HS PRN, Boom Houston M.D., 0.125 mg at 01/30/17 2114  •  simvastatin (ZOCOR) tablet 40 mg, 40 mg, Oral, Nightly, Boom Houston M.D., 40 mg at 01/30/17 2051  •  therapeutic multivitamin-minerals (THERAGRAN-M) tablet 1 Tab, 1 Tab, Oral, DAILY, Boom Houston M.D., 1 Tab at 01/31/17 1003  •  metoprolol (LOPRESSOR) tablet 50 mg, 50 mg, Oral, TID WITH MEALS, Boom Houston M.D., Stopped at 01/31/17 1128  •  levothyroxine (SYNTHROID) tablet 50 mcg, 50 mcg, Oral, AM ES, Boom Houston M.D., 50 mcg at 01/31/17 0608  •  escitalopram (LEXAPRO) tablet 10 mg,  10 mg, Oral, DAILY, Boom Houston M.D., 10 mg at 01/31/17 1004  •  vitamin D (cholecalciferol) tablet 2,000 Units, 2,000 Units, Oral, QAM, Boom Houston M.D., 2,000 Units at 01/31/17 0900  •  bumetanide (BUMEX) tablet 1 mg, 1 mg, Oral, BID DIURETIC, Boom Houston M.D., 1 mg at 01/31/17 0608  •  aspirin EC (ECOTRIN) tablet 81 mg, 81 mg, Oral, BARNEY, Boom Houston M.D., 81 mg at 01/31/17 1004  •  allopurinol (ZYLOPRIM) tablet 300 mg, 300 mg, Oral, BARNEY, Boom Houston M.D., 300 mg at 01/31/17 1003  •  Respiratory Care per Protocol, , Nebulization, Continuous RT, Boom Houston M.D.  •  ondansetron (ZOFRAN) syringe/vial injection 4 mg, 4 mg, Intravenous, Q4HRS PRN, Boom Houston M.D.  •  ondansetron (ZOFRAN ODT) dispertab 4 mg, 4 mg, Oral, Q4HRS PRN, Boom Houston M.D.  •  acetaminophen (TYLENOL) tablet 650 mg, 650 mg, Oral, Q6HRS PRN, Boom Houston M.D.  •  coenzyme Q-10 capsule 390 mg, 390 mg, Oral, DAILY, Boom Houston M.D., 390 mg at 01/31/17 1003  •  aliskiren (TEKTURNA) tablet 300 mg, 300 mg, Oral, Q DAY, Boom Houston M.D., 300 mg at 01/30/17 1738  •  calcium carbonate (OS-DAVID 500) tablet 1,000 mg, 1,000 mg, Oral, QDAY with Breakfast, 1,000 mg at 01/31/17 1003 **AND** cholecalciferol (VITAMIN D3) tablet 400 Units, 400 Units, Oral, QDAY with Breakfast, Boom Houston M.D., 400 Units at 01/31/17 1004    Outpatient Medications:  Prescriptions prior to admission   Medication Sig Dispense Refill Last Dose   • potassium chloride SA (KDUR) 20 MEQ Tab CR Take 20 mEq by mouth 2 times a day.   1/29/2017 at am   • calcium carbonate-vitamin D (OSCAL 500/200 D-3) 500-200 MG-UNIT Tab Take 2 Tabs by mouth every morning with breakfast.   1/29/2017 at am   • metoprolol (LOPRESSOR) 50 MG Tab Take 50 mg by mouth 3 times a day, with meals.   1/29/2017 at 1500   • aspirin EC (ECOTRIN) 81 MG Tablet Delayed Response Take 81 mg by mouth every morning.   1/29/2017 at am   • Cholecalciferol (VITAMIN D) 2000 UNIT Tab Take 2,000 Units by mouth every  morning.   1/29/2017 at am   • Coenzyme Q10 (CO Q-10 MAXIMUM STRENGTH) 400 MG Cap Take 1 Cap by mouth every morning.   1/29/2017 at am   • bumetanide (BUMEX) 1 MG Tab Take 1 mg by mouth 2 times a day.   1/29/2017 at 1500   • digoxin (LANOXIN) 250 MCG Tab Take 250 mcg by mouth every morning.   1/29/2017 at am   • diltiazem (DILACOR XR) 240 MG XR capsule Take 240 mg by mouth every morning.   1/29/2017 at am   • apixaban (ELIQUIS) 5mg Tab Take 5 mg by mouth 2 Times a Day.   1/29/2017 at am   • escitalopram (LEXAPRO) 10 MG Tab Take 10 mg by mouth every day.   1/29/2017 at am   • triazolam (HALCION) 0.125 MG tablet Take 0.125 mg by mouth at bedtime as needed. Indications: Trouble Sleeping   unknown at unknown   • levothyroxine (SYNTHROID) 50 MCG TABS Take 50 mcg by mouth Every morning on an empty stomach.   1/29/2017 at 0455   • aliskiren (TEKTURNA) 300 MG tablet Take 300 mg by mouth every day.   1/28/2017 at 1830   • allopurinol (ZYLOPRIM) 300 MG TABS Take 300 mg by mouth every morning.   1/29/2017 at am   • simvastatin (ZOCOR) 40 MG TABS Take 40 mg by mouth every evening.   1/28/2017 at 1830   • Multiple Vitamins-Minerals (MULTIVITAL PO) Take 1 Tab by mouth every day.   1/29/2017 at am   • GLUCOSAMINE MSM COMPLEX PO Take 1 Tab by mouth every day.   1/29/2017 at am       Medication Allergy:  Allergies   Allergen Reactions   • Nkda [No Known Drug Allergy]        Family History:  Family History   Problem Relation Age of Onset   • No Known Problems Mother    • No Known Problems Father        Social History:  Social History     Social History   • Marital Status:      Spouse Name: N/A   • Number of Children: N/A   • Years of Education: N/A     Occupational History   • Not on file.     Social History Main Topics   • Smoking status: Former Smoker -- 0.75 packs/day for 40 years     Types: Cigarettes, Pipe     Quit date: 03/01/2007   • Smokeless tobacco: Never Used      Comment: quit in march 2007   • Alcohol Use: No   •  "Drug Use: No   • Sexual Activity: Not on file     Other Topics Concern   • Not on file     Social History Narrative         Physical Exam:  Vitals/ General Appearance:   Weight/BMI: Body mass index is 45.71 kg/(m^2).  Pulse 83, temperature 36.2 °C (97.2 °F), resp. rate 15, height 1.905 m (6' 3\"), weight 165.9 kg (365 lb 11.9 oz), SpO2 96 %.  Filed Vitals:    01/31/17 0900 01/31/17 1000 01/31/17 1100 01/31/17 1200   Pulse: 77 72 83    Temp:    36.2 °C (97.2 °F)   Resp: 24 20 17 15   Height:       Weight:       SpO2: 96% 95% 96%      Oxygen Therapy:  Pulse Oximetry: 96 %, O2 (LPM): 2, O2 Delivery: Silicone Nasal Cannula    Constitutional:   Well developed, Well nourished, No acute distress  HENMT:  Normocephalic, Atraumatic, Oropharynx moist mucous membranes, No oral exudates, Nose normal.  No thyromegaly.  Eyes:  EOMI, Conjunctiva normal, No discharge.  Neck:  Normal range of motion, No cervical tenderness, no carotid bruits, no JVD.  Cardiovascular:  Normal heart rate, Normal rhythm, III/VI systolic murmur over the apex, No rubs, No gallops.   Extremitites with intact distal pulses, 1+ edema.  Lungs:  Decreased breath sounds, no crackles  Abdomen: Bowel sounds normal, Soft, No tenderness, No guarding, No rebound, No masses, No hepatosplenomegaly.  Skin: Warm, Dry, No erythema, No rash, no induration.  Neurologic: Alert & oriented x 3, No focal deficits noted, cranial nerves II through X are grossly intact.  Psychiatric: Affect normal, Judgment normal, Mood normal.      MDM (Data Review):     Records reviewed and summarized in current documentation    Lab Data Review:  Recent Labs      01/29/17   1853  01/30/17   0457  01/31/17   0352   WBC  9.1  10.1  9.3   RBC  4.16*  4.01*  4.04*   HEMOGLOBIN  11.9*  11.5*  11.5*   HEMATOCRIT  37.4*  35.8*  36.4*   MCV  89.9  89.3  90.1   MCH  28.6  28.7  28.5   MCHC  31.8*  32.1*  31.6*   RDW  56.7*  55.2*  57.2*   PLATELETCT  107*  102*  96*   MPV  10.8  11.6  11.6     Recent " Labs      01/29/17 1853 01/30/17 0055 01/31/17   0352   SODIUM  139  139  139   POTASSIUM  3.4*  4.0  3.5*   CHLORIDE  104  106  106   CO2  26  25  26   GLUCOSE  153*  182*  126*   BUN  26*  23*  20   CREATININE  1.15  1.01  0.95   CALCIUM  9.5  9.9  9.2     Recent Labs      01/29/17 1853 01/30/17   1420 01/30/17 2057 01/31/17   0352   APTT  28.2  28.7  38.2*  54.5*   INR  1.23*   --    --    --      Recent Labs      01/29/17 1853   BNPBTYPENAT  106*     Recent Labs      01/29/17 1853 01/30/17 0457 01/30/17 2057 01/31/17 0352   TROPONINI  0.02   < >  0.35*  0.07*  0.06*   BNPBTYPENAT  106*   --    --    --    --     < > = values in this interval not displayed.         Recent Labs      01/29/17 1853 01/30/17 0055 01/31/17   0352   SODIUM  139  139  139   POTASSIUM  3.4*  4.0  3.5*   CHLORIDE  104  106  106   CO2  26  25  26   GLUCOSE  153*  182*  126*   BUN  26*  23*  20     Recent Labs      01/29/17 1853 01/30/17 0055 01/31/17   0352   SODIUM  139  139  139   POTASSIUM  3.4*  4.0  3.5*   CHLORIDE  104  106  106   CO2  26  25  26   BUN  26*  23*  20   CREATININE  1.15  1.01  0.95   MAGNESIUM  2.0  2.3  1.8   CALCIUM  9.5  9.9  9.2     Recent Labs      01/29/17 1853 01/30/17 1420 01/30/17 2057 01/31/17   0352   APTT  28.2  28.7  38.2*  54.5*   INR  1.23*   --    --    --      Results for orders placed or performed during the hospital encounter of 01/29/17   ECHOCARDIOGRAM COMP W/O CONT   Result Value Ref Range    Eject.Frac. MOD BP 65.18     Eject.Frac. MOD 4C 62.39     Eject.Frac. MOD 2C 70.47     Left Ventrical Ejection Fraction 65          Imaging/Procedures Review:    Echocardiogram  Moderate concentric left ventricular hypertrophy.   Normal left ventricular systolic function. Left ventricular ejection   fraction is visually estimated to be 65%.  Mildly dilated right ventricle.  Enlarged right atrium.   Mildly dilated left atrium  Moderate eccentric mitral  regurgitation. ERO by PISA method is 0.24 sq   cm.  Known TAVR aortic valve. Well seated with no paraalvular leak.   Vmax   2.5 m/sec, MG 14 mmHg, PG 31 mmHg.  Moderate tricuspid regurgitation.   Estimated right ventricular systolic pressure is 45 mmHg. Mild to   moderate pulmonary hypertension.        MDM (Assessment and Plan):     Active Hospital Problems    Diagnosis   • Syncope [R55]   • Hypokalemia [E87.6]   • Depression [F32.9]   • Hemorrhagic disorder due to extrinsic circulating anticoagulants (CMS-HCC) [D68.32]   • Ventricular tachycardia (CMS-HCC) [I47.2]         At this point, Bert is a very pleasant 66 year old man, patient of Dr. Ureña With hypertension, hyperlipidemia, COPD, morbid obesity, obstructive sleep apnea on BiPAP, history of DVT and PE, A. fib on chronic anticoagulation with Eliquis, hypo-thyroidism, severe aortic stenosis with TAVR approximately 3 weeks ago who presented to the emergency department with an episode of syncope and was found to be in sustained Vtach.  He had a short prodrome preceding the syncope, and after he regained conciousness he was found to have a bowel movement in the form of diarrhea and his wife also noted seeing some muscle twitches  He was started on amiodarone drip  At this point he is asymptomatic and is in his baseline rhythm of A. fib      He had a coronary angiogram showing mild CAD prior to the TAVR procedure  Digoxin Levels not elevated  TSH unremarkable  K and Mag unremarkable  No obvious sings of active infection  Trop 0.48 and 03.5          Dual Chamber ICD placed  Resume Eliquis  DC Digoxin  DC Calcium channel Blocker  Po Amiodarone    Ensure potassium is above 4 and magnesium is above 2.           Thank you for giving me the opportunity to participate in the patient’s care. If I can be of any further assistance, please feel free to call me.    Sincerely,  Amr Mohsen, MD, FACC, VI  Interventional Cardiology    This dictation was prepared using Dragon  Medical voice recognition software. As a result, errors may occur. When identified, these errors have been corrected. While every attempt is made to correct errors during dictation, errors may still exist.

## 2017-02-01 NOTE — PROGRESS NOTES
Pt discharged in wheelchair with RN and pt wife. Discharge instructions, prescriptions, and education completed. Pt used restroom before d'c. No other questions.

## 2017-02-01 NOTE — CARE PLAN
Problem: Knowledge Deficit  Goal: Knowledge of disease process/condition, treatment plan, diagnostic tests, and medications will improve  Outcome: PROGRESSING AS EXPECTED  Pt and family educated on POC, disease process, and medications. All questions answered.    Problem: Oxygenation/Respiratory Function  Goal: Patient will Achieve/Maintain Optimum Respiratory Rate/Effort  Outcome: PROGRESSING AS EXPECTED  Work of breathing assessed. O2 sat monitored. Oxygen titrated. Home oxygen device in use for sleeping.

## 2017-02-01 NOTE — DISCHARGE INSTRUCTIONS
Discharge Instructions    Discharged to home by car with relative. Discharged via wheelchair, hospital escort: Refused.  Special equipment needed: Not Applicable    Be sure to schedule a follow-up appointment with your primary care doctor or any specialists as instructed.     Discharge Plan:   Diet Plan: Discussed  Activity Level: Discussed  Confirmed Follow up Appointment: Patient to Call and Schedule Appointment  Confirmed Symptoms Management: Discussed  Medication Reconciliation Updated: Yes  Influenza Vaccine Indication: Not indicated: Previously immunized this influenza season and > 8 years of age    I understand that a diet low in cholesterol, fat, and sodium is recommended for good health. Unless I have been given specific instructions below for another diet, I accept this instruction as my diet prescription.   Other diet: Cardiac, low sodium     Special Instructions: None    · Is patient discharged on Warfarin / Coumadin?   No     · Is patient Post Blood Transfusion?  No    Cardioverter Defibrillator Implantation  An implantable cardioverter defibrillator (ICD) is a small, lightweight, battery-powered device that is placed (implanted) under the skin in the chest or abdomen. Your caregiver may prescribe an ICD if:  · You have had an irregular heart rhythm (arrhythmia) that originated in the lower chambers of the heart (ventricles).  · Your heart has been damaged by a disease (such as coronary artery disease) or heart condition (such as a heart attack).  An ICD consists of a battery that lasts several years, a small computer called a pulse generator, and wires called leads that go into the heart. It is used to detect and correct two dangerous arrhythmias: a rapid heart rhythm (tachycardia) and an arrhythmia in which the ventricles contract in an uncoordinated way (fibrillation). When an ICD detects tachycardia, it sends an electrical signal to the heart that restores the heartbeat to normal (cardioversion).  This signal is usually painless. If cardioversion does not work or if the ICD detects fibrillation, it delivers a small electrical shock to the heart (defibrillation) to restart the heart. The shock may feel like a strong jolt in the chest. ICDs may be programmed to correct other problems. Sometimes, ICDs are programmed to act as another type of implantable device called a pacemaker. Pacemakers are used to treat a slow heartbeat (bradycardia).  LET YOUR CAREGIVER KNOW ABOUT:  1. Any allergies you have.  2. All medicines you are taking, including vitamins, herbs, eyedrops, and over-the-counter medicines and creams.  3. Previous problems you or members of your family have had with the use of anesthetics.  4. Any blood disorders you have had.  5. Other health problems you have.  RISKS AND COMPLICATIONS  Generally, the procedure to implant an ICD is safe. However, as with any surgical procedure, complications can occur. Possible complications associated with implanting an ICD include:  · Swelling, bleeding, or bruising at the site where the ICD was implanted.  · Infection at the site where the ICD was implanted.  · A reaction to medicine used during the procedure.  · Nerve, heart, or blood vessel damage.  · Blood clots.  BEFORE THE PROCEDURE  · You may need to have blood tests, heart tests, or a chest X-ray done before the day of the procedure.  · Ask your caregiver about changing or stopping your regular medicines.  · Make plans to have someone drive you home. You may need to stay in the hospital overnight after the procedure.  · Stop smoking at least 24 hours before the procedure.  · Take a bath or shower the night before the procedure. You may need to scrub your chest or abdomen with a special type of soap.  · Do not eat or drink before your procedure for as long as directed by your caregiver. Ask if it is okay to take any needed medicine with a small sip of water.  PROCEDURE   The procedure to implant an ICD in your  chest or abdomen is usually done at a hospital in a room that has a large X-ray machine called a fluoroscope. The machine will be above you during the procedure. It will help your caregiver see your heart during the procedure. Implanting an ICD usually takes 1-3 hours. Before the procedure:   · Small monitors will be put on your body. They will be used to check your heart, blood pressure, and oxygen level.  · A needle will be put into a vein in your hand or arm. This is called an intravenous (IV) access tube. Fluids and medicine will flow directly into your body through the IV tube.  · Your chest or abdomen will be cleaned with a germ-killing (antiseptic) solution. The area may be shaved.  · You may be given medicine to help you relax (sedative).  · You will be given a medicine called a local anesthetic. This medicine will make the surgical site numb while the ICD is implanted. You will be sleepy but awake during the procedure.  After you are numb the procedure will begin. The caregiver will:  · Make a small cut (incision). This will make a pocket deep under your skin that will hold the pulse generator.  · Guide the leads through a large blood vessel into your heart and attach them to the heart muscles. Depending on the ICD, the leads may go into one ventricle or they may go to both ventricles and into an upper chamber of the heart (atrium).  · Test the ICD.  · Close the incision with stitches, glue, or staples.  AFTER THE PROCEDURE  · You may feel pain. Some pain is normal. It may last a few days.  · You may stay in a recovery area until the local anesthetic has worn off. Your blood pressure and pulse will be checked often. You will be taken to a room where your heart will be monitored.  · A chest X-ray will be taken. This is done to check that the cardioverter defibrillator is in the right place.  · You may stay in the hospital overnight.  · A slight bump may be seen over the skin where the ICD was placed.  Sometimes, it is possible to feel the ICD under the skin. This is normal.  · In the months and years afterward, your caregiver will check the device, the leads, and the battery every few months. Eventually, when the battery is low, the ICD will be replaced.     This information is not intended to replace advice given to you by your health care provider. Make sure you discuss any questions you have with your health care provider.     Document Released: 09/09/2003 Document Revised: 10/08/2014 Document Reviewed: 01/06/2014  [employer] Interactive Patient Education ©2016 [employer]      Hand Washing  Germs like bacteria, viruses, and parasites are found everywhere. They can be in the air and water, and they can be on surfaces like food, door handles, and your skin. Every day, your hands touch germs. Many of these germs can make you and your family sick. Washing your hands is one of the best ways to lower your risk of getting and sharing germs.  WHEN SHOULD I WASH MY HANDS OR USE A HAND-WASHING ALCOHOL GEL?  You should wash your hands whenever you think they are dirty. You should also wash your hands:  · Before:  1. Visiting a baby or anyone with a weakened or lowered defense (immune) system.  2. Putting in and taking out any contact lenses.  · After:  1. Working or playing outside.  2. Touching an animal or its toys or leash.  3. Handling livestock, such as cows or sheep.  4. Using the bathroom.  5. Using household  or poisonous chemicals.  6. Touching or taking out the garbage.  7. Touching anything dirty around your home.  8. Handling dirty clothes or rags.  9. Taking care of a sick child. This includes touching used tissues, toys, and clothes.  10. Sneezing, coughing, or blowing your nose.  11. Using public transportation.  12. Shaking hands.  13. Using a phone, including your mobile phone.  14. Touching money.  · Before and after:  1. Preparing food.  2. Preparing a bottle for a baby.  3. Feeding a baby or  young child.  4. Eating.  5. Visiting or taking care of someone who is sick.  6. Changing a diaper.  7. Changing a bandage (dressing).  8. Taking care of an injury or wound.  9. Giving or taking medicine.  If you are preparing food, hand-washing alcohol gels are not recommended as a replacement for hand washing.  WHAT IS THE RIGHT WAY TO WASH MY HANDS?  6. Wet your hands with clean, running water.  7. Apply liquid soap or bar soap to your hands.  8. Rub your hands together quickly to create lather.  9. Keep rubbing your hands together for at least 20 seconds. Thoroughly scrub all parts of your hands, including under your fingernails and between your fingers.  10. Rinse your hands with clean, running water. Do this until all the soap is gone.  11. Dry your hands using an air dryer or a clean paper or cloth towel, or let your hands air-dry. Do not use your clothing or a dirty towel to dry your hands.  12. If you are in a public restroom, use your towel:  1. To turn off the water faucet.  2. To open the bathroom door.     This information is not intended to replace advice given to you by your health care provider. Make sure you discuss any questions you have with your health care provider.     Document Released: 11/30/2009 Document Revised: 01/08/2016 Document Reviewed: 05/15/2015  [employer] Interactive Patient Education ©2016 [employer]    Infection Control in the Home  If you have an infection or you are taking care of someone who has an infection, it is important to know how to keep the infection from spreading. Follow these guidelines to help stop the spread of infection, and talk to your health care provider.  HOW ARE INFECTIONS SPREAD?  In order for an infection to spread, the following must be present:  · A germ. This may be a virus, bacteria, fungus, or parasite.  · A place for the germ to live. This may be:  1. On or in a person, animal, plant, or food.  2. In soil or water.  3. On surfaces, such as a door  handle.  · A susceptible host. This is a person or animal who does not have resistance (immunity) to the germ.  · A way for the germ to enter the host. This may occur by:  1. Direct contact. This may happen by making contact--such as shaking hands or hugging--with an infected person or animal. Some germs can also travel through the air and spread to you if an infected person coughs or sneezes on you or near you.  2. Indirect contact. This is when the germ enters the host through contact with an infected object. Examples include eating contaminated food, drinking contaminated water, or touching a contaminated surface with your hands and then touching your face, nose, or mouth soon after that.  HOW CAN I HELP TO PREVENT INFECTION FROM SPREADING?  There are several things that you can do to help prevent infection from spreading.  Hand Washing  It is very important to wash your hands correctly, following these steps:  13. Wet your hands with clean, running water.  14. Apply soap to your hands. Liquid soap is better than bar soap.  15. Rub your hands together quickly to create lather.  16. Keep rubbing your hands together for at least 20 seconds. Thoroughly scrub all parts of your hands, including under your fingernails and between your fingers.  17. Rinse your hands with clean, running water until all of the soap is gone.  18. Dry your hands with an air dryer or a clean paper or cloth towel, or let your hands air-dry. Do not use your clothing or a soiled towel to dry your hands.  19. If you are in a public restroom, use your towel to turn off the water faucet and to open the bathroom door.  Make sure to wash your hands:  · Before:  ¨ Visiting a baby or anyone with a weakened or lowered defense (immune) system.  ¨ Putting in and taking out any contact lenses.  · After:  ¨ Working or playing outside.  ¨ Touching an animal or its toys or leash.  ¨ Handling livestock.  ¨ Using the bathroom or helping a child or adult to use  the bathroom.  ¨ Using household  or toxic chemicals.  ¨ Touching or taking out the garbage.  ¨ Touching anything dirty around your home.  ¨ Handling soiled clothes or rags.  ¨ Taking care of a sick child. This includes touching used tissues, toys, and clothes.  ¨ Sneezing, coughing, or blowing your nose.  ¨ Using public transportation.  ¨ Shaking hands.  ¨ Using a phone, including your mobile phone.  ¨ Touching money.  · Before and after:  ¨ Preparing food.  ¨ Preparing a bottle for a baby.  ¨ Feeding a baby or a young child.  ¨ Eating.  ¨ Visiting or taking care of someone who is sick.  ¨ Changing a diaper.  ¨ Changing a bandage (dressing) or taking care of an injury or wound.  ¨ Giving or taking medicine.  Taking Care of Your Home  · Make sure that you have enough cleaning supplies at all times. These include:  ¨ Disinfectants.  ¨ Reusable cleaning cloths. Wash these after each use.  ¨ Paper towels.  ¨ Utility gloves. Replace your gloves if they are cracked or torn or if they start to peel.  · Use bleach safely. Never mix it with other cleaning products, especially those that contain ammonia. This mixture can create a dangerous gas that may be deadly.  · Take care of your cleaning supplies. Toilet brushes, mops, and sponges can breed germs. Soak them in bleach and water for 5 minutes after each use.  · Do not pour used mop water down the sink. Pour it down the toilet instead.  · Maintain proper ventilation in your home.  · If you have a pet, ensure that your pet stays clean. Do not let people with weak immune systems touch bird droppings, fish tank water, or a litter box.  ¨ If you have a cat, be sure to change the litter every day.  · In the bathroom, make sure you:  ¨ Provide liquid soap.  ¨ Change towels and washcloths frequently. Avoid sharing towels and washcloths.  ¨ Change toothbrushes often and store them separately in a clean, dry place.  ¨ Disinfect the toilet.  ¨ Clean the tub, shower, and sink  with standard cleaning products.    ¨ Mop the floor with a standard .  ¨ Do not share personal items, such as razors, toothbrushes, drinking glasses, deodorant, zhu, brushes, towels, and washcloths.    · In the kitchen, make sure you:  ¨ Store food carefully.  ¨ Refrigerate leftovers promptly in covered containers.  ¨ Throw out stale or spoiled food.  ¨ Clean the inside of your refrigerator each week.  ¨ Keep your refrigerator set at 40°F (4°C) or less, and set your freezer at 0°F (-18°C) or less.  ¨ Thaw foods in the refrigerator or microwave, not at room temperature.  ¨ Serve foods at the proper temperature. Do not eat raw meat. Make sure it is cooked to the appropriate temperature. Cook eggs until they are firm.  ¨ Wash fruits and vegetables under running water.  ¨ Use separate cutting boards, plates, and utensils for raw foods and cooked foods.  ¨ Keep work surfaces clean.  ¨ Use a clean spoon each time you sample food while cooking.  ¨ Wash your dishes in hot, soapy water. Air-dry your dishes or use a .  ¨ Do not share forks, cups, or spoons during meals.  · Wear gloves if laundry is visibly soiled.  · Change linens each week or whenever they are soiled.  · Do not shake soiled linens. Doing that may send germs into the air. Put dressings, sanitary or incontinence pads, diapers, and gloves in plastic garbage bags for disposal.     This information is not intended to replace advice given to you by your health care provider. Make sure you discuss any questions you have with your health care provider.     Document Released: 09/26/2009 Document Revised: 01/08/2016 Document Reviewed: 08/20/2015  [employer] Interactive Patient Education ©2016 [employer]    Cardiocerebral Resuscitation   Cardiocerebral Resuscitation is thought to be better than CPR for out-of-hospital cardiac arrest.  BYSTANDER CARDIOCEREBRAL RESUSCITATION INVOLVES THREE SIMPLE STEPS:  · Direct someone to call your local medical  emergency service or make the call yourself.   · Position the patient on the floor. Place the heel of one hand on the center of the chest. Place the other hand on top of the first. Lock your elbows. Perform forceful chest compressions at a rate of 100 per minute. Lift your hands slightly after each push. This will allow the chest to recoil. Take turns with a bystander until paramedics arrive.   · If an automated external defibrillator (AED) is available, attach it to the patient. Then follow the machine's voice instructions. Otherwise, keep pumping.   NOTE: Gasping is not an indication of normal breathing or recovery. Initiate and continue compressions even if patient gasps. For cases of suspected drowning, drug overdose or collapse in children, follow guideline CPR (2 mouth-to-mouth breaths for every 30 chest compressions).  Document Released: 11/17/2006 Document Revised: 03/11/2013 Document Reviewed: 08/05/2009  [employer]® Patient Information ©2013 [employer].    Depression / Suicide Risk    As you are discharged from this [facility] facility, it is important to learn how to keep safe from harming yourself.    Recognize the warning signs:  · Abrupt changes in personality, positive or negative- including increase in energy   · Giving away possessions  · Change in eating patterns- significant weight changes-  positive or negative  · Change in sleeping patterns- unable to sleep or sleeping all the time   · Unwillingness or inability to communicate  · Depression  · Unusual sadness, discouragement and loneliness  · Talk of wanting to die  · Neglect of personal appearance   · Rebelliousness- reckless behavior  · Withdrawal from people/activities they love  · Confusion- inability to concentrate     If you or a loved one observes any of these behaviors or has concerns about self-harm, here's what you can do:  · Talk about it- your feelings and reasons for harming yourself  · Remove any means that you might use to  hurt yourself (examples: pills, rope, extension cords, firearm)  · Get professional help from the community (Mental Health, Substance Abuse, psychological counseling)  · Do not be alone:Call your Safe Contact- someone whom you trust who will be there for you.  · Call your local CRISIS HOTLINE 068-9349 or 676-535-2877  · Call your local Children's Mobile Crisis Response Team [place] (623) 004-9435 or www.[employer]  · Call the toll free National Suicide Prevention Hotlines   · National Suicide Prevention Lifeline 975-448-JAST (5925)  · National Hope Line Network 800-SUICIDE (820-8548)

## 2017-02-02 LAB
LV EJECT FRACT  99904: 65
LV EJECT FRACT MOD 2C 99903: 70.47
LV EJECT FRACT MOD 4C 99902: 62.39
LV EJECT FRACT MOD BP 99901: 65.18

## 2017-02-02 NOTE — DISCHARGE SUMMARY
DISCHARGE DIAGNOSES:  1.  Sustained ventricular tachycardia.  2.  Syncope or syncopal event secondary to above.  3.  History of atrial fibrillation.  4.  Status post automatic implantable cardioverter-defibrillator implantation.  5.  Chronic anticoagulation, on Apixaban.  6.  Hypothyroidism.  7.  Recent TAVR.    CONSULTANTS:  1.  Dr. Byron Hood, cardiology.  2.  Dr. Amr Mohsen MD, cardiology.  3.  Dr. Francisco Nichols, electrophysiology.    PROCEDURES:  AICD implantation, Dr. Danilo Blankenship on 01/31/2017; implantation of   [employer] model D142, serial #[id number] with right atrial lead Guidant   model 4470, serial #[id number] and right ventricular defibrillation lead [employer] model 0296, serial #[id number].    HOSPITAL COURSE:  Briefly, this is a 66-year-old gentleman who presented for   evaluation of a syncopal event on January 29th.  He had been driving his _____   cleaning snow off his driveway, when he walked into the house; he had a   witnessed syncopal event when he fell into his recliner.  EMS was called and   when they get a rhythm strip he was noted to have a wide complex regular   tachycardia with a rate of 215, thought to be V-tach.    The patient was admitted to the CICU and cardiology was consulted.  He   subsequently went to have an AICD implantation on January 31st by Dr. Danilo Blankenship.  He was also loaded with IV amiodarone.  He had no further events   while in-house.    At this point in time, we are discharging the patient to follow up with his   cardiologist and primary care physicians.  He will be discharged with   continued amiodarone load to a total of 1 g and his Cardizem has been   discontinued.  He will be discharged to home still on his metoprolol.    DISCHARGE MEDICATIONS:  1.  Amiodarone 400 mg p.o. b.i.d. for further 9 days, then to be everyday per   cardiology recommendations.  2.  Apixaban 5 mg p.o. b.i.d.  3.  Lexapro 10 mg p.o. daily.  4.  Simvastatin 40 mg p.o. at bedtime.  5.   Tekturna 300 mg p.o. daily.  6.  Metoprolol 50 mg p.o. t.i.d.  7.  Bumex 1 mg p.o. b.i.d.  8.  Allopurinol 300 mg p.o. daily.  9.  Halcion 0.125 mg at bedtime for insomnia.  10.  Multivitamin.  11.  Calcium supplement.  12.  Synthroid 50 mcg p.o. daily.    FOLLOWUP:  As noted above.  As noted, as well the patient will be taken off   his diltiazem and his digoxin per cardiology recommendations.    DISCHARGE TIME:  40 minutes, the majority of that time was spent in reviewing   discharge instructions with the patient and his wife.  I have reviewed with   him specifically risks and benefits of amiodarone, issues related to the AICD   and movement precautions thereof, etc.  All questions answered.       ____________________________________     DO JOE Orr / MONIQUE    DD:  02/01/2017 12:01:55  DT:  02/02/2017 08:08:43    D#:  [id number]  Job#:  [id number]    cc: CHARIS SULLIVAN MD, GILLIAN WALTERS DO

## 2017-02-08 ENCOUNTER — TELEPHONE (OUTPATIENT)
Dept: SLEEP MEDICINE | Facility: MEDICAL CENTER | Age: 67
End: 2017-02-08

## 2017-02-08 DIAGNOSIS — G47.33 OSA (OBSTRUCTIVE SLEEP APNEA): ICD-10-CM

## 2017-02-08 DIAGNOSIS — Z95.2 S/P TAVR (TRANSCATHETER AORTIC VALVE REPLACEMENT): ICD-10-CM

## 2017-02-08 DIAGNOSIS — R09.02 HYPOXEMIA: ICD-10-CM

## 2017-02-10 ENCOUNTER — TELEPHONE (OUTPATIENT)
Dept: CARDIOLOGY | Facility: MEDICAL CENTER | Age: 67
End: 2017-02-10

## 2017-02-10 NOTE — TELEPHONE ENCOUNTER
Called pt no answer. LM to call back office to discuss concerns. Pt was admitted recently for VT and had an echo in the hospital.     Pt called back. Explained that he can discuss when to get his next echo when seeing JI on the 14th. Pt appreciated.

## 2017-02-10 NOTE — TELEPHONE ENCOUNTER
----- Message from Shamika Brown sent at 2/9/2017  4:24 PM PST -----  Regarding: Question about scheduling Echo  SHAHZAD/Raul    Patient received a call about scheduling an Echo. He just had one done on 1/31 and wants to find out if he needs to have another one. He can be reached at [phone].

## 2017-02-10 NOTE — TELEPHONE ENCOUNTER
"Spoke with patient - he is receiving a large bill from [employer], because Medicare has deemed the equipment as \"not medically necessary.\"  Patient received a machine about 2.5years ago.  His insurance at that time was .  Patient became Medicare eligible 9/2015.  Medicare is refusing to pay for any equipment, because it is not medically necessary.  Patient was seen by [facility] until 2009, then transferred to [facility] office until 2015 (he was not seen by that office after becoming Medicare eligible), and returned to [facility] in 4/2016.  We have since had patient repeat qualifying testing (home sleep study), corrected the scoring and interpretation for the home test, which we finally resolved in 12/2016.    "

## 2017-02-14 ENCOUNTER — OFFICE VISIT (OUTPATIENT)
Dept: CARDIOLOGY | Facility: MEDICAL CENTER | Age: 67
End: 2017-02-14
Payer: MEDICARE

## 2017-02-14 VITALS
HEART RATE: 72 BPM | HEIGHT: 75 IN | DIASTOLIC BLOOD PRESSURE: 78 MMHG | BODY MASS INDEX: 39.17 KG/M2 | WEIGHT: 315 LBS | SYSTOLIC BLOOD PRESSURE: 138 MMHG | OXYGEN SATURATION: 95 %

## 2017-02-14 DIAGNOSIS — I48.91 ATRIAL FIBRILLATION, UNSPECIFIED TYPE (HCC): ICD-10-CM

## 2017-02-14 DIAGNOSIS — Z95.2 S/P TAVR (TRANSCATHETER AORTIC VALVE REPLACEMENT): ICD-10-CM

## 2017-02-14 DIAGNOSIS — G47.33 OSA (OBSTRUCTIVE SLEEP APNEA): ICD-10-CM

## 2017-02-14 DIAGNOSIS — Z95.810 ICD (IMPLANTABLE CARDIOVERTER-DEFIBRILLATOR) IN PLACE: ICD-10-CM

## 2017-02-14 DIAGNOSIS — I50.32 CHRONIC DIASTOLIC CONGESTIVE HEART FAILURE (HCC): ICD-10-CM

## 2017-02-14 DIAGNOSIS — Z86.711 HISTORY OF PULMONARY EMBOLISM: ICD-10-CM

## 2017-02-14 DIAGNOSIS — I47.20 VENTRICULAR TACHYCARDIA (HCC): ICD-10-CM

## 2017-02-14 DIAGNOSIS — J43.8 OTHER EMPHYSEMA (HCC): ICD-10-CM

## 2017-02-14 PROCEDURE — G8432 DEP SCR NOT DOC, RNG: HCPCS | Performed by: INTERNAL MEDICINE

## 2017-02-14 PROCEDURE — 99214 OFFICE O/P EST MOD 30 MIN: CPT | Performed by: INTERNAL MEDICINE

## 2017-02-14 PROCEDURE — G8482 FLU IMMUNIZE ORDER/ADMIN: HCPCS | Performed by: INTERNAL MEDICINE

## 2017-02-14 PROCEDURE — G8419 CALC BMI OUT NRM PARAM NOF/U: HCPCS | Performed by: INTERNAL MEDICINE

## 2017-02-14 PROCEDURE — 3017F COLORECTAL CA SCREEN DOC REV: CPT | Performed by: INTERNAL MEDICINE

## 2017-02-14 PROCEDURE — 1036F TOBACCO NON-USER: CPT | Performed by: INTERNAL MEDICINE

## 2017-02-14 PROCEDURE — 4040F PNEUMOC VAC/ADMIN/RCVD: CPT | Performed by: INTERNAL MEDICINE

## 2017-02-14 PROCEDURE — 1101F PT FALLS ASSESS-DOCD LE1/YR: CPT | Mod: 8P | Performed by: INTERNAL MEDICINE

## 2017-02-14 PROCEDURE — 1111F DSCHRG MED/CURRENT MED MERGE: CPT | Performed by: INTERNAL MEDICINE

## 2017-02-14 RX ORDER — POTASSIUM CHLORIDE 20 MEQ/1
20 TABLET, EXTENDED RELEASE ORAL 2 TIMES DAILY
Qty: 180 TAB | Refills: 3 | Status: SHIPPED | OUTPATIENT
Start: 2017-02-14

## 2017-02-14 ASSESSMENT — ENCOUNTER SYMPTOMS
INSOMNIA: 0
ABDOMINAL PAIN: 0
SHORTNESS OF BREATH: 1
MYALGIAS: 0
DIZZINESS: 0
LOSS OF CONSCIOUSNESS: 0
PALPITATIONS: 0
CHILLS: 0
ORTHOPNEA: 0
PND: 0
FEVER: 0
BLURRED VISION: 0

## 2017-02-14 NOTE — MR AVS SNAPSHOT
"        Praveen Khalil   2017 9:00 AM   Office Visit   MRN: [id number]    Department:  Heart [facility]   Dept Phone:  [phone]    Description:  Male : 1950   Provider:  Sidney Laguna M.D.           Reason for Visit     Follow-Up tavr      Allergies as of 2017     Allergen Noted Reactions    Nkda [No Known Drug Allergy] 2007         You were diagnosed with     S/P TAVR (transcatheter aortic valve replacement)   [105554]       Chronic diastolic congestive heart failure (CMS-[facility])   [103259]       Ventricular tachycardia (CMS-[facility])   [023369]       ICD (implantable cardioverter-defibrillator) in place   [1066667]       Atrial fibrillation, unspecified type (CMS-[facility])   [9049695]       History of pulmonary embolism   [669173]       TAZ (obstructive sleep apnea)   [701392]       Other emphysema (CMS-HCC)   [492.8.ICD-9-CM]         Vital Signs     Blood Pressure Pulse Height Weight Body Mass Index Oxygen Saturation    138/78 mmHg 72 1.905 m (6' 3\") 147.873 kg (326 lb) 40.75 kg/m2 95%    Smoking Status                   Former Smoker           Basic Information     Date Of Birth Sex Race Ethnicity Preferred Language    1950 Male White Non- English      Problem List              ICD-10-CM Priority Class Noted - Resolved    Primary localized osteoarthrosis, pelvic region and thigh M16.10 Low  10/22/2013 - Present    History of pulmonary embolism Z86.711 Medium  10/24/2013 - Present    TAZ (obstructive sleep apnea) G47.33 Low  10/24/2013 - Present    Essential hypertension, malignant I10 Medium  10/24/2013 - Present    Gout, arthritis M10.9 Low  10/24/2013 - Present    Hypothyroidism E03.9 Low  10/24/2013 - Present    Obstructive chronic bronchitis without exacerbation (CMS-HCC) J44.9 Medium  10/24/2013 - Present    Obesity hypoventilation syndrome (CMS-HCC) E66.2 Low  10/24/2013 - Present    Neuropathy (CMS-HCC) G62.9 Low  10/24/2013 - Present    S/P hip replacement Z96.649 Low  " 10/24/2013 - Present    Dyslipidemia E78.5 Medium  10/24/2013 - Present    Chronic diastolic congestive heart failure (CMS-HCC) I50.32 Medium  12/6/2016 - Present    Atrial fibrillation (CMS-HCC) I48.91 Medium  12/6/2016 - Present    Chronic anticoagulation Z79.01 Medium  12/6/2016 - Present    DVT (deep venous thrombosis) (CMS-HCC) I82.409 Medium  12/6/2016 - Present    COPD (chronic obstructive pulmonary disease) (CMS-HCC) J44.9 Low  12/6/2016 - Present    S/P TAVR (transcatheter aortic valve replacement) Z95.2 Medium  1/10/2017 - Present    Ventricular tachycardia (CMS-HCC) I47.2   1/29/2017 - Present    Syncope R55   1/30/2017 - Present    Hypokalemia E87.6   1/30/2017 - Present    Depression F32.9   1/30/2017 - Present    Hemorrhagic disorder due to extrinsic circulating anticoagulants (CMS-HCC) D68.32   1/30/2017 - Present    ICD (implantable cardioverter-defibrillator) in place Z95.810   2/14/2017 - Present      Health Maintenance        Date Due Completion Dates    IMM DTaP/Tdap/Td Vaccine (1 - Tdap) 9/22/1969 ---    COLONOSCOPY 9/22/2000 ---    IMM ZOSTER VACCINE 9/22/2010 ---    IMM PNEUMOCOCCAL 65+ (ADULT) LOW/MEDIUM RISK SERIES (2 of 2 - PPSV23) 10/23/2018 9/23/2016, 10/23/2013            Current Immunizations     13-VALENT PCV PREVNAR 9/23/2016    Influenza Vaccine Quad Inj (Pf) 9/23/2016    Pneumococcal Vaccine (UF)Historical Data 8/1/2015    Pneumococcal polysaccharide vaccine (PPSV-23) 10/23/2013  6:29 AM      Below and/or attached are the medications your provider expects you to take. Review all of your home medications and newly ordered medications with your provider and/or pharmacist. Follow medication instructions as directed by your provider and/or pharmacist. Please keep your medication list with you and share with your provider. Update the information when medications are discontinued, doses are changed, or new medications (including over-the-counter products) are added; and carry medication  information at all times in the event of emergency situations     Allergies:  NKDA - (reactions not documented)               Medications  Valid as of: February 14, 2017 -  9:41 AM    Generic Name Brand Name Tablet Size Instructions for use    Aliskiren Fumarate (Tab) TEKTURNA 300 MG Take 300 mg by mouth every day.        Allopurinol (Tab) ZYLOPRIM 300 MG Take 300 mg by mouth every morning.        Amiodarone HCl (Tab) PACERONE 400 MG One tab by mouth twice daily until Feb 10; on that day change to one pill daily        Apixaban (Tab) ELIQUIS 5mg Take 5 mg by mouth 2 Times a Day.        Aspirin (Tablet Delayed Response) ECOTRIN 81 MG Take 81 mg by mouth every morning.        Bumetanide (Tab) BUMEX 1 MG Take 1 mg by mouth 2 times a day.        Calcium Carbonate-Vitamin D (Tab) OSCAL + D 500-200 MG-UNIT Take 2 Tabs by mouth every morning with breakfast.        Cholecalciferol (Tab) vitamin D 2000 UNIT Take 2,000 Units by mouth every morning.        Coenzyme Q10 (Cap) Coenzyme Q10 400 MG Take 1 Cap by mouth every morning.        Escitalopram Oxalate (Tab) LEXAPRO 10 MG Take 10 mg by mouth every day.        Glucos-MSM-C-Up-Ijeikh-Kkepez   Take 1 Tab by mouth every day.        Levothyroxine Sodium (Tab) SYNTHROID 50 MCG Take 50 mcg by mouth Every morning on an empty stomach.        Metoprolol Tartrate (Tab) LOPRESSOR 50 MG Take 50 mg by mouth 2 times a day.        Multiple Vitamins-Minerals   Take 1 Tab by mouth every day.        Potassium Chloride Kirsten CR (Tab CR) Kdur 20 MEQ Take 1 Tab by mouth 2 times a day.        Simvastatin (Tab) ZOCOR 40 MG Take 40 mg by mouth every evening.        Triazolam (Tab) HALCION 0.125 MG Take 0.125 mg by mouth at bedtime as needed. Indications: Trouble Sleeping        .                 Medicines prescribed today were sent to:     [facility] - [address] [facility]    [address] [facility] [address] [place] [address]    Phone: [phone] Fax: [phone]    Open 24 Hours?: No    [facility] - [place], [place] - [address] [place] [address]    [address] [facility] [address]    Phone: [phone] Fax: [phone]    Open 24 Hours?: No      Medication refill instructions:       If your prescription bottle indicates you have medication refills left, it is not necessary to call your provider’s office. Please contact your pharmacy and they will refill your medication.    If your prescription bottle indicates you do not have any refills left, you may request refills at any time through one of the following ways: The online [employer] system (except Urgent Care), by calling your provider’s office, or by asking your pharmacy to contact your provider’s office with a refill request. Medication refills are processed only during regular business hours and may not be available until the next business day. Your provider may request additional information or to have a follow-up visit with you prior to refilling your medication.   *Please Note: Medication refills are assigned a new Rx number when refilled electronically. Your pharmacy may indicate that no refills were authorized even though a new prescription for the same medication is available at the pharmacy. Please request the medicine by name with the pharmacy before contacting your provider for a refill.           [employer] Access Code: [email or web address]  Expires: 3/16/2017  9:05 AM    [employer]  A secure, online tool to manage your health information     [employer]’s [employer]® is a secure, online tool that connects you to your personalized health information from the privacy of your home -- day or night - making it very easy for you to manage your healthcare. Once the activation process is completed, you can even access your medical information using the [employer] guerrero, which is available for free in the Apple Gu[employer]ro store or Google Play store.     [employer] provides the following levels of access (as shown below):   My Chart Features   [facility]  Care [facility]  Specialists [facility]  Urgent  Care Non-[facility]  Primary Care  Doctor   Email your healthcare team securely and privately 24/7 X X X    Manage appointments: schedule your next appointment; view details of past/upcoming appointments X      Request prescription refills. X      View recent personal medical records, including lab and immunizations X X X X   View health record, including health history, allergies, medications X X X X   Read reports about your outpatient visits, procedures, consult and ER notes X X X X   See your discharge summary, which is a recap of your hospital and/or ER visit that includes your diagnosis, lab results, and care plan. X X       How to register for [employer]:  1. Go to  [email or web address].  2. Click on the Sign Up Now box, which takes you to the New Member Sign Up page. You will need to provide the following information:  a. Enter your [employer] Access Code exactly as it appears at the top of this page. (You will not need to use this code after you’ve completed the sign-up process. If you do not sign up before the expiration date, you must request a new code.)   b. Enter your date of birth.   c. Enter your home email address.   d. Click Submit, and follow the next screen’s instructions.  3. Create a [employer] ID. This will be your [employer] login ID and cannot be changed, so think of one that is secure and easy to remember.  4. Create a [employer] password. You can change your password at any time.  5. Enter your Password Reset Question and Answer. This can be used at a later time if you forget your password.   6. Enter your e-mail address. This allows you to receive e-mail notifications when new information is available in [employer].  7. Click Sign Up. You can now view your health information.    For assistance activating your [employer] account, call [phone]

## 2017-02-14 NOTE — PROGRESS NOTES
"Subjective:   Praveen Khalil is a 66 y.o. male who presents today for hospital follow up.    Since the discharge from [facility] on 02/01/17 for syncope related to ventricular tachycardia treated with AICD status post  TAVR, he has been doing well. He admits to mild fatigue and shortness of breath. He denies chest pain, dizziness or syncope. He has been walking more.    Past Medical History   Diagnosis Date   • Pulmonary embolism (CMS-HCC)    • Hypertension    • Kidney stone    • Aortic stenosis      TAVR 1/9/17   • Anesthesia      \"Apnea at times with deep sedation\"    • Arthritis      osteo- hands   • Congestive heart failure (CMS-HCC)    • Sleep apnea      BiPAP with oxygen 2.5 L   • Cataract      bilat IOL    • Psychiatric problem      depression, anxiety    • Gout    • Atrial fibrillation (CMS-HCC)      A Fib    • Disorder of thyroid      Past Surgical History   Procedure Laterality Date   • Lumbar laminectomy diskectomy  2007   • Tonsillectomy     • Hip arthroplasty total  10/22/2013     Performed by Nader Barr M.D. at SURGERY [facility]   • Cataract extraction with iol     • Dupuytren contracture release Right 2014   • Transcatheter aortic valve replacement  1/9/2017     Procedure: TRANSCATHETER AORTIC VALVE REPLACEMENT WITH SCOUT;  Surgeon: Sidney Laguna M.D.;  Location: SURGERY [facility];  Service:      Family History   Problem Relation Age of Onset   • No Known Problems Mother    • No Known Problems Father      History   Smoking status   • Former Smoker -- 0.75 packs/day for 40 years   • Types: Cigarettes, Pipe   • Quit date: 03/01/2007   Smokeless tobacco   • Never Used     Comment: quit in march 2007     Allergies   Allergen Reactions   • Nkda [No Known Drug Allergy]      Medications reviewed.    Outpatient Encounter Prescriptions as of 2/14/2017   Medication Sig Dispense Refill   • amiodarone (PACERONE) 400 MG tablet One tab by mouth twice daily until Feb 10; on that day " change to one pill daily 40 Tab 0   • potassium chloride SA (KDUR) 20 MEQ Tab CR Take 20 mEq by mouth 2 times a day.     • calcium carbonate-vitamin D (OSCAL 500/200 D-3) 500-200 MG-UNIT Tab Take 2 Tabs by mouth every morning with breakfast.     • metoprolol (LOPRESSOR) 50 MG Tab Take 50 mg by mouth 2 times a day.     • aspirin EC (ECOTRIN) 81 MG Tablet Delayed Response Take 81 mg by mouth every morning.     • Cholecalciferol (VITAMIN D) 2000 UNIT Tab Take 2,000 Units by mouth every morning.     • Coenzyme Q10 (CO Q-10 MAXIMUM STRENGTH) 400 MG Cap Take 1 Cap by mouth every morning.     • bumetanide (BUMEX) 1 MG Tab Take 1 mg by mouth 2 times a day.     • apixaban (ELIQUIS) 5mg Tab Take 5 mg by mouth 2 Times a Day.     • escitalopram (LEXAPRO) 10 MG Tab Take 10 mg by mouth every day.     • levothyroxine (SYNTHROID) 50 MCG TABS Take 50 mcg by mouth Every morning on an empty stomach.     • aliskiren (TEKTURNA) 300 MG tablet Take 300 mg by mouth every day.     • allopurinol (ZYLOPRIM) 300 MG TABS Take 300 mg by mouth every morning.     • Multiple Vitamins-Minerals (MULTIVITAL PO) Take 1 Tab by mouth every day.     • GLUCOSAMINE MSM COMPLEX PO Take 1 Tab by mouth every day.     • triazolam (HALCION) 0.125 MG tablet Take 0.125 mg by mouth at bedtime as needed. Indications: Trouble Sleeping     • simvastatin (ZOCOR) 40 MG TABS Take 40 mg by mouth every evening.       No facility-administered encounter medications on file as of 2/14/2017.     Review of Systems   Constitutional: Positive for malaise/fatigue. Negative for fever and chills.   HENT: Negative for congestion.    Eyes: Negative for blurred vision.   Respiratory: Positive for shortness of breath.    Cardiovascular: Negative for chest pain, palpitations, orthopnea, leg swelling and PND.   Gastrointestinal: Negative for abdominal pain.   Genitourinary: Negative for dysuria.   Musculoskeletal: Negative for myalgias and joint pain.   Skin: Negative for rash.  "  Neurological: Negative for dizziness and loss of consciousness.   Psychiatric/Behavioral: The patient does not have insomnia.         Objective:   /78 mmHg  Pulse 72  Ht 1.905 m (6' 3\")  Wt 147.873 kg (326 lb)  BMI 40.75 kg/m2  SpO2 95%    Physical Exam   Constitutional: He is oriented to person, place, and time. He appears well-developed and well-nourished.   HENT:   Head: Normocephalic and atraumatic.   Eyes: Conjunctivae are normal. Pupils are equal, round, and reactive to light.   Neck: Normal range of motion. Neck supple.   Cardiovascular: Normal rate and regular rhythm.    Pulmonary/Chest: Effort normal and breath sounds normal.   Abdominal: Soft. Bowel sounds are normal.   Musculoskeletal: Normal range of motion. He exhibits no edema.   Neurological: He is alert and oriented to person, place, and time.   Skin: Skin is warm and dry.   Psychiatric: He has a normal mood and affect.     CARDIAC STUDIES/PROCEDURES:    ABDOMINAL ULTRASOUND (11/10/16)  Unremarkable abdominal aorta and IVC evaluation.    CARDIAC CATHETERIZATION CONCLUSIONS by Dr. Ureña (10/26/16)  Cardiac catheterization showing severe aortic stenosis, normal left ventricular systolic function, unremarkable epicardial coronary arteries.    CAROTID ULTRASOUND (12/28/15)  Carotid ultrasound showing mild plaques.    CT OF CHEST AND ABDOMEN (12/08/16)  1.  Aortic valve dimensions as given above. Aortic annulus is approximately 620 sq mm  2.  Coronary artery ostia are greater than 10 mm from the aortic annulus.  3.  Severe atherosclerosis with tortuous common and external iliac arteries. Narrowing is most pronounced in the proximal common iliac arteries with a minimal diameter of 6.4 mm on the right and 6.3 mm on the left.    CT OF CHEST (10/18/16)  No evidence of pulmonary embolism.    ECHOCARDIOGRAM CONCLUSIONS (01/31/17)  Prior echo- 01/10/17  Moderate concentric left ventricular hypertrophy.   Normal left ventricular systolic function. " Left ventricular ejection   fraction is visually estimated to be 65%.  Mildly dilated right ventricle.  Enlarged right atrium.   Mildly dilated left atrium  Moderate eccentric mitral regurgitation. ERO by PISA method is 0.24 sq cm.  Known TAVR aortic valve. Well seated with no paraalvular leak.     Vmax 2.5 m/sec, MG 14 mmHg, PG 31 mmHg.  Moderate tricuspid regurgitation.   Estimated right ventricular systolic pressure is 45 mmHg.   Mild to moderate pulmonary hypertension.    ECHOCARDIOGRAM CONCLUSIONS (01/10/17)  Normal left ventricular chamber size.  Left ventricular ejection fraction is visually estimated to be 65%.  Moderate concentric left ventricular hypertrophy.  Mild mitral stenosis.  Moderate mitral regurgitation.  Known TAVR aortic valve that is functioning normally with normal   transvalvular gradients.  Transvalvular gradients are - Peak: 19 mmHg, Mean: 12 mmHg.  No evidence of paravalvular leak noted.  Estimated right ventricular systolic pressure is 40 mmHg.    ECHOCARDIOGRAM CONCLUSIONS (10/19/16)  Echocardiogram showing normal left ventricular systolic function, ejection fraction of 55-60%, severe aortic stenosis with peak velocity of 4.4 m/s, peak gradient of 60 mmHg, mean gradient of 39 mmHg and calculated FRENCH of 0.7 cm2, moderate mitral regurgitation.    EKG performed on (02/01/17) was reviewed: EKG shows paced rhythm.  EKG performed on (12/06/16) EKG shows atrial fibrillation with non-specific intra-ventricular conduction delay.    Laboratory results of (01/31/17) were reviewed. Bun of 20 mg/dl, creatinine levels of 0.95 mg/dl noted.    PULMONARY FUNCTION INTERPRETATION (11/15/16)  Mild chronic obstructive pulmonary disease    Assessment:     1. S/P TAVR (transcatheter aortic valve replacement)     2. Chronic diastolic congestive heart failure (CMS-HCC)     3. Ventricular tachycardia (CMS-HCC)     4. ICD (implantable cardioverter-defibrillator) in place     5. Atrial fibrillation, unspecified  type (CMS-[facility])     6. History of pulmonary embolism     7. TAZ (obstructive sleep apnea)     8. Other emphysema (CMS-[facility])       Medical Decision Making:  Today's Assessment / Status / Plan:     1. Successful transcatheter aortic valve replacement (TAVR) with #29 Trent Douglas S3 valve, transfemoral approach under general anesthesia (01/09/17). He is clinically doing well, NYHA class I. We will repeat an echocardiogram at [facility] next January.  2. Chronic diastolic congestive heart failure: The overall volume status is adequate.  3. Ventricular tachycardia with [employer]) dual chamber AICD by Dr. Blankenship (01/31/17): No recurrence of ventricular tachycardia noted nor ICD activity reported.  4. Atrial fibrillation on anticoagulation therapy (Eliquis): He is doing well on anticoagulation and the ventricular rate is well controlled.  5. History of deep venous thrombosis and saddle pulmonary embolism (2006) with intolerance to warfarin: He is clinically doing well.  6. COPD on chronic oxygen therapy (2.5 L/min)    We will follow up with him in one year from TAVR standpoint and also have him follow up with his primary cardiologist, Dr. Urñea.    CC Taiwo Pyle and Reanna Schultz

## 2017-02-14 NOTE — Clinical Note
"     [facility]   [address]  [place] [address]  Phone: [phone]  Fax: [phone]              Praveen Khalil  1950    Encounter Date: 2/14/2017    Sidney Laguna M.D.          PROGRESS NOTE:  Subjective:   Praveen Khalil is a 66 y.o. male who presents today for hospital follow up.    Since the discharge from [facility] on 02/01/17 for syncope related to ventricular tachycardia treated with AICD status post  TAVR, he has been doing well. He admits to mild fatigue and shortness of breath. He denies chest pain, dizziness or syncope. He has been walking more.    Past Medical History   Diagnosis Date   • Pulmonary embolism (CMS-[facility])    • Hypertension    • Kidney stone    • Aortic stenosis      TAVR 1/9/17   • Anesthesia      \"Apnea at times with deep sedation\"    • Arthritis      osteo- hands   • Congestive heart failure (CMS-[facility])    • Sleep apnea      BiPAP with oxygen 2.5 L   • Cataract      bilat IOL    • Psychiatric problem      depression, anxiety    • Gout    • Atrial fibrillation (CMS-[facility])      A Fib    • Disorder of thyroid      Past Surgical History   Procedure Laterality Date   • Lumbar laminectomy diskectomy  2007   • Tonsillectomy     • Hip arthroplasty total  10/22/2013     Performed by Nader Barr M.D. at SURGERY [facility]   • Cataract extraction with iol     • Dupuytren contracture release Right 2014   • Transcatheter aortic valve replacement  1/9/2017     Procedure: TRANSCATHETER AORTIC VALVE REPLACEMENT WITH SCOUT;  Surgeon: Sidney Laguna M.D.;  Location: SURGERY [facility];  Service:      Family History   Problem Relation Age of Onset   • No Known Problems Mother    • No Known Problems Father      History   Smoking status   • Former Smoker -- 0.75 packs/day for 40 years   • Types: Cigarettes, Pipe   • Quit date: 03/01/2007   Smokeless tobacco   • Never Used     Comment: quit in march 2007     "     Allergies   Allergen Reactions   • Nkda [No Known Drug Allergy]      Medications reviewed.    Outpatient Encounter Prescriptions as of 2/14/2017   Medication Sig Dispense Refill   • amiodarone (PACERONE) 400 MG tablet One tab by mouth twice daily until Feb 10; on that day change to one pill daily 40 Tab 0   • potassium chloride SA (KDUR) 20 MEQ Tab CR Take 20 mEq by mouth 2 times a day.     • calcium carbonate-vitamin D (OSCAL 500/200 D-3) 500-200 MG-UNIT Tab Take 2 Tabs by mouth every morning with breakfast.     • metoprolol (LOPRESSOR) 50 MG Tab Take 50 mg by mouth 2 times a day.     • aspirin EC (ECOTRIN) 81 MG Tablet Delayed Response Take 81 mg by mouth every morning.     • Cholecalciferol (VITAMIN D) 2000 UNIT Tab Take 2,000 Units by mouth every morning.     • Coenzyme Q10 (CO Q-10 MAXIMUM STRENGTH) 400 MG Cap Take 1 Cap by mouth every morning.     • bumetanide (BUMEX) 1 MG Tab Take 1 mg by mouth 2 times a day.     • apixaban (ELIQUIS) 5mg Tab Take 5 mg by mouth 2 Times a Day.     • escitalopram (LEXAPRO) 10 MG Tab Take 10 mg by mouth every day.     • levothyroxine (SYNTHROID) 50 MCG TABS Take 50 mcg by mouth Every morning on an empty stomach.     • aliskiren (TEKTURNA) 300 MG tablet Take 300 mg by mouth every day.     • allopurinol (ZYLOPRIM) 300 MG TABS Take 300 mg by mouth every morning.     • Multiple Vitamins-Minerals (MULTIVITAL PO) Take 1 Tab by mouth every day.     • GLUCOSAMINE MSM COMPLEX PO Take 1 Tab by mouth every day.     • triazolam (HALCION) 0.125 MG tablet Take 0.125 mg by mouth at bedtime as needed. Indications: Trouble Sleeping     • simvastatin (ZOCOR) 40 MG TABS Take 40 mg by mouth every evening.       No facility-administered encounter medications on file as of 2/14/2017.     Review of Systems   Constitutional: Positive for malaise/fatigue. Negative for fever and chills.   HENT: Negative for congestion.    Eyes: Negative for blurred vision.   Respiratory: Positive for shortness of  "breath.    Cardiovascular: Negative for chest pain, palpitations, orthopnea, leg swelling and PND.   Gastrointestinal: Negative for abdominal pain.   Genitourinary: Negative for dysuria.   Musculoskeletal: Negative for myalgias and joint pain.   Skin: Negative for rash.   Neurological: Negative for dizziness and loss of consciousness.   Psychiatric/Behavioral: The patient does not have insomnia.         Objective:   /78 mmHg  Pulse 72  Ht 1.905 m (6' 3\")  Wt 147.873 kg (326 lb)  BMI 40.75 kg/m2  SpO2 95%    Physical Exam   Constitutional: He is oriented to person, place, and time. He appears well-developed and well-nourished.   HENT:   Head: Normocephalic and atraumatic.   Eyes: Conjunctivae are normal. Pupils are equal, round, and reactive to light.   Neck: Normal range of motion. Neck supple.   Cardiovascular: Normal rate and regular rhythm.    Pulmonary/Chest: Effort normal and breath sounds normal.   Abdominal: Soft. Bowel sounds are normal.   Musculoskeletal: Normal range of motion. He exhibits no edema.   Neurological: He is alert and oriented to person, place, and time.   Skin: Skin is warm and dry.   Psychiatric: He has a normal mood and affect.     CARDIAC STUDIES/PROCEDURES:    ABDOMINAL ULTRASOUND (11/10/16)  Unremarkable abdominal aorta and IVC evaluation.    CARDIAC CATHETERIZATION CONCLUSIONS by Dr. Ureña (10/26/16)  Cardiac catheterization showing severe aortic stenosis, normal left ventricular systolic function, unremarkable epicardial coronary arteries.    CAROTID ULTRASOUND (12/28/15)  Carotid ultrasound showing mild plaques.    CT OF CHEST AND ABDOMEN (12/08/16)  1.  Aortic valve dimensions as given above. Aortic annulus is approximately 620 sq mm  2.  Coronary artery ostia are greater than 10 mm from the aortic annulus.  3.  Severe atherosclerosis with tortuous common and external iliac arteries. Narrowing is most pronounced in the proximal common iliac arteries with a minimal " diameter of 6.4 mm on the right and 6.3 mm on the left.    CT OF CHEST (10/18/16)  No evidence of pulmonary embolism.    ECHOCARDIOGRAM CONCLUSIONS (01/31/17)  Prior echo- 01/10/17  Moderate concentric left ventricular hypertrophy.   Normal left ventricular systolic function. Left ventricular ejection   fraction is visually estimated to be 65%.  Mildly dilated right ventricle.  Enlarged right atrium.   Mildly dilated left atrium  Moderate eccentric mitral regurgitation. ERO by PISA method is 0.24 sq cm.  Known TAVR aortic valve. Well seated with no paraalvular leak.     Vmax 2.5 m/sec, MG 14 mmHg, PG 31 mmHg.  Moderate tricuspid regurgitation.   Estimated right ventricular systolic pressure is 45 mmHg.   Mild to moderate pulmonary hypertension.    ECHOCARDIOGRAM CONCLUSIONS (01/10/17)  Normal left ventricular chamber size.  Left ventricular ejection fraction is visually estimated to be 65%.  Moderate concentric left ventricular hypertrophy.  Mild mitral stenosis.  Moderate mitral regurgitation.  Known TAVR aortic valve that is functioning normally with normal   transvalvular gradients.  Transvalvular gradients are - Peak: 19 mmHg, Mean: 12 mmHg.  No evidence of paravalvular leak noted.  Estimated right ventricular systolic pressure is 40 mmHg.    ECHOCARDIOGRAM CONCLUSIONS (10/19/16)  Echocardiogram showing normal left ventricular systolic function, ejection fraction of 55-60%, severe aortic stenosis with peak velocity of 4.4 m/s, peak gradient of 60 mmHg, mean gradient of 39 mmHg and calculated FRENCH of 0.7 cm2, moderate mitral regurgitation.    EKG performed on (02/01/17) was reviewed: EKG shows paced rhythm.  EKG performed on (12/06/16) EKG shows atrial fibrillation with non-specific intra-ventricular conduction delay.    Laboratory results of (01/31/17) were reviewed. Bun of 20 mg/dl, creatinine levels of 0.95 mg/dl noted.    PULMONARY FUNCTION INTERPRETATION (11/15/16)  Mild chronic obstructive pulmonary  disease    Assessment:     1. S/P TAVR (transcatheter aortic valve replacement)     2. Chronic diastolic congestive heart failure (CMS-HCC)     3. Ventricular tachycardia (CMS-[facility])     4. ICD (implantable cardioverter-defibrillator) in place     5. Atrial fibrillation, unspecified type (CMS-[facility])     6. History of pulmonary embolism     7. TAZ (obstructive sleep apnea)     8. Other emphysema (CMS-[facility])       Medical Decision Making:  Today's Assessment / Status / Plan:     1. Successful transcatheter aortic valve replacement (TAVR) with #29 Trent Douglas S3 valve, transfemoral approach under general anesthesia (01/09/17). He is clinically doing well, NYHA class I. We will repeat an echocardiogram at [facility] next January.  2. Chronic diastolic congestive heart failure: The overall volume status is adequate.  3. Ventricular tachycardia with [employer] dual chamber AICD by Dr. Blankenship (01/31/17): No recurrence of ventricular tachycardia noted nor ICD activity reported.  4. Atrial fibrillation on anticoagulation therapy (Eliquis): He is doing well on anticoagulation and the ventricular rate is well controlled.  5. History of deep venous thrombosis and saddle pulmonary embolism (2006) with intolerance to warfarin: He is clinically doing well.  6. COPD on chronic oxygen therapy (2.5 L/min)    We will follow up with him in one year from TAVR standpoint and also have him follow up with his primary cardiologist, Dr. Ureña.    CC Taiwo Pyle and Reanna Schultz Recipients

## 2017-02-17 ENCOUNTER — TELEPHONE (OUTPATIENT)
Dept: CARDIOLOGY | Facility: MEDICAL CENTER | Age: 67
End: 2017-02-17

## 2017-02-17 NOTE — TELEPHONE ENCOUNTER
"Called [facility], spoke with Reza, patient has an \"open\" balance and it's showing there isn't any payment due.  Now that the home study has been rescored, patient is eligible for a new BPAP **or** supplies, whichever the patient chooses.    LEFT MESSAGE for patient to call back.  "

## 2017-02-18 NOTE — TELEPHONE ENCOUNTER
Called patient to advise zero balance; he'd like something in writing from [facility] with that information.  He'd also like to just get supplies at this time, he doesn't feel he needs a new BPAP machine.  Called Ayaka @ [employer], she will talk to the billing dept and work on helping patient with the balance/billing information.  Will fax an updated supply order on Tuesday 02/21/17.  Called patient back to advise; he'll f/up with [facility] next week.  PLEASE SIGN ORDER FOR SUPPLIES.

## 2017-02-18 NOTE — TELEPHONE ENCOUNTER
----- Message from Rand Watson sent at 2/17/2017  4:27 PM PST -----  Regarding: patient wants to speak to you about his implant  SHAHZAD/Raul      Patient wants to speak to you briefly about his TAVR implant. He can be reached at [phone].

## 2017-02-18 NOTE — TELEPHONE ENCOUNTER
S/w pt he wants to know if he can get a colonoscopy, he forgot to ask SHAHZAD when he saw him 2/14. Advised pt that GI consultants usually send SHAHZAD a letter requesting clearance with info on whether his blood thinner needs to be held. SHAHZAD can then review and evaluate. Pt appreciated info and will s/w his GI doctor.

## 2017-02-21 NOTE — ED PROVIDER NOTES
"   02/21/17 1300   Signing Clinician's Name / Credentials   Signing clinician's name / credentials Alyssa Goldsmith, OTR/L   OT Goal 1   Goal Description Client and his wife will verbalize comprehension of today's educational strategies.  This goal was met with encouragement.  Couple is encouraged to have some new resources and strategies to implement.  Client did not state any specific goals at this time due to reduced insight but did get teary eyed at one point, reflecting on how things are feeling more and more out of his control.   Therapeutic Interventions   Therapeutic Interventions Self Care/Home Management   Self Care/Home Management   Minutes 65 Minutes   Skilled Intervention Client and his wife, Lay, were provided with new written caregiving materials for level 4.0 and verbal instruction in specific therapeutic interventions relevant to where they are currently at on this journey:  1.) Educated in increasing client's safety via consistent 24-hour supervision options either at home or in a facility that could provide this consistently for him/her (brissa. w/meds., driving, biking, and cooking).  Lay has now been setting up and administering client's meds. twice daily.  D/T errors with meds. during her absense, spouse was instructed to put evening medications away so client does not have access to them during the day while she's at work.  Lay was given resources for \"Protecting Those Who Wander\" as a proactive and preventitive measure; 2.) benefits of continued involvement in physical and cognitively stimulating activities (reading, puzzles, word/number games), as well as social, and emotional support; 3.) benefits of maintaining a structured, yet simple, daily routine to encourage success with daily activities (i.e., Each night before bed work w/client to select appropriate clothing for the next day then set it out, etc.); 4.) educated in necessity of having client discontinue all hazardous " "ED Provider Note    CHIEF COMPLAINT  Chief Complaint   Patient presents with   • ALOC     pt was at home felt dizzy and had tunnel vision, and had ALOC. Wife woke pt and called EMS       HPI  Praveen Khalil is a 66 y.o. male who presents by ambulance after syncope at home. Patient reports feeling very dizzy before he lost consciousness. Unknown duration of loss of consciousness. Medics included a rhythm strip with a wide complex tachycardia at 215 that looks like V. tach per my review. Patient underwent a TAVR aortic valve replacement 3 weeks ago. He's had a negative coronary arteriogram. History of heart failure due to severe aortic stenosis. History of atrial fibrillation. On digoxin and Eliquis. Patient did have 2-3 minutes of retrosternal chest pain after he regained consciousness.    REVIEW OF SYSTEMS  Pertinent positives include: Syncope, dizziness, incontinent of stool, chest pain, possible ventricular tachycardia, history of atrial fibrillation.  Pertinent negatives include: Headache, current chest pain, leg swelling, calf pain, DVT, abdominal pain.  10+ systems reviewed and negative.      PAST MEDICAL HISTORY  Past Medical History   Diagnosis Date   • Pulmonary embolism (CMS-[facility])    • Hypertension    • Kidney stone    • Aortic stenosis      TAVR 1/9/17   • Anesthesia      \"Apnea at times with deep sedation\"    • Arthritis      osteo- hands   • Congestive heart failure (CMS-HCC)    • Sleep apnea      BiPAP with oxygen 2.5 L   • Cataract      bilat IOL    • Psychiatric problem      depression, anxiety    • Gout    • Atrial fibrillation (CMS-HCC)      A Fib    • Disorder of thyroid        FAMILY HISTORY  No coronary artery disease.    SOCIAL HISTORY  Social History   Substance Use Topics   • Smoking status: Former Smoker -- 0.75 packs/day for 40 years     Types: Cigarettes, Pipe     Quit date: 03/01/2007   • Smokeless tobacco: Never Used      Comment: quit in march 2007   • Alcohol Use: No     History "   Drug Use No       SURGICAL HISTORY  Past Surgical History   Procedure Laterality Date   • Lumbar laminectomy diskectomy  2007   • Tonsillectomy     • Hip arthroplasty total  10/22/2013     Performed by Nader Barr M.D. at SURGERY [facility]   • Cataract extraction with iol     • Dupuytren contracture release Right 2014   • Transcatheter aortic valve replacement  1/9/2017     Procedure: TRANSCATHETER AORTIC VALVE REPLACEMENT WITH SCOUT;  Surgeon: Sidney Laguan M.D.;  Location: SURGERY [facility];  Service:        CURRENT MEDICATIONS  No current facility-administered medications for this encounter.    Current outpatient prescriptions:   •  neomycin-polymixin-dexamethasone (MAXITROL) 3.5-66653-3.1 Ointment ophthalmic ointment, , Disp: , Rfl:   •  calcium carbonate-vitamin D (OSCAL 500/200 D-3) 500-200 MG-UNIT Tab, Take 2 Tabs by mouth every morning with breakfast., Disp: , Rfl:   •  metoprolol (LOPRESSOR) 50 MG Tab, Take 50 mg by mouth 3 times a day, with meals., Disp: , Rfl:   •  aspirin EC (ECOTRIN) 81 MG Tablet Delayed Response, Take 81 mg by mouth every day., Disp: , Rfl:   •  Cholecalciferol (VITAMIN D) 2000 UNIT Tab, Take 2,000 Units by mouth every day., Disp: , Rfl:   •  Coenzyme Q10 (CO Q-10 MAXIMUM STRENGTH) 400 MG Cap, Take 1 Cap by mouth every day., Disp: , Rfl:   •  bumetanide (BUMEX) 1 MG Tab, Take 1 mg by mouth 2 times a day., Disp: , Rfl:   •  digoxin (LANOXIN) 250 MCG Tab, Take 250 mcg by mouth every day., Disp: , Rfl:   •  diltiazem (DILACOR XR) 240 MG XR capsule, Take 240 mg by mouth every day., Disp: , Rfl:   •  apixaban (ELIQUIS) 5mg Tab, Take 5 mg by mouth 2 Times a Day., Disp: , Rfl:   •  escitalopram (LEXAPRO) 10 MG Tab, Take 10 mg by mouth every day., Disp: , Rfl:   •  triazolam (HALCION) 0.125 MG tablet, Take 0.125 mg by mouth at bedtime as needed., Disp: , Rfl:   •  levothyroxine (SYNTHROID) 50 MCG TABS, Take 50 mcg by mouth every day., Disp: , Rfl:   •  aliskiren (TEKTURNA) 300  activities (brissa. independent cooking, med. mgmt., lawn/yard maintenance, and driving); 5.) educated in benefits of regular involvement in social activities and/or a structured adult day program.  Spouse has been considering her local options and was also given resources for the [employer]; 6.) family has since decided to have client discontinue driving since our last visit 11/8/16.  Client seems okay with this decision so far.  Spouse was encouraged to continuing pursuing other transportation options (i.e, [employer]), bike club/group, etc.   Assessments Completed   Assessments Completed N/A   Education   Learner Patient;Significant other   Readiness Acceptance   Method Booklet/handout;Literature;Explanation   Response Needs reinforcement;Verbalizes understanding   Communication with other professionals   Communication with other professionals [facility] via EPIC   Plan   Plan for next session Client (and family) would benefit from follow-up Cognitive Performance Testing after November of 2018 to ensure that the client's most immediate needs are continually being met.   Total Session Time   Total Session Time 65   AMBULATORY CLINICS ONLY-MEDICAL AND TREATMENT DIAGNOSIS   Medical Diagnosis Major neurocognitive disorder d/t Alzheimer's disease of moderate severity   OT Diagnosis moderate cognitive impairment      "MG tablet, Take 300 mg by mouth every day., Disp: , Rfl:   •  potassium chloride (KLOR-CON) 20 MEQ PACK, Take 20 mEq by mouth 2 times a day as needed. When taking lasix prn, Disp: , Rfl:   •  allopurinol (ZYLOPRIM) 300 MG TABS, Take 300 mg by mouth every day., Disp: , Rfl:   •  simvastatin (ZOCOR) 40 MG TABS, Take 40 mg by mouth every evening., Disp: , Rfl:   •  Multiple Vitamins-Minerals (MULTIVITAL PO), Take 1 Tab by mouth every day., Disp: , Rfl:   •  GLUCOSAMINE MSM COMPLEX PO, Take 1 Tab by mouth every day., Disp: , Rfl:       ALLERGIES  Allergies   Allergen Reactions   • Nkda [No Known Drug Allergy]        PHYSICAL EXAM  VITAL SIGNS: Pulse 85  Resp 16  Ht 1.905 m (6' 3\")  Wt 152 kg (335 lb 1.6 oz)  BMI 41.88 kg/m2  SpO2 98%  Reviewed and normal including no hypoxia on room air  Constitutional: Well developed, Well nourished, moderately obese, well-appearing.  HENT: Normocephalic, atraumatic, bilateral external ears normal, oropharynx moist, No exudates or erythema.   Eyes: PERRLA, conjunctiva pink, no scleral icterus.   Cardiovascular: Irregularly irregular but normal rate without murmur, rub, gallop. No JVD or bruit. No dependent edema or calf cords.  Respiratory: No rales, rhonchi, wheeze.  Gastrointestinal: Soft, obese, nontender.  Skin: No erythema, no rash.   Genitourinary:  No costovertebral angle tenderness.   Neurologic: Alert & oriented x 3, cranial nerves 2-12 intact by passive exam.  No focal deficit noted.  Psychiatric: Affect normal, Judgment normal, Mood normal.     DIFFERENTIAL DIAGNOSIS:  Ventricular tachycardia, syncope, myocardial ischemia, atrial fibrillation, congestive heart failure, dig toxicity.    EKG  EKG Interpretation    Interpreted by me.  Indication: Syncope    Rhythm: irregularly irregular  Rate: Normal at 86  Axis: normal  Ectopy: Premature ventricular contraction  Conduction: Intraventricular conduction delay pattern  ST Segments: no ST change  T Waves: no acute change  Q " Waves: none  Comparison:     Clinical Impression: Rate controlled atrial fibrillation with PVC and intraventricular conduction delay      RADIOLOGY/PROCEDURES  DX-CHEST-LIMITED (1 VIEW)   Final Result         1. Mild diffuse interstitial prominence could relate to chronic change or mild pulmonary edema.          LABORATORY:  Results for orders placed or performed during the hospital encounter of 01/29/17   CBC WITH DIFFERENTIAL   Result Value Ref Range    WBC 9.1 4.8 - 10.8 K/uL    RBC 4.16 (L) 4.70 - 6.10 M/uL    Hemoglobin 11.9 (L) 14.0 - 18.0 g/dL    Hematocrit 37.4 (L) 42.0 - 52.0 %    MCV 89.9 81.4 - 97.8 fL    MCH 28.6 27.0 - 33.0 pg    MCHC 31.8 (L) 33.7 - 35.3 g/dL    RDW 56.7 (H) 35.9 - 50.0 fL    Platelet Count 107 (L) 164 - 446 K/uL    MPV 10.8 9.0 - 12.9 fL    Neutrophils-Polys 73.90 (H) 44.00 - 72.00 %    Lymphocytes 14.60 (L) 22.00 - 41.00 %    Monocytes 7.40 0.00 - 13.40 %    Eosinophils 2.80 0.00 - 6.90 %    Basophils 0.60 0.00 - 1.80 %    Immature Granulocytes 0.70 0.00 - 0.90 %    Nucleated RBC 0.00 /100 WBC    Neutrophils (Absolute) 6.72 1.82 - 7.42 K/uL    Lymphs (Absolute) 1.33 1.00 - 4.80 K/uL    Monos (Absolute) 0.67 0.00 - 0.85 K/uL    Eos (Absolute) 0.25 0.00 - 0.51 K/uL    Baso (Absolute) 0.05 0.00 - 0.12 K/uL    Immature Granulocytes (abs) 0.06 0.00 - 0.11 K/uL    NRBC (Absolute) 0.00 K/uL   COMP METABOLIC PANEL   Result Value Ref Range    Sodium 139 135 - 145 mmol/L    Potassium 3.4 (L) 3.6 - 5.5 mmol/L    Chloride 104 96 - 112 mmol/L    Co2 26 20 - 33 mmol/L    Anion Gap 9.0 0.0 - 11.9    Glucose 153 (H) 65 - 99 mg/dL    Bun 26 (H) 8 - 22 mg/dL    Creatinine 1.15 0.50 - 1.40 mg/dL    Calcium 9.5 8.5 - 10.5 mg/dL    AST(SGOT) 70 (H) 12 - 45 U/L    ALT(SGPT) 53 (H) 2 - 50 U/L    Alkaline Phosphatase 66 30 - 99 U/L    Total Bilirubin 0.8 0.1 - 1.5 mg/dL    Albumin 4.0 3.2 - 4.9 g/dL    Total Protein 7.0 6.0 - 8.2 g/dL    Globulin 3.0 1.9 - 3.5 g/dL    A-G Ratio 1.3 g/dL   TROPONIN   Result  Value Ref Range    Troponin I 0.02 0.00 - 0.04 ng/mL   BTYPE NATRIURETIC PEPTIDE   Result Value Ref Range    B Natriuretic Peptide 106 (H) 0 - 100 pg/mL   PROTHROMBIN TIME   Result Value Ref Range    PT 15.9 (H) 12.0 - 14.6 sec    INR 1.23 (H) 0.87 - 1.13   APTT   Result Value Ref Range    APTT 28.2 24.7 - 36.0 sec   TSH   Result Value Ref Range    TSH 2.810 0.300 - 3.700 uIU/mL   MAGNESIUM   Result Value Ref Range    Magnesium 2.0 1.5 - 2.5 mg/dL   DIGOXIN   Result Value Ref Range    Digoxin 0.8 0.8 - 2.0 ng/mL   TROPONIN   Result Value Ref Range    Troponin I 0.37 (H) 0.00 - 0.04 ng/mL       INTERVENTIONS:  Case discussed with cardiology Dr. Hood who will present to evaluate the patient. Case discussed with the hospitalist to admit the patient to the cardiac care unit.      COURSE & MEDICAL DECISION MAKING  This patient presents with syncope associated with incontinence and based on medic rhythm strip had an apparent run of ventricular tachycardia. It's unclear whether this was primary or triggered by myocardial ischemia although there are no ischemic changes on his current EKG. Patient did have chest pain after the episode. Patient has a history of recent TAB R procedure and known atrial fibrillation and prior pulmonary embolism..    PLAN:  Admit cardiac care unit  Neurology consultation  AICD placement  Antiarrhythmics    CONDITION: Guarded, critical care time 35 minutes.    FINAL IMPRESSION  1. Ventricular tachycardia (CMS-HCC)    2. Syncope, unspecified syncope type    3. Ischemic chest pain (CMS-HCC)    4.      Critical care      Electronically signed by: Donato Horvath, 1/29/2017 7:16 PM

## 2017-05-23 ENCOUNTER — TELEPHONE (OUTPATIENT)
Dept: CARDIOLOGY | Facility: MEDICAL CENTER | Age: 67
End: 2017-05-23

## 2017-05-23 NOTE — TELEPHONE ENCOUNTER
Clearance received from [facility]  Procedure: Colonoscopy under deep sedation  Date: 7/28/17  MD: Dr. Yassine Nicolas    Requesting cardiac clearance and to hold Eliquis for 2 days prior.   To JI to review.   Clearance placed in JI folder.

## 2017-05-30 NOTE — TELEPHONE ENCOUNTER
SHAHZAD completed clearance form and has cleared pt for colonoscopy. clearance faxed back to [facility] at [phone].

## 2017-07-14 ENCOUNTER — RX ONLY (OUTPATIENT)
Age: 67
Setting detail: RX ONLY
End: 2017-07-14

## 2017-09-22 ENCOUNTER — TELEPHONE (OUTPATIENT)
Dept: CARDIOLOGY | Facility: MEDICAL CENTER | Age: 67
End: 2017-09-22

## 2017-09-22 NOTE — TELEPHONE ENCOUNTER
S/w pts wife Mary. Pt is s/p TAVR 1/09/2017. They will be out of town for 5 months for the winter and wanted to know if they should schedule the 1 yr TAVR follow up before or after their trip. Unable to get pt in the 305-425day window due to trip dates and SHAHZAD's clinic schedule. Pt has been scheduled early, 11/2 as requested by wife.     ----- Message from Rand Watson sent at 9/21/2017  2:39 PM PDT -----  Regarding: question about annual office visit  SHAHZAD/Raul     Patient is due to return for annual appt in Mid-February. He will be away for the winter and return to [place] in Mid-March. He wants to know if he can wait until he returns, or if he should make an appt to come in before he leaves town in November. He can be reached at [phone].

## 2017-10-20 ENCOUNTER — OFFICE VISIT (OUTPATIENT)
Dept: PULMONOLOGY | Facility: HOSPICE | Age: 67
End: 2017-10-20
Payer: MEDICARE

## 2017-10-20 VITALS
DIASTOLIC BLOOD PRESSURE: 90 MMHG | HEIGHT: 75 IN | TEMPERATURE: 97 F | HEART RATE: 87 BPM | OXYGEN SATURATION: 87 % | WEIGHT: 315 LBS | SYSTOLIC BLOOD PRESSURE: 146 MMHG | RESPIRATION RATE: 16 BRPM | BODY MASS INDEX: 39.17 KG/M2

## 2017-10-20 DIAGNOSIS — J43.8 OTHER EMPHYSEMA (HCC): ICD-10-CM

## 2017-10-20 DIAGNOSIS — G47.33 OSA (OBSTRUCTIVE SLEEP APNEA): ICD-10-CM

## 2017-10-20 PROCEDURE — 99213 OFFICE O/P EST LOW 20 MIN: CPT | Performed by: NURSE PRACTITIONER

## 2017-10-20 NOTE — PROGRESS NOTES
"Chief Complaint   Patient presents with   • Annual Exam         HPI:  This is a 67 y.o. male with a history of chronic obstructive pulmonary disease.      Past Medical History:   Diagnosis Date   • Anesthesia     \"Apnea at times with deep sedation\"    • Aortic stenosis     TAVR 1/9/17   • Arthritis     osteo- hands   • Atrial fibrillation (CMS-HCC)     A Fib    • Cataract     bilat IOL    • Congestive heart failure (CMS-[facility])    • Disorder of thyroid    • Gout    • Hypertension    • Kidney stone    • Psychiatric problem     depression, anxiety    • Pulmonary embolism (CMS-[facility])    • Sleep apnea     BiPAP with oxygen 2.5 L       Past Surgical History:   Procedure Laterality Date   • TRANSCATHETER AORTIC VALVE REPLACEMENT  1/9/2017    Procedure: TRANSCATHETER AORTIC VALVE REPLACEMENT WITH SCOUT;  Surgeon: Sidney Laguna M.D.;  Location: SURGERY [facility];  Service:    • DUPUYTREN CONTRACTURE RELEASE Right 2014   • HIP ARTHROPLASTY TOTAL  10/22/2013    Performed by Nader Barr M.D. at SURGERY [facility]   • LUMBAR LAMINECTOMY DISKECTOMY  2007   • CATARACT EXTRACTION WITH IOL     • TONSILLECTOMY         Social History   Substance Use Topics   • Smoking status: Former Smoker     Packs/day: 0.75     Years: 40.00     Types: Cigarettes, Pipe     Quit date: 3/1/2007   • Smokeless tobacco: Never Used      Comment: quit in march 2007   • Alcohol use No       ROS:   Constitutional: Denies fevers, chills, sweats, fatigue, and weight loss.  Eyes: Denies glasses.  Ears/nose/mouth/throat: Denies injury.  Cardiovascular: Denies chest pain, tightness.  Respiratory: See history of present illness.  GI: Denies heartburn, difficulty swallowing, nausea, and vomiting.  Neurological: Denies frequent headaches, dizziness, weakness.    Vitals:  Vitals:    10/20/17 1407   Height: 1.905 m (6' 3\")   Weight: (!) 161.5 kg (356 lb)   Weight % change since last entry.: 0 %   BP: 146/90   Pulse: 87   BMI (Calculated): 44.5   Resp: 16 "   Temp: 36.1 °C (97 °F)   O2 sat % room air: 87 %       Allergies:  Nkda [no known drug allergy]    Medications:  Current Outpatient Prescriptions   Medication Sig Dispense Refill   • potassium chloride SA (KDUR) 20 MEQ Tab CR Take 1 Tab by mouth 2 times a day. 180 Tab 3   • amiodarone (PACERONE) 400 MG tablet One tab by mouth twice daily until Feb 10; on that day change to one pill daily (Patient taking differently: 100 mg. One tab by mouth twice daily until Feb 10; on that day change to one pill daily) 40 Tab 0   • calcium carbonate-vitamin D (OSCAL 500/200 D-3) 500-200 MG-UNIT Tab Take 2 Tabs by mouth every morning with breakfast.     • metoprolol (LOPRESSOR) 50 MG Tab Take 50 mg by mouth 2 times a day.     • Cholecalciferol (VITAMIN D) 2000 UNIT Tab Take 2,000 Units by mouth every morning.     • Coenzyme Q10 (CO Q-10 MAXIMUM STRENGTH) 400 MG Cap Take 1 Cap by mouth every morning.     • bumetanide (BUMEX) 1 MG Tab Take 1 mg by mouth 2 times a day.     • apixaban (ELIQUIS) 5mg Tab Take 5 mg by mouth 2 Times a Day.     • escitalopram (LEXAPRO) 10 MG Tab Take 10 mg by mouth every day.     • triazolam (HALCION) 0.125 MG tablet Take 0.125 mg by mouth at bedtime as needed. Indications: Trouble Sleeping     • levothyroxine (SYNTHROID) 50 MCG TABS Take 50 mcg by mouth Every morning on an empty stomach.     • aliskiren (TEKTURNA) 300 MG tablet Take 300 mg by mouth every day.     • allopurinol (ZYLOPRIM) 300 MG TABS Take 300 mg by mouth every morning.     • Multiple Vitamins-Minerals (MULTIVITAL PO) Take 1 Tab by mouth every day.     • GLUCOSAMINE MSM COMPLEX PO Take 1 Tab by mouth every day.     • aspirin EC (ECOTRIN) 81 MG Tablet Delayed Response Take 81 mg by mouth every morning.     • simvastatin (ZOCOR) 40 MG TABS Take 40 mg by mouth every evening.       No current facility-administered medications for this visit.        PHYSICAL EXAM:  Appearance: Well-developed, well-nourished, no acute distress.  Eyes.  PERRL.  Hearing: Grossly intact.  Oropharynx: Tongue normal, posterior pharynx without erythema or exudate.  Respiratory effort: No intercostal retractions or use of accessory muscles.  Lung auscultation: No crackles, wheezing.  Heart auscultation: No murmur, gallop, or rub. Regular rate and rhythm.  Extremities: No cyanosis or edema.  Gait and Station: Normal  Orientation: Oriented to time, place, and person.    Assessment:  1. Other emphysema (CMS-HCC)     2. TAZ (obstructive sleep apnea)           Plan:  ***  No Follow-up on file.

## 2017-10-20 NOTE — PROGRESS NOTES
"Chief Complaint   Patient presents with   • Annual Exam         HPI:  This is a 67 y.o. male with a history of obstructive sleep apnea.  Polysomnogram indicates AHI61.5 and minimum saturation 82%. The patient is compliant with BiPAP 18/14 cm with 2 L of oxygen bleed in. The patient has forgotten his compliance card but reports using it nightly. He has no problems tolerating the mask pressure. He is well rested. He sees the [facility] pulmonology for mild COPD. Records are not available.    Past Medical History:   Diagnosis Date   • Anesthesia     \"Apnea at times with deep sedation\"    • Aortic stenosis     TAVR 1/9/17   • Arthritis     osteo- hands   • Atrial fibrillation (CMS-HCC)     A Fib    • Cataract     bilat IOL    • Congestive heart failure (CMS-HCC)    • Disorder of thyroid    • Gout    • Hypertension    • Kidney stone    • Psychiatric problem     depression, anxiety    • Pulmonary embolism (CMS-HCC)    • Sleep apnea     BiPAP with oxygen 2.5 L       Past Surgical History:   Procedure Laterality Date   • TRANSCATHETER AORTIC VALVE REPLACEMENT  1/9/2017    Procedure: TRANSCATHETER AORTIC VALVE REPLACEMENT WITH SCOUT;  Surgeon: Sidney Laguna M.D.;  Location: SURGERY [facility];  Service:    • DUPUYTREN CONTRACTURE RELEASE Right 2014   • HIP ARTHROPLASTY TOTAL  10/22/2013    Performed by Nader Barr M.D. at SURGERY [facility]   • LUMBAR LAMINECTOMY DISKECTOMY  2007   • CATARACT EXTRACTION WITH IOL     • TONSILLECTOMY         Social History   Substance Use Topics   • Smoking status: Former Smoker     Packs/day: 0.75     Years: 40.00     Types: Cigarettes, Pipe     Quit date: 3/1/2007   • Smokeless tobacco: Never Used      Comment: quit in march 2007   • Alcohol use No       ROS:   Constitutional: Denies fevers, chills, sweats, fatigue, and weight loss.  Eyes: Denies glasses.  Ears/nose/mouth/throat: Denies injury.  Cardiovascular: Denies chest pain, tightness.  Respiratory: Denies shortness of breath, " "cough, sputum, wheezing, hemoptysis.  GI: Denies heartburn, difficulty swallowing, nausea, and vomiting.  Neurological: Denies frequent headaches, dizziness, weakness.  Sleep: See history of present illness.    Vitals:  Vitals:    10/20/17 1407   Height: 1.905 m (6' 3\")   Weight: (!) 161.5 kg (356 lb)   Weight % change since last entry.: 0 %   BP: 146/90   Pulse: 87   BMI (Calculated): 44.5   Resp: 16   Temp: 36.1 °C (97 °F)   O2 sat % room air: 87 %     Allergies:  Nkda [no known drug allergy]    Medications.  Current Outpatient Prescriptions   Medication Sig Dispense Refill   • potassium chloride SA (KDUR) 20 MEQ Tab CR Take 1 Tab by mouth 2 times a day. 180 Tab 3   • amiodarone (PACERONE) 400 MG tablet One tab by mouth twice daily until Feb 10; on that day change to one pill daily (Patient taking differently: 100 mg. One tab by mouth twice daily until Feb 10; on that day change to one pill daily) 40 Tab 0   • calcium carbonate-vitamin D (OSCAL 500/200 D-3) 500-200 MG-UNIT Tab Take 2 Tabs by mouth every morning with breakfast.     • metoprolol (LOPRESSOR) 50 MG Tab Take 50 mg by mouth 2 times a day.     • Cholecalciferol (VITAMIN D) 2000 UNIT Tab Take 2,000 Units by mouth every morning.     • Coenzyme Q10 (CO Q-10 MAXIMUM STRENGTH) 400 MG Cap Take 1 Cap by mouth every morning.     • bumetanide (BUMEX) 1 MG Tab Take 1 mg by mouth 2 times a day.     • apixaban (ELIQUIS) 5mg Tab Take 5 mg by mouth 2 Times a Day.     • escitalopram (LEXAPRO) 10 MG Tab Take 10 mg by mouth every day.     • triazolam (HALCION) 0.125 MG tablet Take 0.125 mg by mouth at bedtime as needed. Indications: Trouble Sleeping     • levothyroxine (SYNTHROID) 50 MCG TABS Take 50 mcg by mouth Every morning on an empty stomach.     • aliskiren (TEKTURNA) 300 MG tablet Take 300 mg by mouth every day.     • allopurinol (ZYLOPRIM) 300 MG TABS Take 300 mg by mouth every morning.     • Multiple Vitamins-Minerals (MULTIVITAL PO) Take 1 Tab by mouth every " day.     • GLUCOSAMINE MSM COMPLEX PO Take 1 Tab by mouth every day.     • aspirin EC (ECOTRIN) 81 MG Tablet Delayed Response Take 81 mg by mouth every morning.     • simvastatin (ZOCOR) 40 MG TABS Take 40 mg by mouth every evening.       No current facility-administered medications for this visit.        PHYSICAL EXAM:  Appearance: Well-developed, well-nourished, no acute distress.  Eyes. PERRL.  Hearing: Grossly intact.  Oropharynx: Tongue normal, posterior pharynx without erythema or exudate.  Respiratory effort: No intercostal retractions or use of accessory muscles.  Lung auscultation: No crackles, wheezing.  Heart auscultation: No murmur, gallop, or rub. Regular rate and rhythm.  Extremities: No cyanosis or edema.  Gait and Station: Normal  Orientation: Oriented to time, place, and person.    Assessment:  1. Other emphysema (CMS-HCC)     2. TAZ (obstructive sleep apnea)           Plan:  1. Continue BiPAP 18 4/14 cm with 2 L of oxygen bleed in.  2. Clean equipment weekly replacement supplies as allowed by insurance.  3. I have explained to the patient that he can see [facility] or [facility] for pulmonary and sleep or if he would prefer he can continue to see us for sleep and [facility] for pulmonary. He is aware that it is his choice.    Return in about 1 year (around 10/20/2018) for With TUNDE Choudhary.

## 2017-10-20 NOTE — PATIENT INSTRUCTIONS
1. Continue BiPAP 18 4/14 cm with 2 L of oxygen bleed in.  2. Clean equipment weekly replacement supplies as allowed by insurance.  3. I have explained to the patient that he can see [facility] or [facility] for pulmonary and sleep or if he would prefer he can continue to see us for sleep and [facility] for pulmonary. He is aware that it is his choice.

## 2017-11-02 ENCOUNTER — OFFICE VISIT (OUTPATIENT)
Dept: CARDIOLOGY | Facility: MEDICAL CENTER | Age: 67
End: 2017-11-02
Payer: MEDICARE

## 2017-11-02 VITALS
HEART RATE: 83 BPM | SYSTOLIC BLOOD PRESSURE: 172 MMHG | HEIGHT: 75 IN | OXYGEN SATURATION: 92 % | DIASTOLIC BLOOD PRESSURE: 90 MMHG | BODY MASS INDEX: 39.17 KG/M2 | WEIGHT: 315 LBS

## 2017-11-02 DIAGNOSIS — I50.32 CHRONIC DIASTOLIC CONGESTIVE HEART FAILURE (HCC): ICD-10-CM

## 2017-11-02 DIAGNOSIS — Z95.2 S/P TAVR (TRANSCATHETER AORTIC VALVE REPLACEMENT): ICD-10-CM

## 2017-11-02 PROCEDURE — 99213 OFFICE O/P EST LOW 20 MIN: CPT | Performed by: INTERNAL MEDICINE

## 2017-11-02 ASSESSMENT — ENCOUNTER SYMPTOMS
ORTHOPNEA: 0
MYALGIAS: 0
PND: 0
ABDOMINAL PAIN: 0
BLURRED VISION: 0
PALPITATIONS: 0
DIZZINESS: 0
FEVER: 0
INSOMNIA: 0
LOSS OF CONSCIOUSNESS: 0
CHILLS: 0
SHORTNESS OF BREATH: 1

## 2017-11-02 NOTE — LETTER
"     [facility]   [address], [address]  [place] [address]  Phone: [phone]  Fax: [phone]              Praveen Khalil  1950    Encounter Date: 11/2/2017    Sidney Laguna M.D.          PROGRESS NOTE:  Subjective:   Praveen Khalil is a 67 y.o. male who presents today for TAVR follow up.    Since the patient's last visit on 02/14/17, he has been doing well clinically. He admits to mild fatigue and shortness of breath. He denies chest pain, dizziness or syncope. He has been walking more. He is moving to [place] for this winter.    Past Medical History:   Diagnosis Date   • Anesthesia     \"Apnea at times with deep sedation\"    • Aortic stenosis     TAVR 1/9/17   • Arthritis     osteo- hands   • Atrial fibrillation (CMS-HCC)     A Fib    • Cataract     bilat IOL    • Congestive heart failure (CMS-HCC)    • Disorder of thyroid    • Gout    • Hypertension    • Kidney stone    • Psychiatric problem     depression, anxiety    • Pulmonary embolism (CMS-[facility])    • Sleep apnea     BiPAP with oxygen 2.5 L     Past Surgical History:   Procedure Laterality Date   • TRANSCATHETER AORTIC VALVE REPLACEMENT  1/9/2017    Procedure: TRANSCATHETER AORTIC VALVE REPLACEMENT WITH SCOUT;  Surgeon: Sidney Laguna M.D.;  Location: SURGERY [facility];  Service:    • DUPUYTREN CONTRACTURE RELEASE Right 2014   • HIP ARTHROPLASTY TOTAL  10/22/2013    Performed by Nader Barr M.D. at SURGERY [facility]   • LUMBAR LAMINECTOMY DISKECTOMY  2007   • CATARACT EXTRACTION WITH IOL     • TONSILLECTOMY       Family History   Problem Relation Age of Onset   • No Known Problems Mother    • No Known Problems Father      History   Smoking Status   • Former Smoker   • Packs/day: 0.75   • Years: 40.00   • Types: Cigarettes, Pipe   • Quit date: 3/1/2007   Smokeless Tobacco   • Never Used     Comment: quit in march 2007     Allergies   Allergen Reactions   • Nkda [No Known Drug " Allergy]      Medications reviewed.    Outpatient Encounter Prescriptions as of 11/2/2017   Medication Sig Dispense Refill   • Rosuvastatin Calcium (CRESTOR PO) Take  by mouth.     • potassium chloride SA (KDUR) 20 MEQ Tab CR Take 1 Tab by mouth 2 times a day. 180 Tab 3   • amiodarone (PACERONE) 400 MG tablet One tab by mouth twice daily until Feb 10; on that day change to one pill daily (Patient taking differently: 100 mg. One tab by mouth twice daily until Feb 10; on that day change to one pill daily) 40 Tab 0   • calcium carbonate-vitamin D (OSCAL 500/200 D-3) 500-200 MG-UNIT Tab Take 2 Tabs by mouth every morning with breakfast.     • metoprolol (LOPRESSOR) 50 MG Tab Take 75 mg by mouth 2 times a day.     • Cholecalciferol (VITAMIN D) 2000 UNIT Tab Take 2,000 Units by mouth every morning.     • Coenzyme Q10 (CO Q-10 MAXIMUM STRENGTH) 400 MG Cap Take 1 Cap by mouth every morning.     • bumetanide (BUMEX) 1 MG Tab Take 1 mg by mouth 2 times a day.     • apixaban (ELIQUIS) 5mg Tab Take 5 mg by mouth 2 Times a Day.     • escitalopram (LEXAPRO) 10 MG Tab Take 10 mg by mouth every day.     • levothyroxine (SYNTHROID) 50 MCG TABS Take 50 mcg by mouth Every morning on an empty stomach.     • aliskiren (TEKTURNA) 300 MG tablet Take 300 mg by mouth every day.     • allopurinol (ZYLOPRIM) 300 MG TABS Take 300 mg by mouth every morning.     • Multiple Vitamins-Minerals (MULTIVITAL PO) Take 1 Tab by mouth every day.     • GLUCOSAMINE MSM COMPLEX PO Take 1 Tab by mouth every day.     • [DISCONTINUED] aspirin EC (ECOTRIN) 81 MG Tablet Delayed Response Take 81 mg by mouth every morning.     • [DISCONTINUED] triazolam (HALCION) 0.125 MG tablet Take 0.125 mg by mouth at bedtime as needed. Indications: Trouble Sleeping     • [DISCONTINUED] simvastatin (ZOCOR) 40 MG TABS Take 40 mg by mouth every evening.       No facility-administered encounter medications on file as of 11/2/2017.      Review of Systems   Constitutional: Positive  "for malaise/fatigue. Negative for chills and fever.   HENT: Negative for congestion.    Eyes: Negative for blurred vision.   Respiratory: Positive for shortness of breath.    Cardiovascular: Negative for chest pain, palpitations, orthopnea, leg swelling and PND.   Gastrointestinal: Negative for abdominal pain.   Genitourinary: Negative for dysuria.   Musculoskeletal: Negative for joint pain and myalgias.   Skin: Negative for rash.   Neurological: Negative for dizziness and loss of consciousness.   Psychiatric/Behavioral: The patient does not have insomnia.         Objective:   BP (!) 172/90   Pulse 83   Ht 1.905 m (6' 3\")   Wt (!) 161 kg (355 lb)   SpO2 92%   BMI 44.37 kg/m²      Physical Exam   Constitutional: He is oriented to person, place, and time. He appears well-developed and well-nourished.   HENT:   Head: Normocephalic and atraumatic.   Eyes: Conjunctivae are normal. Pupils are equal, round, and reactive to light.   Neck: Normal range of motion. Neck supple.   Cardiovascular: Normal rate and regular rhythm.    Pulmonary/Chest: Effort normal and breath sounds normal.   Abdominal: Soft. Bowel sounds are normal.   Musculoskeletal: Normal range of motion. He exhibits no edema.   Neurological: He is alert and oriented to person, place, and time.   Skin: Skin is warm and dry.   Psychiatric: He has a normal mood and affect.     CARDIAC STUDIES/PROCEDURES:    ABDOMINAL ULTRASOUND (11/10/16)  Unremarkable abdominal aorta and IVC evaluation.    CARDIAC CATHETERIZATION CONCLUSIONS by Dr. Ureña (10/26/16)  Cardiac catheterization showing severe aortic stenosis, normal left ventricular systolic function, unremarkable epicardial coronary arteries.    CAROTID ULTRASOUND (12/28/15)  Carotid ultrasound showing mild plaques.    CT OF CHEST AND ABDOMEN (12/08/16)  1.  Aortic valve dimensions as given above. Aortic annulus is approximately 620 sq mm  2.  Coronary artery ostia are greater than 10 mm from the aortic " annulus.  3.  Severe atherosclerosis with tortuous common and external iliac arteries. Narrowing is most pronounced in the proximal common iliac arteries with a minimal diameter of 6.4 mm on the right and 6.3 mm on the left.    CT OF CHEST (10/18/16)  No evidence of pulmonary embolism.    ECHOCARDIOGRAM CONCLUSIONS (01/31/17)  Prior echo- 01/10/17  Moderate concentric left ventricular hypertrophy.   Normal left ventricular systolic function. Left ventricular ejection   fraction is visually estimated to be 65%.  Mildly dilated right ventricle.  Enlarged right atrium.   Mildly dilated left atrium  Moderate eccentric mitral regurgitation. ERO by PISA method is 0.24 sq cm.  Known TAVR aortic valve. Well seated with no paraalvular leak.     Vmax 2.5 m/sec, MG 14 mmHg, PG 31 mmHg.  Moderate tricuspid regurgitation.   Estimated right ventricular systolic pressure is 45 mmHg.   Mild to moderate pulmonary hypertension.    ECHOCARDIOGRAM CONCLUSIONS (01/10/17)  Normal left ventricular chamber size.  Left ventricular ejection fraction is visually estimated to be 65%.  Moderate concentric left ventricular hypertrophy.  Mild mitral stenosis.  Moderate mitral regurgitation.  Known TAVR aortic valve that is functioning normally with normal   transvalvular gradients.  Transvalvular gradients are - Peak: 19 mmHg, Mean: 12 mmHg.  No evidence of paravalvular leak noted.  Estimated right ventricular systolic pressure is 40 mmHg.    ECHOCARDIOGRAM CONCLUSIONS (10/19/16)  Echocardiogram showing normal left ventricular systolic function, ejection fraction of 55-60%, severe aortic stenosis with peak velocity of 4.4 m/s, peak gradient of 60 mmHg, mean gradient of 39 mmHg and calculated FRENCH of 0.7 cm2, moderate mitral regurgitation.    EKG performed on (02/01/17) was reviewed: EKG shows paced rhythm.  EKG performed on (12/06/16) EKG shows atrial fibrillation with non-specific intra-ventricular conduction delay.    Laboratory results of  (01/31/17) were reviewed. Bun of 20 mg/dl, creatinine levels of 0.95 mg/dl noted.    PULMONARY FUNCTION INTERPRETATION (11/15/16)  Mild chronic obstructive pulmonary disease    Assessment:     1. S/P TAVR (transcatheter aortic valve replacement)     2. Chronic diastolic congestive heart failure (CMS-HCC)     3. Ventricular tachycardia (CMS-[facility])     4. ICD (implantable cardioverter-defibrillator) in place     5. Atrial fibrillation, unspecified type (CMS-HCC)     6. History of pulmonary embolism     7. TAZ (obstructive sleep apnea)     8. Other emphysema (CMS-[facility])       Medical Decision Making:  Today's Assessment / Status / Plan:     1. Successful transcatheter aortic valve replacement (TAVR) with #29 Trent Douglas S3 valve, transfemoral approach under general anesthesia (01/09/17). He is clinically doing well, NYHA class I. We will repeat an echocardiogram at [facility] next March when he returns from [place].  2. Chronic diastolic congestive heart failure: The overall volume status is adequate.  3. Ventricular tachycardia with Sidecar[employer]me dual chamber AICD by Dr. Blankenship (01/31/17) and chronic amiodarone therapy: No recurrence of ventricular tachycardia noted nor ICD activity reported.  4. Atrial fibrillation on anticoagulation therapy (Eliquis): He is doing well on anticoagulation and the ventricular rate is well controlled.  5. History of deep venous thrombosis and saddle pulmonary embolism (2006) with intolerance to warfarin: He is clinically doing well.  6. COPD on chronic oxygen therapy (2.5 L/min)     We will follow up with him in March from TAVR standpoint and also have him follow up with his primary cardiologist, Dr. Ureña.    CC Taiwo Pyle and Reanna Schultz Recipients

## 2017-11-02 NOTE — PROGRESS NOTES
"Subjective:   Praveen Khalil is a 67 y.o. male who presents today for TAVR follow up.    Since the patient's last visit on 02/14/17, he has been doing well clinically. He admits to mild fatigue and shortness of breath. He denies chest pain, dizziness or syncope. He has been walking more. He is moving to [place] for this winter.    Past Medical History:   Diagnosis Date   • Anesthesia     \"Apnea at times with deep sedation\"    • Aortic stenosis     TAVR 1/9/17   • Arthritis     osteo- hands   • Atrial fibrillation (CMS-HCC)     A Fib    • Cataract     bilat IOL    • Congestive heart failure (CMS-[facility])    • Disorder of thyroid    • Gout    • Hypertension    • Kidney stone    • Psychiatric problem     depression, anxiety    • Pulmonary embolism (CMS-[facility])    • Sleep apnea     BiPAP with oxygen 2.5 L     Past Surgical History:   Procedure Laterality Date   • TRANSCATHETER AORTIC VALVE REPLACEMENT  1/9/2017    Procedure: TRANSCATHETER AORTIC VALVE REPLACEMENT WITH SCOUT;  Surgeon: Sidney Laguna M.D.;  Location: SURGERY [facility];  Service:    • DUPUYTREN CONTRACTURE RELEASE Right 2014   • HIP ARTHROPLASTY TOTAL  10/22/2013    Performed by Nader Barr M.D. at SURGERY [facility]   • LUMBAR LAMINECTOMY DISKECTOMY  2007   • CATARACT EXTRACTION WITH IOL     • TONSILLECTOMY       Family History   Problem Relation Age of Onset   • No Known Problems Mother    • No Known Problems Father      History   Smoking Status   • Former Smoker   • Packs/day: 0.75   • Years: 40.00   • Types: Cigarettes, Pipe   • Quit date: 3/1/2007   Smokeless Tobacco   • Never Used     Comment: quit in march 2007     Allergies   Allergen Reactions   • Nkda [No Known Drug Allergy]      Medications reviewed.    Outpatient Encounter Prescriptions as of 11/2/2017   Medication Sig Dispense Refill   • Rosuvastatin Calcium (CRESTOR PO) Take  by mouth.     • potassium chloride SA (KDUR) 20 MEQ Tab CR Take 1 Tab by mouth 2 times a day. 180 Tab " 3   • amiodarone (PACERONE) 400 MG tablet One tab by mouth twice daily until Feb 10; on that day change to one pill daily (Patient taking differently: 100 mg. One tab by mouth twice daily until Feb 10; on that day change to one pill daily) 40 Tab 0   • calcium carbonate-vitamin D (OSCAL 500/200 D-3) 500-200 MG-UNIT Tab Take 2 Tabs by mouth every morning with breakfast.     • metoprolol (LOPRESSOR) 50 MG Tab Take 75 mg by mouth 2 times a day.     • Cholecalciferol (VITAMIN D) 2000 UNIT Tab Take 2,000 Units by mouth every morning.     • Coenzyme Q10 (CO Q-10 MAXIMUM STRENGTH) 400 MG Cap Take 1 Cap by mouth every morning.     • bumetanide (BUMEX) 1 MG Tab Take 1 mg by mouth 2 times a day.     • apixaban (ELIQUIS) 5mg Tab Take 5 mg by mouth 2 Times a Day.     • escitalopram (LEXAPRO) 10 MG Tab Take 10 mg by mouth every day.     • levothyroxine (SYNTHROID) 50 MCG TABS Take 50 mcg by mouth Every morning on an empty stomach.     • aliskiren (TEKTURNA) 300 MG tablet Take 300 mg by mouth every day.     • allopurinol (ZYLOPRIM) 300 MG TABS Take 300 mg by mouth every morning.     • Multiple Vitamins-Minerals (MULTIVITAL PO) Take 1 Tab by mouth every day.     • GLUCOSAMINE MSM COMPLEX PO Take 1 Tab by mouth every day.     • [DISCONTINUED] aspirin EC (ECOTRIN) 81 MG Tablet Delayed Response Take 81 mg by mouth every morning.     • [DISCONTINUED] triazolam (HALCION) 0.125 MG tablet Take 0.125 mg by mouth at bedtime as needed. Indications: Trouble Sleeping     • [DISCONTINUED] simvastatin (ZOCOR) 40 MG TABS Take 40 mg by mouth every evening.       No facility-administered encounter medications on file as of 11/2/2017.      Review of Systems   Constitutional: Positive for malaise/fatigue. Negative for chills and fever.   HENT: Negative for congestion.    Eyes: Negative for blurred vision.   Respiratory: Positive for shortness of breath.    Cardiovascular: Negative for chest pain, palpitations, orthopnea, leg swelling and PND.  "  Gastrointestinal: Negative for abdominal pain.   Genitourinary: Negative for dysuria.   Musculoskeletal: Negative for joint pain and myalgias.   Skin: Negative for rash.   Neurological: Negative for dizziness and loss of consciousness.   Psychiatric/Behavioral: The patient does not have insomnia.         Objective:   BP (!) 172/90   Pulse 83   Ht 1.905 m (6' 3\")   Wt (!) 161 kg (355 lb)   SpO2 92%   BMI 44.37 kg/m²     Physical Exam   Constitutional: He is oriented to person, place, and time. He appears well-developed and well-nourished.   HENT:   Head: Normocephalic and atraumatic.   Eyes: Conjunctivae are normal. Pupils are equal, round, and reactive to light.   Neck: Normal range of motion. Neck supple.   Cardiovascular: Normal rate and regular rhythm.    Pulmonary/Chest: Effort normal and breath sounds normal.   Abdominal: Soft. Bowel sounds are normal.   Musculoskeletal: Normal range of motion. He exhibits no edema.   Neurological: He is alert and oriented to person, place, and time.   Skin: Skin is warm and dry.   Psychiatric: He has a normal mood and affect.     CARDIAC STUDIES/PROCEDURES:    ABDOMINAL ULTRASOUND (11/10/16)  Unremarkable abdominal aorta and IVC evaluation.    CARDIAC CATHETERIZATION CONCLUSIONS by Dr. Ureña (10/26/16)  Cardiac catheterization showing severe aortic stenosis, normal left ventricular systolic function, unremarkable epicardial coronary arteries.    CAROTID ULTRASOUND (12/28/15)  Carotid ultrasound showing mild plaques.    CT OF CHEST AND ABDOMEN (12/08/16)  1.  Aortic valve dimensions as given above. Aortic annulus is approximately 620 sq mm  2.  Coronary artery ostia are greater than 10 mm from the aortic annulus.  3.  Severe atherosclerosis with tortuous common and external iliac arteries. Narrowing is most pronounced in the proximal common iliac arteries with a minimal diameter of 6.4 mm on the right and 6.3 mm on the left.    CT OF CHEST (10/18/16)  No evidence of " pulmonary embolism.    ECHOCARDIOGRAM CONCLUSIONS (01/31/17)  Prior echo- 01/10/17  Moderate concentric left ventricular hypertrophy.   Normal left ventricular systolic function. Left ventricular ejection   fraction is visually estimated to be 65%.  Mildly dilated right ventricle.  Enlarged right atrium.   Mildly dilated left atrium  Moderate eccentric mitral regurgitation. ERO by PISA method is 0.24 sq cm.  Known TAVR aortic valve. Well seated with no paraalvular leak.     Vmax 2.5 m/sec, MG 14 mmHg, PG 31 mmHg.  Moderate tricuspid regurgitation.   Estimated right ventricular systolic pressure is 45 mmHg.   Mild to moderate pulmonary hypertension.    ECHOCARDIOGRAM CONCLUSIONS (01/10/17)  Normal left ventricular chamber size.  Left ventricular ejection fraction is visually estimated to be 65%.  Moderate concentric left ventricular hypertrophy.  Mild mitral stenosis.  Moderate mitral regurgitation.  Known TAVR aortic valve that is functioning normally with normal   transvalvular gradients.  Transvalvular gradients are - Peak: 19 mmHg, Mean: 12 mmHg.  No evidence of paravalvular leak noted.  Estimated right ventricular systolic pressure is 40 mmHg.    ECHOCARDIOGRAM CONCLUSIONS (10/19/16)  Echocardiogram showing normal left ventricular systolic function, ejection fraction of 55-60%, severe aortic stenosis with peak velocity of 4.4 m/s, peak gradient of 60 mmHg, mean gradient of 39 mmHg and calculated FRENCH of 0.7 cm2, moderate mitral regurgitation.    EKG performed on (02/01/17) was reviewed: EKG shows paced rhythm.  EKG performed on (12/06/16) EKG shows atrial fibrillation with non-specific intra-ventricular conduction delay.    Laboratory results of (01/31/17) were reviewed. Bun of 20 mg/dl, creatinine levels of 0.95 mg/dl noted.    PULMONARY FUNCTION INTERPRETATION (11/15/16)  Mild chronic obstructive pulmonary disease    Assessment:     1. S/P TAVR (transcatheter aortic valve replacement)     2. Chronic diastolic  congestive heart failure (CMS-[facility])     3. Ventricular tachycardia (CMS-[facility])     4. ICD (implantable cardioverter-defibrillator) in place     5. Atrial fibrillation, unspecified type (CMS-[facility])     6. History of pulmonary embolism     7. TAZ (obstructive sleep apnea)     8. Other emphysema (CMS-[facility])       Medical Decision Making:  Today's Assessment / Status / Plan:     1. Successful transcatheter aortic valve replacement (TAVR) with #29 Trent Douglas S3 valve, transfemoral approach under general anesthesia (01/09/17). He is clinically doing well, NYHA class I. We will repeat an echocardiogram at [facility] next March when he returns from [place].  2. Chronic diastolic congestive heart failure: The overall volume status is adequate.  3. Ventricular tachycardia with [employer] dual chamber AICD by Dr. Blankenship (01/31/17) and chronic amiodarone therapy: No recurrence of ventricular tachycardia noted nor ICD activity reported.  4. Atrial fibrillation on anticoagulation therapy (Eliquis): He is doing well on anticoagulation and the ventricular rate is well controlled.  5. History of deep venous thrombosis and saddle pulmonary embolism (2006) with intolerance to warfarin: He is clinically doing well.  6. COPD on chronic oxygen therapy (2.5 L/min)     We will follow up with him in March from TAVR standpoint and also have him follow up with his primary cardiologist, Dr. Ureña.    CC Taiwo Pyle and Reanna Schultz

## 2018-06-01 ENCOUNTER — APPOINTMENT (OUTPATIENT)
Dept: RADIOLOGY | Facility: MEDICAL CENTER | Age: 68
End: 2018-06-01
Attending: EMERGENCY MEDICINE
Payer: MEDICARE

## 2018-06-01 ENCOUNTER — HOSPITAL ENCOUNTER (EMERGENCY)
Facility: MEDICAL CENTER | Age: 68
End: 2018-06-01
Attending: EMERGENCY MEDICINE
Payer: MEDICARE

## 2018-06-01 VITALS
DIASTOLIC BLOOD PRESSURE: 61 MMHG | WEIGHT: 300 LBS | HEART RATE: 82 BPM | BODY MASS INDEX: 37.3 KG/M2 | SYSTOLIC BLOOD PRESSURE: 108 MMHG | TEMPERATURE: 98 F | HEIGHT: 75 IN | OXYGEN SATURATION: 91 % | RESPIRATION RATE: 18 BRPM

## 2018-06-01 DIAGNOSIS — S82.441A CLOSED DISPLACED SPIRAL FRACTURE OF SHAFT OF RIGHT FIBULA, INITIAL ENCOUNTER: ICD-10-CM

## 2018-06-01 DIAGNOSIS — S93.491A HIGH ANKLE SPRAIN OF RIGHT LOWER EXTREMITY, INITIAL ENCOUNTER: ICD-10-CM

## 2018-06-01 PROCEDURE — 29515 APPLICATION SHORT LEG SPLINT: CPT

## 2018-06-01 PROCEDURE — 99284 EMERGENCY DEPT VISIT MOD MDM: CPT

## 2018-06-01 PROCEDURE — 302875 HCHG BANDAGE ACE 4 OR 6""

## 2018-06-01 PROCEDURE — 73610 X-RAY EXAM OF ANKLE: CPT | Mod: RT

## 2018-06-01 RX ORDER — MORPHINE SULFATE 10 MG/ML
6 INJECTION, SOLUTION INTRAMUSCULAR; INTRAVENOUS ONCE
Status: DISCONTINUED | OUTPATIENT
Start: 2018-06-01 | End: 2018-06-01 | Stop reason: HOSPADM

## 2018-06-01 RX ORDER — ASPIRIN 81 MG/1
81 TABLET, CHEWABLE ORAL DAILY
COMMUNITY

## 2018-06-01 RX ORDER — ONDANSETRON 2 MG/ML
4 INJECTION INTRAMUSCULAR; INTRAVENOUS ONCE
Status: DISCONTINUED | OUTPATIENT
Start: 2018-06-01 | End: 2018-06-01 | Stop reason: HOSPADM

## 2018-06-01 RX ORDER — TRAMADOL HYDROCHLORIDE 50 MG/1
50 TABLET ORAL EVERY 6 HOURS PRN
Qty: 24 TAB | Refills: 0 | Status: SHIPPED | OUTPATIENT
Start: 2018-06-01 | End: 2018-06-05

## 2018-06-01 ASSESSMENT — PAIN SCALES - GENERAL: PAINLEVEL_OUTOF10: 4

## 2018-06-02 ENCOUNTER — PATIENT OUTREACH (OUTPATIENT)
Dept: HEALTH INFORMATION MANAGEMENT | Facility: OTHER | Age: 68
End: 2018-06-02

## 2018-06-02 NOTE — DISCHARGE INSTRUCTIONS
Cast or Splint Care  Casts and splints support injured limbs and keep bones from moving while they heal.   HOME CARE  · Keep the cast or splint uncovered during the drying period.  ¨ A plaster cast can take 24 to 48 hours to dry.  ¨ A fiberglass cast will dry in less than 1 hour.  · Do not rest the cast on anything harder than a pillow for 24 hours.  · Do not put weight on your injured limb. Do not put pressure on the cast. Wait for your doctor's approval.  · Keep the cast or splint dry.  ¨ Cover the cast or splint with a plastic bag during baths or wet weather.  ¨ If you have a cast over your chest and belly (trunk), take sponge baths until the cast is taken off.  ¨ If your cast gets wet, dry it with a towel or blow dryer. Use the cool setting on the blow dryer.  · Keep your cast or splint clean. Wash a dirty cast with a damp cloth.  · Do not put any objects under your cast or splint.  · Do not scratch the skin under the cast with an object. If itching is a problem, use a blow dryer on a cool setting over the itchy area.  · Do not trim or cut your cast.  · Do not take out the padding from inside your cast.  · Exercise your joints near the cast as told by your doctor.  · Raise (elevate) your injured limb on 1 or 2 pillows for the first 1 to 3 days.  GET HELP IF:  · Your cast or splint cracks.  · Your cast or splint is too tight or too loose.  · You itch badly under the cast.  · Your cast gets wet or has a soft spot.  · You have a bad smell coming from the cast.  · You get an object stuck under the cast.  · Your skin around the cast becomes red or sore.  · You have new or more pain after the cast is put on.  GET HELP RIGHT AWAY IF:  · You have fluid leaking through the cast.  · You cannot move your fingers or toes.  · Your fingers or toes turn blue or white or are cool, painful, or puffy (swollen).  · You have tingling or lose feeling (numbness) around the injured area.  · You have bad pain or pressure under the  cast.  · You have trouble breathing or have shortness of breath.  · You have chest pain.     This information is not intended to replace advice given to you by your health care provider. Make sure you discuss any questions you have with your health care provider.     Document Released: 04/18/2012 Document Revised: 08/20/2014 Document Reviewed: 06/26/2014  [employer] Patient Education ©2016 [employer]

## 2018-06-02 NOTE — ED PROVIDER NOTES
"ED Provider Note    CHIEF COMPLAINT  Chief Complaint   Patient presents with   • Foot Pain   • Sent from Urgent Care       HPI  Praveen Khalil is a 67 y.o. Male with chronic right drop foot who presents after tripping over that right foot while carrying a box and rolling his right ankle underneath him. He was able to bear weight, but it was painful and he felt like he had no strength in the foot. He was able to student side and call for help. It's swollen and painful. He denies numbness or tearing. There was no bleeding.    REVIEW OF SYSTEMS  See HPI for further details. No numbness or tingling, bleeding    PAST MEDICAL HISTORY  Past Medical History:   Diagnosis Date   • Anesthesia     \"Apnea at times with deep sedation\"    • Aortic stenosis     TAVR 1/9/17   • Arthritis     osteo- hands   • Atrial fibrillation (CMS-HCC) ([facility])     A Fib    • Cataract     bilat IOL    • Congestive heart failure (CMS-HCC) ([facility])    • Disorder of thyroid    • Gout    • Hypertension    • Kidney stone    • Psychiatric problem     depression, anxiety    • Pulmonary embolism (CMS-HCC) ([facility])    • Sleep apnea     BiPAP with oxygen 2.5 L       FAMILY HISTORY  Family History   Problem Relation Age of Onset   • No Known Problems Mother    • No Known Problems Father        SOCIAL HISTORY  Social History     Social History   • Marital status:      Spouse name: N/A   • Number of children: N/A   • Years of education: N/A     Social History Main Topics   • Smoking status: Former Smoker     Packs/day: 0.75     Years: 40.00     Types: Cigarettes, Pipe     Quit date: 3/1/2007   • Smokeless tobacco: Never Used      Comment: quit in march 2007   • Alcohol use No      Comment: occ.   • Drug use: No   • Sexual activity: Not on file     Other Topics Concern   • Not on file     Social History Narrative   • No narrative on file       SURGICAL HISTORY  Past Surgical History:   Procedure Laterality Date   • TRANSCATHETER AORTIC VALVE REPLACEMENT  " "1/9/2017    Procedure: TRANSCATHETER AORTIC VALVE REPLACEMENT WITH SCOUT;  Surgeon: Sidney Laguna M.D.;  Location: SURGERY [facility];  Service:    • DUPUYTREN CONTRACTURE RELEASE Right 2014   • HIP ARTHROPLASTY TOTAL  10/22/2013    Performed by Nader Barr M.D. at SURGERY [facility]   • LUMBAR LAMINECTOMY DISKECTOMY  2007   • CATARACT EXTRACTION WITH IOL     • TONSILLECTOMY         CURRENT MEDICATIONS  Home Medications     Reviewed by Margarito Mayers R.N. (Registered Nurse) on 06/01/18 at 1729  Med List Status: Partial   Medication Last Dose Status   aliskiren (TEKTURNA) 300 MG tablet 11/2/2017 Active   allopurinol (ZYLOPRIM) 300 MG TABS 6/1/2018 Active   amiodarone (PACERONE) 400 MG tablet 6/1/2018 Active   aspirin (ASA) 81 MG Chew Tab chewable tablet 6/1/2018 Active   bumetanide (BUMEX) 1 MG Tab 5/31/2018 Active   calcium carbonate-vitamin D (OSCAL 500/200 D-3) 500-200 MG-UNIT Tab 6/1/2018 Active   Cholecalciferol (VITAMIN D) 2000 UNIT Tab 6/1/2018 Active   Coenzyme Q10 (CO Q-10 MAXIMUM STRENGTH) 400 MG Cap 6/1/2018 Active   escitalopram (LEXAPRO) 10 MG Tab 6/1/2018 Active   GLUCOSAMINE MSM COMPLEX PO 6/1/2018 Active   levothyroxine (SYNTHROID) 50 MCG TABS 6/1/2018 Active   metoprolol (LOPRESSOR) 50 MG Tab 11/2/2017 Active   Multiple Vitamins-Minerals (MULTIVITAL PO) 6/1/2018 Active   potassium chloride SA (KDUR) 20 MEQ Tab CR 6/1/2018 Active   Rosuvastatin Calcium (CRESTOR PO) 11/2/2017 Active                ALLERGIES  Allergies   Allergen Reactions   • Nkda [No Known Drug Allergy]        PHYSICAL EXAM  VITAL SIGNS: /61   Pulse 80   Temp 36.7 °C (98 °F)   Resp 18   Ht 1.905 m (6' 3\") Comment: Stated  Wt (!) 136.1 kg (300 lb)   SpO2 94%   BMI 37.50 kg/m²  @ARASELI[[id number]::@   Constitutional: Well developed, morbidly obese, No acute respiratory distress, Non-toxic appearance.   HENT: Normocephalic, Atraumatic, Bilateral external ears normal, Oropharynx clear, mucous membranes are moist.  Eyes: " Conjunctiva normal, No discharge. No icterus.  Neck: Normal range of motion. Supple.  Skin: Warm, Dry, No erythema, No rash. Intact. Skin slightly mottled to the right foot   Musculoskeletal: No tenderness to the right knee. Achilles is intact. No tenderness to the base of the 5th metatarsal. Large ankle effusion on the right. He is able to wiggle his toes. Tenderness bilateral malleoli  Neurologic: Alert & oriented x 3. No focal deficits noted. Sensations intact to the right foot     DX-ANKLE 3+ VIEWS RIGHT   Final Result      1.  Displaced oblique fracture of distal RIGHT fibula with widening of the mortise medially suggesting ligamentous injury.   2.  Diffuse soft tissue swelling, primarily at the anterior and medial aspect of the ankle.   3.  Degenerative change of the ankle and midfoot.            COURSE & MEDICAL DECISION MAKING  Pertinent Labs & Imaging studies reviewed. (See chart for details)  Patient comfortable at rest with cryotherapy.    6:39 PM Page out for CHIDI.    7:10 PM I spoke with Dr. Shah, orthopedist. He states the patient needs to be splinted, nonweightbearing, and see him on clinic on Tuesday. Patient is thrilled about that because he does not want urgent surgery. Patient got IM morphine and Zofran for pain control during splint placement. Patient is neurologically intact before and after stirrup placement. He already has a scooter so that he can be nonweightbearing.    I reviewed prescription monitoring program for patient's narcotic use before prescribing a scheduled drug.The patient will not drink alcohol nor drive with prescribed medications. The patient will return for new or worsening symptoms and is stable at the time of discharge.    The patient is referred to a primary physician for blood pressure management, diabetic screening, and for all other preventative health concerns.    In prescribing controlled substances to this patient, I certify that I have obtained and reviewed the  medical history of Praveen Khalil. I have also made a good leanne effort to obtain applicable records from other providers who have treated the patient and records demonstrating the following: no significant opiate abuse.     I have conducted a physical exam and documented it. I have reviewed Mr. Khalil’s prescription history as maintained by the [place] Prescription Monitoring Program.     I have assessed the patient’s risk for abuse, dependency, and addiction using the validated Opioid Risk Tool available at https://www.mdcalc.com/oqfylk-tmdh-nhly-ort-narcotic-abuse.     Given the above, I believe the benefits of controlled substance therapy outweigh the risks. The reasons for prescribing controlled substances include in my professional opinion, controlled substances are the only reasonable choice for this patient because fracture and need to avoid NSAIDs. Accordingly, I have discussed the risk and benefits, treatment plan, and alternative therapies with the patient.         DISPOSITION:  Patient will be discharged home in stable condition.    FOLLOW UP:  James Shah M.D.  [address] [facility] [address]  [phone]    Call  Monday morning for appt on Tuesday      OUTPATIENT MEDICATIONS:  New Prescriptions    TRAMADOL (ULTRAM) 50 MG TAB    Take 1 Tab by mouth every 6 hours as needed for Severe Pain for up to 4 days.         FINAL IMPRESSION  1. High ankle sprain of right lower extremity, initial encounter    2. Closed displaced spiral fracture of shaft of right fibula, initial encounter               Electronically signed by: Ericka Romano, 6/1/2018 6:37 PM

## 2018-06-02 NOTE — ED NOTES
Report received from Lizette ARRIOLA.  Assumed patient care. Pt assesement done.  Plan of care reviewed with patient.

## 2018-06-02 NOTE — ED NOTES
Discharge information reviewed in detail. Pt verbalized understanding of discharge instructions to follow up with PCP and to return to ER if condition worsens. Pt educated/expressed awareness of not driving or operating heavy machinery.   Patient educated no combining of alcohol with other sedating medications.   Patient educated on new prescriptions. Daughter to drive home.

## 2018-09-21 ENCOUNTER — APPOINTMENT (RX ONLY)
Dept: URBAN - METROPOLITAN AREA CLINIC 4 | Facility: CLINIC | Age: 68
Setting detail: DERMATOLOGY
End: 2018-09-21

## 2018-09-21 DIAGNOSIS — L82.1 OTHER SEBORRHEIC KERATOSIS: ICD-10-CM

## 2018-09-21 DIAGNOSIS — L57.0 ACTINIC KERATOSIS: ICD-10-CM

## 2018-09-21 DIAGNOSIS — L91.8 OTHER HYPERTROPHIC DISORDERS OF THE SKIN: ICD-10-CM

## 2018-09-21 DIAGNOSIS — L81.4 OTHER MELANIN HYPERPIGMENTATION: ICD-10-CM

## 2018-09-21 DIAGNOSIS — D18.0 HEMANGIOMA: ICD-10-CM

## 2018-09-21 DIAGNOSIS — D22 MELANOCYTIC NEVI: ICD-10-CM

## 2018-09-21 DIAGNOSIS — L21.8 OTHER SEBORRHEIC DERMATITIS: ICD-10-CM

## 2018-09-21 PROBLEM — D22.61 MELANOCYTIC NEVI OF RIGHT UPPER LIMB, INCLUDING SHOULDER: Status: ACTIVE | Noted: 2018-09-21

## 2018-09-21 PROBLEM — D22.72 MELANOCYTIC NEVI OF LEFT LOWER LIMB, INCLUDING HIP: Status: ACTIVE | Noted: 2018-09-21

## 2018-09-21 PROBLEM — D22.71 MELANOCYTIC NEVI OF RIGHT LOWER LIMB, INCLUDING HIP: Status: ACTIVE | Noted: 2018-09-21

## 2018-09-21 PROBLEM — D22.62 MELANOCYTIC NEVI OF LEFT UPPER LIMB, INCLUDING SHOULDER: Status: ACTIVE | Noted: 2018-09-21

## 2018-09-21 PROBLEM — D18.01 HEMANGIOMA OF SKIN AND SUBCUTANEOUS TISSUE: Status: ACTIVE | Noted: 2018-09-21

## 2018-09-21 PROBLEM — D22.5 MELANOCYTIC NEVI OF TRUNK: Status: ACTIVE | Noted: 2018-09-21

## 2018-09-21 PROCEDURE — ? COUNSELING

## 2018-09-21 PROCEDURE — 99214 OFFICE O/P EST MOD 30 MIN: CPT

## 2018-09-21 PROCEDURE — ? LIQUID NITROGEN

## 2018-09-21 ASSESSMENT — LOCATION DETAILED DESCRIPTION DERM
LOCATION DETAILED: RIGHT VENTRAL PROXIMAL FOREARM
LOCATION DETAILED: LEFT SUPERIOR MEDIAL MIDBACK
LOCATION DETAILED: RIGHT ANTERIOR DISTAL UPPER ARM
LOCATION DETAILED: RIGHT INFERIOR CENTRAL MALAR CHEEK
LOCATION DETAILED: LEFT POPLITEAL SKIN
LOCATION DETAILED: RIGHT SUPERIOR HELIX
LOCATION DETAILED: RIGHT SUPERIOR PARIETAL SCALP
LOCATION DETAILED: LEFT INFERIOR CENTRAL MALAR CHEEK
LOCATION DETAILED: RIGHT PROXIMAL CALF
LOCATION DETAILED: RIGHT MEDIAL FRONTAL SCALP
LOCATION DETAILED: LEFT SCAPHA
LOCATION DETAILED: RIGHT CLAVICULAR NECK
LOCATION DETAILED: LEFT CENTRAL EYEBROW
LOCATION DETAILED: RIGHT VENTRAL DISTAL FOREARM
LOCATION DETAILED: RIGHT DISTAL POSTERIOR THIGH
LOCATION DETAILED: RIGHT POPLITEAL SKIN
LOCATION DETAILED: EPIGASTRIC SKIN
LOCATION DETAILED: LEFT ANTECUBITAL SKIN
LOCATION DETAILED: RIGHT INFERIOR UPPER BACK
LOCATION DETAILED: RIGHT MEDIAL UPPER BACK
LOCATION DETAILED: LEFT VENTRAL DISTAL FOREARM
LOCATION DETAILED: LEFT PROXIMAL CALF
LOCATION DETAILED: LEFT ANTERIOR DISTAL UPPER ARM
LOCATION DETAILED: RIGHT INFERIOR MEDIAL MIDBACK
LOCATION DETAILED: RIGHT RADIAL DORSAL HAND
LOCATION DETAILED: LEFT RADIAL DORSAL HAND
LOCATION DETAILED: LEFT VENTRAL DISTAL FOREARM
LOCATION DETAILED: LEFT CENTRAL FRONTAL SCALP
LOCATION DETAILED: RIGHT MEDIAL INFERIOR CHEST
LOCATION DETAILED: LEFT CENTRAL MALAR CHEEK
LOCATION DETAILED: RIGHT ANTERIOR PROXIMAL UPPER ARM
LOCATION DETAILED: PERIUMBILICAL SKIN
LOCATION DETAILED: LEFT CLAVICULAR NECK
LOCATION DETAILED: XIPHOID
LOCATION DETAILED: RIGHT CENTRAL EYEBROW
LOCATION DETAILED: LEFT DISTAL POSTERIOR THIGH
LOCATION DETAILED: LEFT MEDIAL FOREHEAD

## 2018-09-21 ASSESSMENT — LOCATION SIMPLE DESCRIPTION DERM
LOCATION SIMPLE: LEFT ELBOW
LOCATION SIMPLE: RIGHT POPLITEAL SKIN
LOCATION SIMPLE: LEFT CHEEK
LOCATION SIMPLE: LEFT ANTERIOR NECK
LOCATION SIMPLE: RIGHT POSTERIOR THIGH
LOCATION SIMPLE: RIGHT UPPER ARM
LOCATION SIMPLE: LEFT CALF
LOCATION SIMPLE: LEFT EYEBROW
LOCATION SIMPLE: LEFT HAND
LOCATION SIMPLE: LEFT FOREHEAD
LOCATION SIMPLE: ABDOMEN
LOCATION SIMPLE: LEFT UPPER ARM
LOCATION SIMPLE: RIGHT SCALP
LOCATION SIMPLE: RIGHT CHEEK
LOCATION SIMPLE: CHEST
LOCATION SIMPLE: RIGHT ANTERIOR NECK
LOCATION SIMPLE: SCALP
LOCATION SIMPLE: RIGHT LOWER BACK
LOCATION SIMPLE: LEFT FOREARM
LOCATION SIMPLE: RIGHT HAND
LOCATION SIMPLE: RIGHT EYEBROW
LOCATION SIMPLE: LEFT POPLITEAL SKIN
LOCATION SIMPLE: LEFT POSTERIOR THIGH
LOCATION SIMPLE: RIGHT CALF
LOCATION SIMPLE: RIGHT UPPER BACK
LOCATION SIMPLE: LEFT FOREARM
LOCATION SIMPLE: LEFT EAR
LOCATION SIMPLE: RIGHT FOREARM
LOCATION SIMPLE: LEFT LOWER BACK
LOCATION SIMPLE: RIGHT EAR

## 2018-09-21 ASSESSMENT — LOCATION ZONE DERM
LOCATION ZONE: FACE
LOCATION ZONE: NECK
LOCATION ZONE: EAR
LOCATION ZONE: HAND
LOCATION ZONE: LEG
LOCATION ZONE: SCALP
LOCATION ZONE: TRUNK
LOCATION ZONE: ARM
LOCATION ZONE: ARM

## 2018-09-21 NOTE — PROCEDURE: LIQUID NITROGEN
Bill Insurance (You Assume Risk Of Denial Or Audit By Selecting Yes): No
Consent: The patient's consent was obtained including but not limited to risks of crusting, scabbing, blistering, scarring, darker or lighter pigmentary change, recurrence, incomplete removal and infection.
Medical Necessity Information: It is in your best interest to select a reason for this procedure from the list below. All of these items fulfill various CMS LCD requirements except the new and changing color options.
Detail Level: Detailed
Post-Care Instructions: I reviewed with the patient in detail post-care instructions. Patient is to wear sunprotection, and avoid picking at any of the treated lesions. Pt may apply Vaseline to crusted or scabbing areas.
Duration Of Freeze Thaw-Cycle (Seconds): 0
Medical Necessity Clause: This procedure was medically necessary because the lesions that were treated were:  If lesion does not resolve, bx is needed.

## 2018-10-15 ENCOUNTER — OFFICE VISIT (OUTPATIENT)
Dept: PULMONOLOGY | Facility: HOSPICE | Age: 68
End: 2018-10-15
Payer: MEDICARE

## 2018-10-15 VITALS
OXYGEN SATURATION: 92 % | RESPIRATION RATE: 16 BRPM | HEIGHT: 75 IN | HEART RATE: 70 BPM | SYSTOLIC BLOOD PRESSURE: 136 MMHG | DIASTOLIC BLOOD PRESSURE: 86 MMHG | TEMPERATURE: 97.3 F

## 2018-10-15 DIAGNOSIS — J44.9 COPD, MILD (HCC): ICD-10-CM

## 2018-10-15 DIAGNOSIS — G47.33 OSA (OBSTRUCTIVE SLEEP APNEA): ICD-10-CM

## 2018-10-15 PROCEDURE — 99213 OFFICE O/P EST LOW 20 MIN: CPT | Performed by: PHYSICIAN ASSISTANT

## 2018-10-15 NOTE — PATIENT INSTRUCTIONS
1-follow up with sleep medicine at [facility] or [facility] who is following him also  2-updated script for BiPap supplies  3-no follow up needed

## 2018-10-16 NOTE — PROGRESS NOTES
"CC    HPI:  Praveen Khalil is a 68 y.o. year old male here today for follow-up on mild COPD.  Patient with history of TAZ but forgot his compliance card today.  Some confusion on follow up as he sees [place] [facility].  Patient had PFTs in late 2017 early 2018 results not currently available.  Discussed follow-up with sleep center on [facility] or alternatively [place] [facility] may be able to follow him for both his COPD and TAZ.  Patient will research further, welcome to continue being seen at this practice.  States has done well since his Taver's procedure in January 2017 with exception of an episode of ventricular tachyarrhythmia for which patient now has defibrillator.  Currently his home regimen is limited to a BiPAP with 2 L O2 bleed in and as needed rescue inhaler which he states he has not required.  Reviewed vitals in office including blood pressure of 136/86, O2 sat of 92%, last recorded BMI of 37.5.  Patient uses a motorized scooter due to limited mobility. Sent prescription for replacement Bipap supplies per patient request.     ROS:     Constitutional: Denies fever, chills, unintentional weight loss, fatigue  Skin: Denies rashes, hair or nail changes, lumps, sores  Eyes: Wears reading glasses status post cataract surgery  ENT: Denies earaches, nasal congestion, runny nose, sore throat, sore tongue, hoarseness  GI: Denies heartburn, reflux, trouble swallowing  Respiratory: Denies cough, wheeze, shortness of breath  CV: Denies chest pain, pressure, tightness, palpitations, patient does have bilateral lower extremity edema more pronounced on the right for which she wears compression hose, sleeps on 2 pillows    Past Medical History:   Diagnosis Date   • Anesthesia     \"Apnea at times with deep sedation\"    • Aortic stenosis     TAVR 1/9/17   • Arthritis     osteo- hands   • Atrial fibrillation (HCC)     A Fib    • Cataract     bilat IOL    • Congestive heart failure (HCC)    • Disorder of " "thyroid    • Gout    • Hypertension    • Kidney stone    • Psychiatric problem     depression, anxiety    • Pulmonary embolism (HCC)    • Sleep apnea     BiPAP with oxygen 2.5 L       Past Surgical History:   Procedure Laterality Date   • TRANSCATHETER AORTIC VALVE REPLACEMENT  1/9/2017    Procedure: TRANSCATHETER AORTIC VALVE REPLACEMENT WITH SCOUT;  Surgeon: Sidney Laguna M.D.;  Location: SURGERY [facility];  Service:    • DUPUYTREN CONTRACTURE RELEASE Right 2014   • HIP ARTHROPLASTY TOTAL  10/22/2013    Performed by Nader Barr M.D. at SURGERY [facility]   • LUMBAR LAMINECTOMY DISKECTOMY  2007   • CATARACT EXTRACTION WITH IOL     • TONSILLECTOMY         Family History   Problem Relation Age of Onset   • No Known Problems Mother    • No Known Problems Father        Social History     Social History   • Marital status:      Spouse name: N/A   • Number of children: N/A   • Years of education: N/A     Occupational History   • Not on file.     Social History Main Topics   • Smoking status: Former Smoker     Packs/day: 0.75     Years: 40.00     Types: Cigarettes, Pipe     Quit date: 3/1/2007   • Smokeless tobacco: Never Used      Comment: quit in march 2007   • Alcohol use No      Comment: occ.   • Drug use: No   • Sexual activity: Not on file     Other Topics Concern   • Not on file     Social History Narrative   • No narrative on file       Allergies as of 10/15/2018 - Reviewed 10/15/2018   Allergen Reaction Noted   • Nkda [no known drug allergy]  08/16/2007        Vital signs for this encounter:  Vitals:    10/15/18 1253   Height: 1.905 m (6' 3\")   BP: 136/86   Pulse: 70   Resp: 16   Temp: 36.3 °C (97.3 °F)       Current medications as of today   Current Outpatient Prescriptions   Medication Sig Dispense Refill   • aspirin (ASA) 81 MG Chew Tab chewable tablet Take 81 mg by mouth every day.     • Rosuvastatin Calcium (CRESTOR PO) Take  by mouth.     • potassium chloride SA (KDUR) 20 MEQ Tab CR Take " 1 Tab by mouth 2 times a day. 180 Tab 3   • amiodarone (PACERONE) 400 MG tablet One tab by mouth twice daily until Feb 10; on that day change to one pill daily (Patient taking differently: 100 mg. One tab by mouth twice daily until Feb 10; on that day change to one pill daily) 40 Tab 0   • calcium carbonate-vitamin D (OSCAL 500/200 D-3) 500-200 MG-UNIT Tab Take 2 Tabs by mouth every morning with breakfast.     • metoprolol (LOPRESSOR) 50 MG Tab Take 75 mg by mouth 2 times a day.     • Cholecalciferol (VITAMIN D) 2000 UNIT Tab Take 2,000 Units by mouth every morning.     • Coenzyme Q10 (CO Q-10 MAXIMUM STRENGTH) 400 MG Cap Take 1 Cap by mouth every morning.     • bumetanide (BUMEX) 1 MG Tab Take 1 mg by mouth 2 times a day.     • escitalopram (LEXAPRO) 10 MG Tab Take 10 mg by mouth every day.     • levothyroxine (SYNTHROID) 50 MCG TABS Take 50 mcg by mouth Every morning on an empty stomach.     • aliskiren (TEKTURNA) 300 MG tablet Take 300 mg by mouth every day.     • allopurinol (ZYLOPRIM) 300 MG TABS Take 300 mg by mouth every morning.     • Multiple Vitamins-Minerals (MULTIVITAL PO) Take 1 Tab by mouth every day.     • GLUCOSAMINE MSM COMPLEX PO Take 1 Tab by mouth every day.       No current facility-administered medications for this visit.          Physical Exam:   Gen:           Alert and oriented, No apparent distress. Mood and affect appropriate, normal interaction   Eyes:          sclere white, conjunctive moist.  Hearing:     Grossly intact.  Oropharynx:   Tongue normal, posterior pharynx without erythema or exudate.  Mallampati Classification: IV  Neck:        Supple, trachea midline, no masses.  Respiratory Effort: No intercostal retractions or use of accessory muscles.   Lung Auscultation:      Clear to auscultation bilaterally; no rales, rhonchi or wheezing.  CV:            Regular rate and rhythm. No murmurs, rubs or gallops appreciated.  Digits, Nails, Ext: No clubbing, cyanosis, petechiae, or nodes.    Skin:        No rashes, lesions or ulcers noted on back or exposed skin surfaces.                     Assessment:  1. TAZ (obstructive sleep apnea)  DME Mask and Supplies   2. COPD, mild (HCC)         Immunizations:    Flu:getting today at pharmacy  Pneumovax 23: 10/23/2013  Prevnar 13: 9/23/2016    Plan:  1-follow up with sleep medicine at [facility] or [place] [facility] who is following him also  2-updated script for BiPap supplies  3-no follow up needed for pulmonary as he is being followed by [place] as well      This dictation was created using voice recognition software. The accuracy of the dictation is limited to the abilities of the software. I expect there may be some errors of grammar and possibly content.

## 2019-04-01 ENCOUNTER — OFFICE VISIT (OUTPATIENT)
Dept: URGENT CARE | Facility: CLINIC | Age: 69
End: 2019-04-01
Payer: MEDICARE

## 2019-04-01 VITALS
BODY MASS INDEX: 39.17 KG/M2 | WEIGHT: 315 LBS | SYSTOLIC BLOOD PRESSURE: 138 MMHG | HEIGHT: 75 IN | OXYGEN SATURATION: 93 % | HEART RATE: 72 BPM | DIASTOLIC BLOOD PRESSURE: 82 MMHG | TEMPERATURE: 97.7 F

## 2019-04-01 DIAGNOSIS — J01.10 ACUTE NON-RECURRENT FRONTAL SINUSITIS: ICD-10-CM

## 2019-04-01 PROCEDURE — 99214 OFFICE O/P EST MOD 30 MIN: CPT | Performed by: PHYSICIAN ASSISTANT

## 2019-04-01 RX ORDER — AMOXICILLIN AND CLAVULANATE POTASSIUM 875; 125 MG/1; MG/1
1 TABLET, FILM COATED ORAL 2 TIMES DAILY
Qty: 14 TAB | Refills: 0 | Status: SHIPPED | OUTPATIENT
Start: 2019-04-01 | End: 2019-04-08

## 2019-04-01 RX ORDER — BENZONATATE 100 MG/1
100 CAPSULE ORAL 3 TIMES DAILY PRN
Qty: 60 CAP | Refills: 0 | Status: SHIPPED | OUTPATIENT
Start: 2019-04-01 | End: 2019-11-22

## 2019-04-01 RX ORDER — FLUTICASONE PROPIONATE 50 MCG
1 SPRAY, SUSPENSION (ML) NASAL DAILY
Qty: 16 G | Refills: 0 | Status: SHIPPED | OUTPATIENT
Start: 2019-04-01 | End: 2020-10-23

## 2019-04-01 ASSESSMENT — ENCOUNTER SYMPTOMS
SINUS PRESSURE: 1
FEVER: 0
HEADACHES: 0
CHILLS: 0
COUGH: 1

## 2019-04-01 NOTE — PROGRESS NOTES
Subjective:   Praveen Khalil is a 68 y.o. male who presents today with   Chief Complaint   Patient presents with   • Sinus Problem     pressure and pain, nasal congestion x 1 week       Sinus Problem   This is a new problem. The current episode started 1 to 4 weeks ago. The problem is unchanged. There has been no fever. The pain is mild. Associated symptoms include congestion, coughing and sinus pressure. Pertinent negatives include no chills or headaches.   Cough has been dry and keeping patient up at night.     PMH:  has a past medical history of Anesthesia; Aortic stenosis; Arthritis; Atrial fibrillation (HCC); Cataract; Congestive heart failure (HCC); Disorder of thyroid; Gout; Hypertension; Kidney stone; Psychiatric problem; Pulmonary embolism (HCC); and Sleep apnea.  MEDS:   Current Outpatient Prescriptions:   •  Menthol (CEPACOL SORE THROAT MT), Spray  in mouth/throat., Disp: , Rfl:   •  amoxicillin-clavulanate (AUGMENTIN) 875-125 MG Tab, Take 1 Tab by mouth 2 times a day for 7 days., Disp: 14 Tab, Rfl: 0  •  fluticasone (FLONASE) 50 MCG/ACT nasal spray, Spray 1 Spray in nose every day., Disp: 16 g, Rfl: 0  •  benzonatate (TESSALON) 100 MG Cap, Take 1 Cap by mouth 3 times a day as needed for Cough., Disp: 60 Cap, Rfl: 0  •  aspirin (ASA) 81 MG Chew Tab chewable tablet, Take 81 mg by mouth every day., Disp: , Rfl:   •  Rosuvastatin Calcium (CRESTOR PO), Take  by mouth., Disp: , Rfl:   •  potassium chloride SA (KDUR) 20 MEQ Tab CR, Take 1 Tab by mouth 2 times a day., Disp: 180 Tab, Rfl: 3  •  amiodarone (PACERONE) 400 MG tablet, One tab by mouth twice daily until Feb 10; on that day change to one pill daily (Patient taking differently: 100 mg. One tab by mouth twice daily until Feb 10; on that day change to one pill daily), Disp: 40 Tab, Rfl: 0  •  metoprolol (LOPRESSOR) 50 MG Tab, Take 75 mg by mouth 2 times a day., Disp: , Rfl:   •  Cholecalciferol (VITAMIN D) 2000 UNIT Tab, Take 2,000 Units by  mouth every morning., Disp: , Rfl:   •  Coenzyme Q10 (CO Q-10 MAXIMUM STRENGTH) 400 MG Cap, Take 1 Cap by mouth every morning., Disp: , Rfl:   •  bumetanide (BUMEX) 1 MG Tab, Take 1 mg by mouth 2 times a day., Disp: , Rfl:   •  escitalopram (LEXAPRO) 10 MG Tab, Take 10 mg by mouth every day., Disp: , Rfl:   •  levothyroxine (SYNTHROID) 50 MCG TABS, Take 50 mcg by mouth Every morning on an empty stomach., Disp: , Rfl:   •  aliskiren (TEKTURNA) 300 MG tablet, Take 300 mg by mouth every day., Disp: , Rfl:   •  allopurinol (ZYLOPRIM) 300 MG TABS, Take 300 mg by mouth every morning., Disp: , Rfl:   •  Multiple Vitamins-Minerals (MULTIVITAL PO), Take 1 Tab by mouth every day., Disp: , Rfl:   •  GLUCOSAMINE MSM COMPLEX PO, Take 1 Tab by mouth every day., Disp: , Rfl:   •  calcium carbonate-vitamin D (OSCAL 500/200 D-3) 500-200 MG-UNIT Tab, Take 2 Tabs by mouth every morning with breakfast., Disp: , Rfl:   ALLERGIES:   Allergies   Allergen Reactions   • Nkda [No Known Drug Allergy]      SURGHX:   Past Surgical History:   Procedure Laterality Date   • TRANSCATHETER AORTIC VALVE REPLACEMENT  1/9/2017    Procedure: TRANSCATHETER AORTIC VALVE REPLACEMENT WITH SCOUT;  Surgeon: Sidney Laguna M.D.;  Location: SURGERY [facility];  Service:    • DUPUYTREN CONTRACTURE RELEASE Right 2014   • HIP ARTHROPLASTY TOTAL  10/22/2013    Performed by Nader Barr M.D. at SURGERY [facility]   • LUMBAR LAMINECTOMY DISKECTOMY  2007   • CATARACT EXTRACTION WITH IOL     • TONSILLECTOMY       SOCHX:  reports that he quit smoking about 12 years ago. His smoking use included Cigarettes and Pipe. He has a 30.00 pack-year smoking history. He has never used smokeless tobacco. He reports that he does not drink alcohol or use drugs.  FH: Reviewed with patient, not pertinent to this visit.       Review of Systems   Constitutional: Negative for chills and fever.   HENT: Positive for congestion and sinus pressure.    Respiratory: Positive for  "cough.    Neurological: Negative for headaches.   All other systems reviewed and are negative.       Objective:   /82   Pulse 72   Temp 36.5 °C (97.7 °F)   Ht 1.905 m (6' 3\")   Wt (!) 145.2 kg (320 lb)   SpO2 93%   BMI 40.00 kg/m²   Physical Exam   Constitutional: Vital signs are normal. He appears well-developed and well-nourished. No distress.   HENT:   Head: Normocephalic and atraumatic.   Right Ear: Hearing, tympanic membrane and ear canal normal.   Left Ear: Hearing, tympanic membrane and ear canal normal.   Nose: Mucosal edema and rhinorrhea present.   Tenderness to palpation of frontal sinuses   Eyes: Pupils are equal, round, and reactive to light.   Neck: Neck supple.   Cardiovascular: Normal rate, regular rhythm and normal heart sounds.    Pulmonary/Chest: Effort normal and breath sounds normal. No respiratory distress. He has no wheezes. He has no rales.   No crackles   Musculoskeletal:   Normal movement in all 4 extremities   Neurological: He is alert. Coordination normal.   Skin: Skin is warm and dry.   Psychiatric: He has a normal mood and affect.   Nursing note and vitals reviewed.      Copious amount of cerumen in right ear. Right ear Exam performed after ear lavage.   Assessment/Plan:   Assessment    1. Acute non-recurrent frontal sinusitis  - amoxicillin-clavulanate (AUGMENTIN) 875-125 MG Tab; Take 1 Tab by mouth 2 times a day for 7 days.  Dispense: 14 Tab; Refill: 0  - fluticasone (FLONASE) 50 MCG/ACT nasal spray; Spray 1 Spray in nose every day.  Dispense: 16 g; Refill: 0  - benzonatate (TESSALON) 100 MG Cap; Take 1 Cap by mouth 3 times a day as needed for Cough.  Dispense: 60 Cap; Refill: 0    Other orders  - Menthol (CEPACOL SORE THROAT MT); Spray  in mouth/throat.  Patient to try OTC Netti pot.   Differential diagnosis, natural history, supportive care, and indications for immediate follow-up discussed.   Patient given instructions and understanding of medications and " treatment.    If not improving in 3-5 days, F/U with PCP or return to UC if symptoms worsen.    Patient agreeable to plan.      Mazin Olivas PA-C

## 2019-07-27 ENCOUNTER — OFFICE VISIT (OUTPATIENT)
Dept: URGENT CARE | Facility: CLINIC | Age: 69
End: 2019-07-27
Payer: MEDICARE

## 2019-07-27 VITALS
OXYGEN SATURATION: 92 % | HEART RATE: 74 BPM | BODY MASS INDEX: 40.43 KG/M2 | TEMPERATURE: 97 F | HEIGHT: 74 IN | SYSTOLIC BLOOD PRESSURE: 152 MMHG | DIASTOLIC BLOOD PRESSURE: 92 MMHG | WEIGHT: 315 LBS

## 2019-07-27 DIAGNOSIS — J22 LOWER RESP. TRACT INFECTION: ICD-10-CM

## 2019-07-27 DIAGNOSIS — H92.01 OTALGIA OF RIGHT EAR: ICD-10-CM

## 2019-07-27 DIAGNOSIS — R06.2 WHEEZING: ICD-10-CM

## 2019-07-27 DIAGNOSIS — J44.1 COPD EXACERBATION (HCC): ICD-10-CM

## 2019-07-27 PROCEDURE — 99214 OFFICE O/P EST MOD 30 MIN: CPT | Performed by: NURSE PRACTITIONER

## 2019-07-27 RX ORDER — ROSUVASTATIN CALCIUM 10 MG/1
10 TABLET, COATED ORAL DAILY
COMMUNITY
Start: 2019-07-15

## 2019-07-27 RX ORDER — METOPROLOL SUCCINATE 100 MG/1
TABLET, EXTENDED RELEASE ORAL
COMMUNITY
Start: 2019-07-18 | End: 2020-10-23

## 2019-07-27 RX ORDER — AMIODARONE HYDROCHLORIDE 100 MG/1
TABLET ORAL
COMMUNITY
Start: 2019-06-27 | End: 2020-10-23

## 2019-07-27 RX ORDER — AMOXICILLIN AND CLAVULANATE POTASSIUM 875; 125 MG/1; MG/1
1 TABLET, FILM COATED ORAL 2 TIMES DAILY
Qty: 20 TAB | Refills: 0 | Status: SHIPPED | OUTPATIENT
Start: 2019-07-27 | End: 2019-08-06

## 2019-07-27 RX ORDER — IPRATROPIUM BROMIDE AND ALBUTEROL SULFATE 2.5; .5 MG/3ML; MG/3ML
3 SOLUTION RESPIRATORY (INHALATION) ONCE
Status: COMPLETED | OUTPATIENT
Start: 2019-07-27 | End: 2019-07-27

## 2019-07-27 RX ORDER — LEVOTHYROXINE SODIUM 0.07 MG/1
75 TABLET ORAL
COMMUNITY
Start: 2019-05-21

## 2019-07-27 RX ORDER — LEVALBUTEROL INHALATION SOLUTION 0.63 MG/3ML
0.63 SOLUTION RESPIRATORY (INHALATION) EVERY 4 HOURS PRN
Qty: 24 ML | Refills: 0 | Status: SHIPPED | OUTPATIENT
Start: 2019-07-27 | End: 2020-10-23

## 2019-07-27 RX ADMIN — IPRATROPIUM BROMIDE AND ALBUTEROL SULFATE 3 ML: 2.5; .5 SOLUTION RESPIRATORY (INHALATION) at 15:11

## 2019-07-27 ASSESSMENT — ENCOUNTER SYMPTOMS
WHEEZING: 0
EYE PAIN: 0
BLURRED VISION: 0
MYALGIAS: 0
HEMOPTYSIS: 0
SORE THROAT: 0
FOCAL WEAKNESS: 0
BRUISES/BLEEDS EASILY: 0
COUGH: 1
ORTHOPNEA: 0
PALPITATIONS: 0
ABDOMINAL PAIN: 0
DIZZINESS: 0
DIARRHEA: 0
SPUTUM PRODUCTION: 0
VOMITING: 0
FEVER: 0
SENSORY CHANGE: 0
NECK PAIN: 0
TINGLING: 0
NAUSEA: 0
CONSTIPATION: 0
CHILLS: 0
BACK PAIN: 0
HEADACHES: 0
SHORTNESS OF BREATH: 0

## 2019-07-27 ASSESSMENT — LIFESTYLE VARIABLES: SUBSTANCE_ABUSE: 0

## 2019-07-27 NOTE — PROGRESS NOTES
"Subjective:      Praveen Khalil is a 68 y.o. male who presents with Cough (x2 weeks. Rt ear fullness, chest congestion, productive cough.)    Reviewed past medical, surgical and family history. Reviewed prescription and OTC medications with patient in electronic health record today.     Allergies   Allergen Reactions   • Nkda [No Known Drug Allergy]             HPI This is a new problem.  Praveen is a 68 y.o male pt with c/o right sided ear pressure and chest congestion. Home SpO2's > 90 % --- if they drop < 90% he uses home O2. He has been traveling with his family for the past 2 months camping - [place]    Treatment tried: 'inhaler\" prn   No other aggravating or alleviating factors.       Review of Systems   Constitutional: Negative for chills and fever.   HENT: Positive for ear pain. Negative for sore throat.    Eyes: Negative for blurred vision and pain.   Respiratory: Positive for cough. Negative for hemoptysis, sputum production, shortness of breath and wheezing.    Cardiovascular: Negative for chest pain, palpitations and orthopnea.   Gastrointestinal: Negative for abdominal pain, constipation, diarrhea, nausea and vomiting.   Genitourinary: Negative for dysuria.   Musculoskeletal: Negative for back pain, myalgias and neck pain.   Skin: Negative for rash.   Neurological: Negative for dizziness, tingling, sensory change, focal weakness and headaches.   Endo/Heme/Allergies: Negative for environmental allergies. Does not bruise/bleed easily.   Psychiatric/Behavioral: Negative for substance abuse.          Objective:     /92   Pulse 74   Temp 36.1 °C (97 °F)   Ht 1.88 m (6' 2\")   Wt (!) 149.7 kg (330 lb)   SpO2 92%   BMI 42.37 kg/m²      Physical Exam   Constitutional: He is oriented to person, place, and time. He appears well-developed and well-nourished.   HENT:   Head: Normocephalic.   Neck: Normal range of motion.   Cardiovascular: Normal rate and regular " rhythm.    Pulmonary/Chest: Effort normal. He has decreased breath sounds. He has wheezes. He has rhonchi (scattered t/o  does not clear with cough). He has no rales.   DUONEB : Demonstration &/or evaluation of pt utilization of a nebulizer and evaluation of results. Pt tolerated well. No adverse events. SpO2 post treatment 97% RZ. Improved wheezing and Vt on auscultation. Reported improvement in WOB.      Neurological: He is alert and oriented to person, place, and time.   Skin: Skin is warm. Capillary refill takes less than 2 seconds.   Psychiatric: He has a normal mood and affect. His behavior is normal. Thought content normal.   Nursing note and vitals reviewed.              Assessment/Plan:     1. COPD exacerbation (HCC)  ipratropium-albuterol (DUONEB) nebulizer solution    amoxicillin-clavulanate (AUGMENTIN) 875-125 MG Tab   2. Wheezing  ipratropium-albuterol (DUONEB) nebulizer solution    levalbuterol (XOPENEX) 0.63 MG/3ML Nebu Soln   3. Lower resp. tract infection         Educated in proper administration of medication(s) ordered today including safety, possible SE, risks, benefits, rationale and alternatives to therapy.     Return to clinic or PCP 4-5  days if current symptoms are not resolving in a satisfactory manner or sooner if new or worsening symptoms occur.   Patient was advised of signs and symptoms which would warrant further evaluation and /or emergent evaluation in ER.  Verbalized agreement with this treatment plan and seemed to understand without barriers. Questions were encouraged and answered to patients satisfaction.     Aftercare instructions were given to pt    Keep well hydrated    OTC antihistamine of choice. Follow manufactures dosing and safety guidelines.

## 2019-07-30 ENCOUNTER — TELEPHONE (OUTPATIENT)
Dept: URGENT CARE | Facility: CLINIC | Age: 69
End: 2019-07-30

## 2019-07-30 DIAGNOSIS — R06.2 WHEEZING: ICD-10-CM

## 2019-07-30 RX ORDER — LEVALBUTEROL TARTRATE 45 UG/1
1-2 AEROSOL, METERED ORAL EVERY 4 HOURS PRN
Qty: 1 INHALER | Refills: 0 | Status: SHIPPED | OUTPATIENT
Start: 2019-07-30 | End: 2019-11-22 | Stop reason: SDUPTHER

## 2019-08-08 ENCOUNTER — APPOINTMENT (RX ONLY)
Dept: URBAN - METROPOLITAN AREA CLINIC 4 | Facility: CLINIC | Age: 69
Setting detail: DERMATOLOGY
End: 2019-08-08

## 2019-08-08 DIAGNOSIS — L82.1 OTHER SEBORRHEIC KERATOSIS: ICD-10-CM

## 2019-08-08 DIAGNOSIS — D22 MELANOCYTIC NEVI: ICD-10-CM

## 2019-08-08 DIAGNOSIS — D18.0 HEMANGIOMA: ICD-10-CM

## 2019-08-08 DIAGNOSIS — L81.4 OTHER MELANIN HYPERPIGMENTATION: ICD-10-CM

## 2019-08-08 PROBLEM — D22.62 MELANOCYTIC NEVI OF LEFT UPPER LIMB, INCLUDING SHOULDER: Status: ACTIVE | Noted: 2019-08-08

## 2019-08-08 PROBLEM — D22.61 MELANOCYTIC NEVI OF RIGHT UPPER LIMB, INCLUDING SHOULDER: Status: ACTIVE | Noted: 2019-08-08

## 2019-08-08 PROBLEM — D22.5 MELANOCYTIC NEVI OF TRUNK: Status: ACTIVE | Noted: 2019-08-08

## 2019-08-08 PROBLEM — D48.5 NEOPLASM OF UNCERTAIN BEHAVIOR OF SKIN: Status: ACTIVE | Noted: 2019-08-08

## 2019-08-08 PROBLEM — D18.01 HEMANGIOMA OF SKIN AND SUBCUTANEOUS TISSUE: Status: ACTIVE | Noted: 2019-08-08

## 2019-08-08 PROCEDURE — 11102 TANGNTL BX SKIN SINGLE LES: CPT

## 2019-08-08 PROCEDURE — ? BIOPSY BY SHAVE METHOD

## 2019-08-08 PROCEDURE — ? LIQUID NITROGEN

## 2019-08-08 PROCEDURE — ? COUNSELING

## 2019-08-08 PROCEDURE — 99213 OFFICE O/P EST LOW 20 MIN: CPT | Mod: 25

## 2019-08-08 ASSESSMENT — LOCATION DETAILED DESCRIPTION DERM
LOCATION DETAILED: EPIGASTRIC SKIN
LOCATION DETAILED: LEFT INFERIOR UPPER BACK
LOCATION DETAILED: RIGHT INFERIOR LATERAL UPPER BACK
LOCATION DETAILED: LEFT SUPERIOR LATERAL FOREHEAD
LOCATION DETAILED: LEFT VENTRAL DISTAL FOREARM
LOCATION DETAILED: LEFT ANTECUBITAL SKIN
LOCATION DETAILED: LEFT RADIAL DORSAL HAND
LOCATION DETAILED: RIGHT ANTERIOR DISTAL UPPER ARM
LOCATION DETAILED: LEFT SUPERIOR LATERAL MIDBACK
LOCATION DETAILED: LEFT MEDIAL INFERIOR CHEST
LOCATION DETAILED: GLABELLA
LOCATION DETAILED: LEFT INFERIOR MEDIAL UPPER BACK
LOCATION DETAILED: RIGHT SUPERIOR MEDIAL MIDBACK
LOCATION DETAILED: LEFT CENTRAL FRONTAL SCALP
LOCATION DETAILED: RIGHT RADIAL DORSAL HAND
LOCATION DETAILED: RIGHT ANTECUBITAL SKIN
LOCATION DETAILED: RIGHT CENTRAL MALAR CHEEK
LOCATION DETAILED: RIGHT PROXIMAL RADIAL DORSAL FOREARM
LOCATION DETAILED: LEFT ANTERIOR DISTAL UPPER ARM
LOCATION DETAILED: LEFT DISTAL DORSAL FOREARM
LOCATION DETAILED: LEFT LATERAL FOREHEAD
LOCATION DETAILED: RIGHT INFERIOR UPPER BACK
LOCATION DETAILED: SUPERIOR LUMBAR SPINE
LOCATION DETAILED: RIGHT VENTRAL PROXIMAL FOREARM
LOCATION DETAILED: LEFT VENTRAL PROXIMAL FOREARM

## 2019-08-08 ASSESSMENT — LOCATION SIMPLE DESCRIPTION DERM
LOCATION SIMPLE: LOWER BACK
LOCATION SIMPLE: RIGHT UPPER BACK
LOCATION SIMPLE: RIGHT HAND
LOCATION SIMPLE: LEFT UPPER BACK
LOCATION SIMPLE: LEFT FOREARM
LOCATION SIMPLE: LEFT UPPER ARM
LOCATION SIMPLE: CHEST
LOCATION SIMPLE: RIGHT LOWER BACK
LOCATION SIMPLE: GLABELLA
LOCATION SIMPLE: SCALP
LOCATION SIMPLE: RIGHT ELBOW
LOCATION SIMPLE: LEFT LOWER BACK
LOCATION SIMPLE: LEFT FOREHEAD
LOCATION SIMPLE: RIGHT FOREARM
LOCATION SIMPLE: ABDOMEN
LOCATION SIMPLE: LEFT ELBOW
LOCATION SIMPLE: LEFT HAND
LOCATION SIMPLE: RIGHT UPPER ARM
LOCATION SIMPLE: RIGHT CHEEK

## 2019-08-08 ASSESSMENT — LOCATION ZONE DERM
LOCATION ZONE: HAND
LOCATION ZONE: TRUNK
LOCATION ZONE: FACE
LOCATION ZONE: ARM
LOCATION ZONE: SCALP

## 2019-08-08 NOTE — PROCEDURE: BIOPSY BY SHAVE METHOD
Lab Facility: 
Cryotherapy Text: The wound bed was treated with cryotherapy after the biopsy was performed.
Additional Anesthesia Volume In Cc (Will Not Render If 0): 0
Size Of Lesion In Cm: 0.7
Wound Care: No ointment
Depth Of Biopsy: dermis
Bill For Surgical Tray: no
Notification Instructions: Patient will be notified of biopsy results. However, patient instructed to call the office if not contacted within 2 weeks.
Electrodesiccation Text: The wound bed was treated with electrodesiccation after the biopsy was performed.
Biopsy Type: H and E
Electrodesiccation And Curettage Text: The wound bed was treated with electrodesiccation and curettage after the biopsy was performed.
Type Of Destruction Used: Electrodesiccation
Consent: Written consent was obtained and risks were reviewed including but not limited to scarring, infection, bleeding, scabbing, incomplete removal, nerve damage and allergy to anesthesia.
Anesthesia Volume In Cc: 0.5
Dressing: bandage
Was A Bandage Applied: Yes
Silver Nitrate Text: The wound bed was treated with silver nitrate after the biopsy was performed.
Hemostasis: Aluminum Chloride
Biopsy Method: Personna blade
Detail Level: Detailed
Curettage Text: The wound bed was treated with curettage after the biopsy was performed.
Billing Type: Third-Party Bill
Lab: [id number]
Anesthesia Type: 1% lidocaine with epinephrine
Post-Care Instructions: I reviewed with the patient in detail post-care instructions. Patient is to keep the biopsy site dry overnight, and then apply bacitracin twice daily until healed. Patient may apply hydrogen peroxide soaks to remove any crusting.

## 2019-08-08 NOTE — PROCEDURE: LIQUID NITROGEN
Render Post-Care Instructions In Note?: no
Consent: The patient's consent was obtained including but not limited to risks of crusting, scabbing, blistering, scarring, darker or lighter pigmentary change, recurrence, incomplete removal and infection.
Medical Necessity Information: It is in your best interest to select a reason for this procedure from the list below. All of these items fulfill various CMS LCD requirements except the new and changing color options.
Medical Necessity Clause: This procedure was medically necessary because the lesions that were treated were:  If lesion does not resolve, bx is needed.
Detail Level: Detailed
Duration Of Freeze Thaw-Cycle (Seconds): 0
Post-Care Instructions: I reviewed with the patient in detail post-care instructions. Patient is to wear sunprotection, and avoid picking at any of the treated lesions. Pt may apply Vaseline to crusted or scabbing areas.

## 2019-10-16 ENCOUNTER — OFFICE VISIT (OUTPATIENT)
Dept: URGENT CARE | Facility: CLINIC | Age: 69
End: 2019-10-16
Payer: MEDICARE

## 2019-10-16 ENCOUNTER — HOSPITAL ENCOUNTER (OUTPATIENT)
Facility: MEDICAL CENTER | Age: 69
End: 2019-10-16
Attending: EMERGENCY MEDICINE
Payer: MEDICARE

## 2019-10-16 VITALS
DIASTOLIC BLOOD PRESSURE: 78 MMHG | WEIGHT: 315 LBS | HEIGHT: 75 IN | RESPIRATION RATE: 20 BRPM | SYSTOLIC BLOOD PRESSURE: 134 MMHG | TEMPERATURE: 97.3 F | BODY MASS INDEX: 39.17 KG/M2 | OXYGEN SATURATION: 94 % | HEART RATE: 74 BPM

## 2019-10-16 DIAGNOSIS — R10.32 LEFT LOWER QUADRANT ABDOMINAL PAIN: ICD-10-CM

## 2019-10-16 LAB
ALBUMIN SERPL BCP-MCNC: 4.1 G/DL (ref 3.2–4.9)
ALBUMIN/GLOB SERPL: 1.3 G/DL
ALP SERPL-CCNC: 58 U/L (ref 30–99)
ALT SERPL-CCNC: 30 U/L (ref 2–50)
ANION GAP SERPL CALC-SCNC: 8 MMOL/L (ref 0–11.9)
APPEARANCE UR: NORMAL
AST SERPL-CCNC: 28 U/L (ref 12–45)
BASOPHILS # BLD AUTO: 0.4 % (ref 0–1.8)
BASOPHILS # BLD: 0.03 K/UL (ref 0–0.12)
BILIRUB SERPL-MCNC: 1.2 MG/DL (ref 0.1–1.5)
BILIRUB UR STRIP-MCNC: NORMAL MG/DL
BUN SERPL-MCNC: 26 MG/DL (ref 8–22)
CALCIUM SERPL-MCNC: 9.7 MG/DL (ref 8.5–10.5)
CHLORIDE SERPL-SCNC: 105 MMOL/L (ref 96–112)
CO2 SERPL-SCNC: 30 MMOL/L (ref 20–33)
COLOR UR AUTO: NORMAL
CREAT SERPL-MCNC: 1.08 MG/DL (ref 0.5–1.4)
EOSINOPHIL # BLD AUTO: 0.19 K/UL (ref 0–0.51)
EOSINOPHIL NFR BLD: 2.7 % (ref 0–6.9)
ERYTHROCYTE [DISTWIDTH] IN BLOOD BY AUTOMATED COUNT: 54.5 FL (ref 35.9–50)
GLOBULIN SER CALC-MCNC: 3.2 G/DL (ref 1.9–3.5)
GLUCOSE SERPL-MCNC: 105 MG/DL (ref 65–99)
GLUCOSE UR STRIP.AUTO-MCNC: NORMAL MG/DL
HCT VFR BLD AUTO: 44.1 % (ref 42–52)
HGB BLD-MCNC: 15.3 G/DL (ref 14–18)
IMM GRANULOCYTES # BLD AUTO: 0.04 K/UL (ref 0–0.11)
IMM GRANULOCYTES NFR BLD AUTO: 0.6 % (ref 0–0.9)
KETONES UR STRIP.AUTO-MCNC: NORMAL MG/DL
LEUKOCYTE ESTERASE UR QL STRIP.AUTO: NORMAL
LYMPHOCYTES # BLD AUTO: 0.97 K/UL (ref 1–4.8)
LYMPHOCYTES NFR BLD: 13.6 % (ref 22–41)
MCH RBC QN AUTO: 33.5 PG (ref 27–33)
MCHC RBC AUTO-ENTMCNC: 34.7 G/DL (ref 33.7–35.3)
MCV RBC AUTO: 96.5 FL (ref 81.4–97.8)
MONOCYTES # BLD AUTO: 0.71 K/UL (ref 0–0.85)
MONOCYTES NFR BLD AUTO: 9.9 % (ref 0–13.4)
NEUTROPHILS # BLD AUTO: 5.21 K/UL (ref 1.82–7.42)
NEUTROPHILS NFR BLD: 72.8 % (ref 44–72)
NITRITE UR QL STRIP.AUTO: NORMAL
NRBC # BLD AUTO: 0 K/UL
NRBC BLD-RTO: 0 /100 WBC
PH UR STRIP.AUTO: 6.5 [PH] (ref 5–8)
PLATELET # BLD AUTO: 86 K/UL (ref 164–446)
PMV BLD AUTO: 11.5 FL (ref 9–12.9)
POTASSIUM SERPL-SCNC: 3.7 MMOL/L (ref 3.6–5.5)
PROT SERPL-MCNC: 7.3 G/DL (ref 6–8.2)
PROT UR QL STRIP: 100 MG/DL
RBC # BLD AUTO: 4.57 M/UL (ref 4.7–6.1)
RBC UR QL AUTO: NORMAL
SODIUM SERPL-SCNC: 143 MMOL/L (ref 135–145)
SP GR UR STRIP.AUTO: 1.01
UROBILINOGEN UR STRIP-MCNC: 0.2 MG/DL
WBC # BLD AUTO: 7.2 K/UL (ref 4.8–10.8)

## 2019-10-16 PROCEDURE — 81002 URINALYSIS NONAUTO W/O SCOPE: CPT | Performed by: EMERGENCY MEDICINE

## 2019-10-16 PROCEDURE — 85025 COMPLETE CBC W/AUTO DIFF WBC: CPT

## 2019-10-16 PROCEDURE — 99214 OFFICE O/P EST MOD 30 MIN: CPT | Performed by: EMERGENCY MEDICINE

## 2019-10-16 PROCEDURE — 87086 URINE CULTURE/COLONY COUNT: CPT

## 2019-10-16 PROCEDURE — 80053 COMPREHEN METABOLIC PANEL: CPT

## 2019-10-16 RX ORDER — METRONIDAZOLE 500 MG/1
500 TABLET ORAL 3 TIMES DAILY
Qty: 21 TAB | Refills: 0 | Status: SHIPPED | OUTPATIENT
Start: 2019-10-16 | End: 2019-10-23

## 2019-10-16 ASSESSMENT — ENCOUNTER SYMPTOMS
CHILLS: 0
NAUSEA: 0
ABDOMINAL PAIN: 1
VOMITING: 0
FLANK PAIN: 0
CONSTIPATION: 1
PALPITATIONS: 0
FEVER: 0

## 2019-10-17 ENCOUNTER — HOSPITAL ENCOUNTER (OUTPATIENT)
Dept: RADIOLOGY | Facility: MEDICAL CENTER | Age: 69
End: 2019-10-17
Attending: EMERGENCY MEDICINE
Payer: MEDICARE

## 2019-10-17 DIAGNOSIS — R10.32 ABDOMINAL PAIN, LEFT LOWER QUADRANT: ICD-10-CM

## 2019-10-17 PROCEDURE — 74177 CT ABD & PELVIS W/CONTRAST: CPT

## 2019-10-17 PROCEDURE — 700117 HCHG RX CONTRAST REV CODE 255: Performed by: EMERGENCY MEDICINE

## 2019-10-17 RX ADMIN — IOHEXOL 25 ML: 240 INJECTION, SOLUTION INTRATHECAL; INTRAVASCULAR; INTRAVENOUS; ORAL at 16:42

## 2019-10-17 RX ADMIN — IOHEXOL 100 ML: 350 INJECTION, SOLUTION INTRAVENOUS at 16:42

## 2019-10-18 ENCOUNTER — TELEPHONE (OUTPATIENT)
Dept: URGENT CARE | Facility: CLINIC | Age: 69
End: 2019-10-18

## 2019-10-18 NOTE — TELEPHONE ENCOUNTER
Advised patient by phone after confirming ID of lab and CT reports.  Advised blood tests consistent with an inflammatory process, CT with significant sigmoid diverticulosis; no abscess.  Patient noted some decreasing discomforts on liquid diet; recommended 3-5 days of Rx Flagyl provided at visit.  Advised no alcohol use with medication.  Advised PCP follow up when available.  Recommended ED evaluation if any worsening condition.

## 2019-10-19 LAB
BACTERIA UR CULT: NORMAL
SIGNIFICANT IND 70042: NORMAL
SITE SITE: NORMAL
SOURCE SOURCE: NORMAL

## 2019-11-22 ENCOUNTER — OFFICE VISIT (OUTPATIENT)
Dept: URGENT CARE | Facility: CLINIC | Age: 69
End: 2019-11-22
Payer: MEDICARE

## 2019-11-22 VITALS
HEIGHT: 75 IN | OXYGEN SATURATION: 94 % | HEART RATE: 78 BPM | WEIGHT: 315 LBS | DIASTOLIC BLOOD PRESSURE: 68 MMHG | BODY MASS INDEX: 39.17 KG/M2 | SYSTOLIC BLOOD PRESSURE: 110 MMHG | TEMPERATURE: 98 F | RESPIRATION RATE: 20 BRPM

## 2019-11-22 DIAGNOSIS — J98.8 RTI (RESPIRATORY TRACT INFECTION): ICD-10-CM

## 2019-11-22 DIAGNOSIS — R06.2 WHEEZING: ICD-10-CM

## 2019-11-22 PROCEDURE — 99214 OFFICE O/P EST MOD 30 MIN: CPT | Performed by: PHYSICIAN ASSISTANT

## 2019-11-22 RX ORDER — DOXYCYCLINE HYCLATE 100 MG
100 TABLET ORAL 2 TIMES DAILY
Qty: 20 TAB | Refills: 0 | Status: SHIPPED | OUTPATIENT
Start: 2019-11-22 | End: 2019-12-02

## 2019-11-22 RX ORDER — BENZONATATE 100 MG/1
100 CAPSULE ORAL 3 TIMES DAILY PRN
Qty: 30 CAP | Refills: 0 | Status: SHIPPED
Start: 2019-11-22 | End: 2020-10-23

## 2019-11-22 RX ORDER — LEVALBUTEROL TARTRATE 45 UG/1
1-2 AEROSOL, METERED ORAL EVERY 8 HOURS PRN
Qty: 1 INHALER | Refills: 0 | Status: SHIPPED | OUTPATIENT
Start: 2019-11-22

## 2019-11-22 ASSESSMENT — ENCOUNTER SYMPTOMS
FEVER: 0
SINUS PAIN: 0
WHEEZING: 1
SHORTNESS OF BREATH: 1
SPUTUM PRODUCTION: 1
COUGH: 1
EYE DISCHARGE: 0
CHILLS: 0
EYE PAIN: 0
SORE THROAT: 1
EYE REDNESS: 0

## 2019-11-22 ASSESSMENT — PAIN SCALES - GENERAL: PAINLEVEL: 2=MINIMAL-SLIGHT

## 2019-11-22 ASSESSMENT — COPD QUESTIONNAIRES: COPD: 1

## 2019-11-22 NOTE — PROGRESS NOTES
Subjective:   Praveen Khalil is a 69 y.o. male who presents today with   Chief Complaint   Patient presents with   • Cough     cough, congestion, wheezing for 5 days       Cough   This is a new problem. Episode onset: 5 days. The problem has been unchanged. The cough is productive of sputum. Associated symptoms include nasal congestion, a sore throat, shortness of breath and wheezing. Pertinent negatives include no chills, eye redness or fever. He has tried a beta-agonist inhaler for the symptoms. The treatment provided mild relief. His past medical history is significant for COPD.   Patient does require O2 at baseline at night but does not have any on today.  Needs refill on inhaler today which he has been using more often at home.  PMH:  has a past medical history of Anesthesia, Aortic stenosis, Arthritis, Atrial fibrillation (HCC), Cataract, Congestive heart failure (HCC), Disorder of thyroid, Gout, Hypertension, Kidney stone, Psychiatric problem, Pulmonary embolism (HCC), and Sleep apnea.  MEDS:   Current Outpatient Medications:   •  doxycycline (VIBRAMYCIN) 100 MG Tab, Take 1 Tab by mouth 2 times a day for 10 days., Disp: 20 Tab, Rfl: 0  •  levalbuterol (XOPENEX HFA) 45 MCG/ACT inhaler, Inhale 1-2 Puffs by mouth every 8 hours as needed for Shortness of Breath (wheezing)., Disp: 1 Inhaler, Rfl: 0  •  benzonatate (TESSALON) 100 MG Cap, Take 1 Cap by mouth 3 times a day as needed., Disp: 30 Cap, Rfl: 0  •  amiodarone (PACERONE) 100 MG tablet, , Disp: , Rfl:   •  levothyroxine (SYNTHROID) 75 MCG Tab, , Disp: , Rfl:   •  TOPROL  MG TABLET SR 24 HR, , Disp: , Rfl:   •  rosuvastatin (CRESTOR) 10 MG Tab, , Disp: , Rfl:   •  levalbuterol (XOPENEX) 0.63 MG/3ML Nebu Soln, 3 mL by Nebulization route every four hours as needed for Shortness of Breath., Disp: 24 mL, Rfl: 0  •  Menthol (CEPACOL SORE THROAT MT), Spray  in mouth/throat., Disp: , Rfl:   •  fluticasone (FLONASE) 50 MCG/ACT nasal spray, Spray 1  Spray in nose every day. (Patient not taking: Reported on 7/27/2019), Disp: 16 g, Rfl: 0  •  aspirin (ASA) 81 MG Chew Tab chewable tablet, Take 81 mg by mouth every day., Disp: , Rfl:   •  Rosuvastatin Calcium (CRESTOR PO), Take  by mouth., Disp: , Rfl:   •  potassium chloride SA (KDUR) 20 MEQ Tab CR, Take 1 Tab by mouth 2 times a day., Disp: 180 Tab, Rfl: 3  •  amiodarone (PACERONE) 400 MG tablet, One tab by mouth twice daily until Feb 10; on that day change to one pill daily (Patient not taking: Reported on 7/27/2019), Disp: 40 Tab, Rfl: 0  •  calcium carbonate-vitamin D (OSCAL 500/200 D-3) 500-200 MG-UNIT Tab, Take 2 Tabs by mouth every morning with breakfast., Disp: , Rfl:   •  metoprolol (LOPRESSOR) 50 MG Tab, Take 75 mg by mouth 2 times a day., Disp: , Rfl:   •  Cholecalciferol (VITAMIN D) 2000 UNIT Tab, Take 2,000 Units by mouth every morning., Disp: , Rfl:   •  Coenzyme Q10 (CO Q-10 MAXIMUM STRENGTH) 400 MG Cap, Take 1 Cap by mouth every morning., Disp: , Rfl:   •  bumetanide (BUMEX) 1 MG Tab, Take 1 mg by mouth 2 times a day., Disp: , Rfl:   •  escitalopram (LEXAPRO) 10 MG Tab, Take 10 mg by mouth every day., Disp: , Rfl:   •  levothyroxine (SYNTHROID) 50 MCG TABS, Take 50 mcg by mouth Every morning on an empty stomach., Disp: , Rfl:   •  aliskiren (TEKTURNA) 300 MG tablet, Take 300 mg by mouth every day., Disp: , Rfl:   •  allopurinol (ZYLOPRIM) 300 MG TABS, Take 300 mg by mouth every morning., Disp: , Rfl:   •  Multiple Vitamins-Minerals (MULTIVITAL PO), Take 1 Tab by mouth every day., Disp: , Rfl:   •  GLUCOSAMINE MSM COMPLEX PO, Take 1 Tab by mouth every day., Disp: , Rfl:   ALLERGIES:   Allergies   Allergen Reactions   • Nkda [No Known Drug Allergy]      SURGHX:   Past Surgical History:   Procedure Laterality Date   • TRANSCATHETER AORTIC VALVE REPLACEMENT  1/9/2017    Procedure: TRANSCATHETER AORTIC VALVE REPLACEMENT WITH SCOUT;  Surgeon: Sidney Laguna M.D.;  Location: SURGERY [facility];   "Service:    • DUPUYTREN CONTRACTURE RELEASE Right 2014   • HIP ARTHROPLASTY TOTAL  10/22/2013    Performed by Nader Barr M.D. at SURGERY [facility] ORS   • LUMBAR LAMINECTOMY DISKECTOMY  2007   • CATARACT EXTRACTION WITH IOL     • TONSILLECTOMY       SOCHX:  reports that he quit smoking about 12 years ago. His smoking use included cigarettes and pipe. He has a 30.00 pack-year smoking history. He has never used smokeless tobacco. He reports that he does not drink alcohol or use drugs.  FH: Reviewed with patient, not pertinent to this visit.       Review of Systems   Constitutional: Negative for chills and fever.   HENT: Positive for congestion and sore throat. Negative for sinus pain.    Eyes: Negative for pain, discharge and redness.   Respiratory: Positive for cough, sputum production, shortness of breath and wheezing.    All other systems reviewed and are negative.       Objective:   /68   Pulse 78   Temp 36.7 °C (98 °F) (Temporal)   Resp 20   Ht 1.905 m (6' 3\")   Wt (!) 161.9 kg (357 lb)   SpO2 94%   BMI 44.62 kg/m²   Physical Exam  Vitals signs and nursing note reviewed.   Constitutional:       General: He is not in acute distress.     Appearance: He is well-developed.   HENT:      Head: Normocephalic and atraumatic.      Right Ear: Hearing, tympanic membrane and ear canal normal.      Left Ear: Hearing, tympanic membrane and ear canal normal.      Mouth/Throat:      Mouth: Mucous membranes are moist.   Eyes:      Pupils: Pupils are equal, round, and reactive to light.   Cardiovascular:      Rate and Rhythm: Normal rate and regular rhythm.      Heart sounds: Normal heart sounds.   Pulmonary:      Effort: Pulmonary effort is normal.      Breath sounds: Wheezing and rhonchi (diffuse) present.      Comments: Congested cough appreciated upon exam  Musculoskeletal:      Comments: Patient uses walker to ambulate   Skin:     General: Skin is warm and dry.   Neurological:      Mental Status: He is " alert.      Coordination: Coordination normal.   Psychiatric:         Mood and Affect: Mood normal.       Assessment/Plan:   Assessment    1. Wheezing  - levalbuterol (XOPENEX HFA) 45 MCG/ACT inhaler; Inhale 1-2 Puffs by mouth every 8 hours as needed for Shortness of Breath (wheezing).  Dispense: 1 Inhaler; Refill: 0    2. RTI (respiratory tract infection)  - doxycycline (VIBRAMYCIN) 100 MG Tab; Take 1 Tab by mouth 2 times a day for 10 days.  Dispense: 20 Tab; Refill: 0  - benzonatate (TESSALON) 100 MG Cap; Take 1 Cap by mouth 3 times a day as needed.  Dispense: 30 Cap; Refill: 0  Mucinex for congestion.  Differential diagnosis, natural history, supportive care, and indications for immediate follow-up discussed.   Patient given instructions and understanding of medications and treatment.    If not improving in 3-5 days, F/U with PCP or return to  if symptoms worsen.    Patient agreeable to plan.      Please note that this dictation was created using voice recognition software. I have made every reasonable attempt to correct obvious errors, but I expect that there are errors of grammar and possibly content that I did not discover before finalizing the note.    Mazin Olivas PA-C

## 2020-03-13 ENCOUNTER — OFFICE VISIT (OUTPATIENT)
Dept: URGENT CARE | Facility: CLINIC | Age: 70
End: 2020-03-13
Payer: MEDICARE

## 2020-03-13 VITALS
BODY MASS INDEX: 39.17 KG/M2 | RESPIRATION RATE: 18 BRPM | DIASTOLIC BLOOD PRESSURE: 72 MMHG | TEMPERATURE: 97.2 F | SYSTOLIC BLOOD PRESSURE: 128 MMHG | WEIGHT: 315 LBS | HEIGHT: 75 IN | HEART RATE: 74 BPM | OXYGEN SATURATION: 92 %

## 2020-03-13 DIAGNOSIS — Z79.01 CHRONIC ANTICOAGULATION: ICD-10-CM

## 2020-03-13 DIAGNOSIS — I48.20 CHRONIC ATRIAL FIBRILLATION (HCC): ICD-10-CM

## 2020-03-13 DIAGNOSIS — M43.6 TORTICOLLIS, ACUTE: ICD-10-CM

## 2020-03-13 PROCEDURE — 99214 OFFICE O/P EST MOD 30 MIN: CPT | Performed by: PHYSICIAN ASSISTANT

## 2020-03-13 RX ORDER — NAPROXEN 375 MG/1
TABLET ORAL
Qty: 30 TAB | Refills: 0 | Status: SHIPPED
Start: 2020-03-13 | End: 2020-10-23

## 2020-03-13 RX ORDER — CYCLOBENZAPRINE HCL 5 MG
TABLET ORAL
Qty: 15 TAB | Refills: 0 | Status: SHIPPED
Start: 2020-03-13 | End: 2020-10-23

## 2020-03-13 ASSESSMENT — FIBROSIS 4 INDEX: FIB4 SCORE: 4.1

## 2020-03-13 ASSESSMENT — ENCOUNTER SYMPTOMS
TINGLING: 0
NECK PAIN: 1
FOCAL WEAKNESS: 0
SENSORY CHANGE: 0
FEVER: 0

## 2020-03-13 NOTE — PATIENT INSTRUCTIONS
Acute Torticollis  Torticollis is a condition in which the muscles of the neck tighten (contract) abnormally, causing the neck to twist and the head to move into an unnatural position. Torticollis that develops suddenly is called acute torticollis. If torticollis becomes chronic and is left untreated, the face and neck can become deformed.  CAUSES  This condition may be caused by:  · Sleeping in an awkward position (common).  · Extending or twisting the neck muscles beyond their normal position.  · Infection.  In some cases, the cause may not be known.  SYMPTOMS  Symptoms of this condition include:  · An unnatural position of the head.  · Neck pain.  · A limited ability to move the neck.  · Twisting of the neck to one side.  DIAGNOSIS  This condition is diagnosed with a physical exam. You may also have imaging tests, such as an X-ray, CT scan, or MRI.  TREATMENT  Treatment for this condition involves trying to relax the neck muscles. It may include:  · Medicines or shots.  · Physical therapy.  · Surgery. This may be done in severe cases.  HOME CARE INSTRUCTIONS  · Take medicines only as directed by your health care provider.  · Do stretching exercises and massage your neck as directed by your health care provider.  · Keep all follow-up visits as directed by your health care provider. This is important.  SEEK MEDICAL CARE IF:  · You develop a fever.  SEEK IMMEDIATE MEDICAL CARE IF:  · You develop difficulty breathing.  · You develop noisy breathing (stridor).  · You start drooling.  · You have trouble swallowing or have pain with swallowing.  · You develop numbness or weakness in your hands or feet.  · You have changes in your speech, understanding, or vision.  · Your pain gets worse.  This information is not intended to replace advice given to you by your health care provider. Make sure you discuss any questions you have with your health care provider.  Document Released: 12/15/2001 Document Revised: 04/10/2017  Document Reviewed: 12/14/2015  [employer] Interactive Patient Education © 2017 [employer]

## 2020-03-13 NOTE — PROGRESS NOTES
"Subjective:   Praveen Khalil  is a 69 y.o. male who presents for Neck Pain (x3 days, pt denies injury.)    This is a new problem.  Patient presents to urgent care with 3-day history of neck pain.  Patient denies any injury.  Patient reports that he woke 1 morning with a \"crick in the neck\" on the left side of his neck.  This has progressed into pain on the right side of the neck as well with stiffness.  Patient has been taking some over-the-counter Aleve and using heat and topical analgesic with no real improvement in symptoms.  Patient denies any numbness, tingling or weakness of the extremities.  He did attempt to see his chiropractor however they do not have an available appointment today.  Therefore, he presents for further evaluation and management in urgent care.    Patient does quite have chronic health issues to include but not limited to chronic anticoagulation with aspirin, chronic atrial fibrillation with pacemaker placement, chronic amiodarone therapy.      Neck Pain    Pertinent negatives include no fever or tingling.     Review of Systems   Constitutional: Negative for fever.   Musculoskeletal: Positive for neck pain.   Neurological: Negative for tingling, sensory change and focal weakness.   All other systems reviewed and are negative.    Allergies   Allergen Reactions   • Nkda [No Known Drug Allergy]      Reviewed past medical, surgical , social and family history.  Reviewed prescription and over-the-counter medications with patient and electronic health record today.     Objective:   /72   Pulse 74   Temp 36.2 °C (97.2 °F) (Temporal)   Resp 18   Ht 1.905 m (6' 3\")   Wt (!) 156.5 kg (345 lb)   SpO2 92%   BMI 43.12 kg/m²   Physical Exam  Vitals signs reviewed.   Constitutional:       Appearance: He is well-developed. He is not ill-appearing or toxic-appearing.   HENT:      Head: Normocephalic and atraumatic.      Right Ear: Tympanic membrane, ear canal and external ear normal.   "  Left Ear: Tympanic membrane, ear canal and external ear normal.      Nose: Nose normal.      Mouth/Throat:      Lips: Pink.      Mouth: Mucous membranes are moist.      Pharynx: Uvula midline. No oropharyngeal exudate.   Eyes:      Conjunctiva/sclera: Conjunctivae normal.      Pupils: Pupils are equal, round, and reactive to light.   Neck:      Musculoskeletal: Neck supple. Decreased range of motion. Pain with movement, torticollis and muscular tenderness present. No neck rigidity or spinous process tenderness.     Cardiovascular:      Rate and Rhythm: Normal rate and regular rhythm.      Heart sounds: Normal heart sounds. No murmur. No friction rub.   Pulmonary:      Effort: Pulmonary effort is normal. No respiratory distress.      Breath sounds: Normal breath sounds.   Abdominal:      General: Bowel sounds are normal.      Palpations: Abdomen is soft.      Tenderness: There is no abdominal tenderness.   Lymphadenopathy:      Head:      Right side of head: No submental, submandibular or tonsillar adenopathy.      Left side of head: No submental, submandibular or tonsillar adenopathy.      Cervical: No cervical adenopathy.      Upper Body:      Right upper body: No supraclavicular adenopathy.      Left upper body: No supraclavicular adenopathy.   Skin:     General: Skin is warm and dry.      Findings: No rash.   Neurological:      Mental Status: He is alert and oriented to person, place, and time.      Cranial Nerves: Cranial nerves are intact. No cranial nerve deficit.      Sensory: Sensation is intact. No sensory deficit.      Motor: Motor function is intact.      Coordination: Coordination is intact. Coordination normal.      Gait: Gait is intact.      Deep Tendon Reflexes:      Reflex Scores:       Bicep reflexes are 2+ on the right side and 2+ on the left side.       Brachioradialis reflexes are 2+ on the right side and 2+ on the left side.       Patellar reflexes are 2+ on the right side and 2+ on the left  side.  Psychiatric:         Attention and Perception: Attention normal.         Mood and Affect: Mood normal.         Speech: Speech normal.         Behavior: Behavior normal.         Thought Content: Thought content normal.         Judgment: Judgment normal.           Assessment/Plan:   1. Torticollis, acute  - naproxen (NAPROSYN) 375 MG Tab; Take 1 tab po bid PRN pain  Dispense: 30 Tab; Refill: 0  - cyclobenzaprine (FLEXERIL) 5 MG tablet; Take 1 tab po qhs prn pain/muscle spasm  Dispense: 15 Tab; Refill: 0    2. Chronic anticoagulation    3. Chronic atrial fibrillation    Patient denies any history of chronic kidney disease.  Patient is taking daily baby aspirin.  Counseled patient on using anti-inflammatory medication with caution in conjunction with aspirin as it is encouraged to reach out to his primary care regarding this potential issue.  Unfortunately, we are very limited in medication treatment options for this patient.  I did discuss with the patient the risk associated with use of muscle relaxers in patients of advanced age.  However, given lack of other treatment options I believe it is reasonable to prescribe the patient a very low dose of Flexeril for nighttime use only.  Patient is cautioned on being careful getting up from a lying or seated position to ensure he is not experiencing any dizziness prior to movement.  Recommend follow-up with primary care for continued symptoms.  Differential diagnosis, natural history, supportive care, and indications for immediate follow-up discussed.     Red flag warning symptoms and strict ER/follow-up precautions given.  The patient demonstrated a good understanding and agreed with the treatment plan.  Please note that this note was created using voice recognition speech to text software. Every effort has been made to correct obvious errors.  However, I expect there are errors of grammar and possibly context that were not discovered prior to finalizing the note  S.  YOGESH Mcguire

## 2020-10-23 ENCOUNTER — PRE-ADMISSION TESTING (OUTPATIENT)
Dept: ADMISSIONS | Facility: MEDICAL CENTER | Age: 70
End: 2020-10-23
Attending: INTERNAL MEDICINE
Payer: MEDICARE

## 2020-10-23 DIAGNOSIS — Z01.810 PRE-OPERATIVE CARDIOVASCULAR EXAMINATION: ICD-10-CM

## 2020-10-23 DIAGNOSIS — Z01.812 PRE-OPERATIVE LABORATORY EXAMINATION: ICD-10-CM

## 2020-10-23 LAB
COVID ORDER STATUS COVID19: NORMAL
EKG IMPRESSION: NORMAL

## 2020-10-23 PROCEDURE — U0003 INFECTIOUS AGENT DETECTION BY NUCLEIC ACID (DNA OR RNA); SEVERE ACUTE RESPIRATORY SYNDROME CORONAVIRUS 2 (SARS-COV-2) (CORONAVIRUS DISEASE [COVID-19]), AMPLIFIED PROBE TECHNIQUE, MAKING USE OF HIGH THROUGHPUT TECHNOLOGIES AS DESCRIBED BY CMS-2020-01-R: HCPCS

## 2020-10-23 PROCEDURE — C9803 HOPD COVID-19 SPEC COLLECT: HCPCS

## 2020-10-23 PROCEDURE — 93010 ELECTROCARDIOGRAM REPORT: CPT | Performed by: INTERNAL MEDICINE

## 2020-10-23 PROCEDURE — 93005 ELECTROCARDIOGRAM TRACING: CPT

## 2020-10-23 RX ORDER — METOPROLOL SUCCINATE 100 MG/1
200 TABLET, EXTENDED RELEASE ORAL
COMMUNITY

## 2020-10-23 RX ORDER — AMIODARONE HYDROCHLORIDE 200 MG/1
200 TABLET ORAL EVERY MORNING
COMMUNITY
Start: 2020-09-22

## 2020-10-23 SDOH — HEALTH STABILITY: MENTAL HEALTH: HOW OFTEN DO YOU HAVE A DRINK CONTAINING ALCOHOL?: MONTHLY OR LESS

## 2020-10-23 SDOH — HEALTH STABILITY: MENTAL HEALTH: HOW MANY STANDARD DRINKS CONTAINING ALCOHOL DO YOU HAVE ON A TYPICAL DAY?: 1 OR 2

## 2020-10-23 SDOH — HEALTH STABILITY: MENTAL HEALTH: HOW OFTEN DO YOU HAVE 6 OR MORE DRINKS ON ONE OCCASION?: LESS THAN MONTHLY

## 2020-10-23 ASSESSMENT — FIBROSIS 4 INDEX: FIB4 SCORE: 4.16

## 2020-10-23 NOTE — OR NURSING
"Pre-admit appointment completed. \"Preparing for your procedure\" sheet given to pt along with verbal and written instructions. Pt instructed to continue regularly prescribed medications through the day before surgery. Pt instructed to take the following medications the day of surgery with a sip of water, per anesthesia protocol; cordarone, synthroid, and if needed-xopenex MDI.    Pt to bring MDI and BiPAP DOS.    Pt states labs done 5/1/20 @ PCP office. Request faxed to office to please fax (as was on long terma hold when called) but informed pt if not received, will need to draw DOS. Pt voices understanding. States he knows he had A1C done with the labs.    Pacemaker request for procedure form faxed to Cardiology office with request for clearance note too if they have. Called cardiology office and was told Dr Ureña signed form and nurse faxed it with clearance note to GI center today.     Pt to get COVID test today.       Pt given instructions to self isolate after COVID test and to contact doctor if any symptoms of COVID occur.    Dr Bello notified via email of procedure due to est BMI=43.75 (6'3\" & states 350 lbs), pacemaker/AICD, HTN (states well controlled), CHF, and other co morbidities.  "

## 2020-10-24 LAB
SARS-COV-2 RNA RESP QL NAA+PROBE: NOTDETECTED
SPECIMEN SOURCE: NORMAL

## 2020-10-26 NOTE — OR NURSING
RE: correspondence between Lisa Closson, RN and Dr. Bello on 10/23. See NSG note from 5/23.    If the labs were from 5/1/20 as stated on this email, then we will need new ones given patients comorbidities. Otherwise OK to proceed from my standpoint pending the cardiology note that is presumably already sent. Thank you.  Ariana Bello M.D.  [facility]

## 2020-10-27 ENCOUNTER — ANESTHESIA EVENT (OUTPATIENT)
Dept: SURGERY | Facility: MEDICAL CENTER | Age: 70
End: 2020-10-27
Payer: MEDICARE

## 2020-10-27 ENCOUNTER — HOSPITAL ENCOUNTER (OUTPATIENT)
Facility: MEDICAL CENTER | Age: 70
End: 2020-10-27
Attending: INTERNAL MEDICINE | Admitting: INTERNAL MEDICINE
Payer: MEDICARE

## 2020-10-27 ENCOUNTER — ANESTHESIA (OUTPATIENT)
Dept: SURGERY | Facility: MEDICAL CENTER | Age: 70
End: 2020-10-27
Payer: MEDICARE

## 2020-10-27 VITALS
HEART RATE: 70 BPM | OXYGEN SATURATION: 92 % | RESPIRATION RATE: 18 BRPM | SYSTOLIC BLOOD PRESSURE: 149 MMHG | DIASTOLIC BLOOD PRESSURE: 88 MMHG | WEIGHT: 315 LBS | TEMPERATURE: 97.7 F | BODY MASS INDEX: 39.17 KG/M2 | HEIGHT: 75 IN

## 2020-10-27 LAB
ANION GAP SERPL CALC-SCNC: 12 MMOL/L (ref 7–16)
BUN SERPL-MCNC: 15 MG/DL (ref 8–22)
CALCIUM SERPL-MCNC: 8.8 MG/DL (ref 8.4–10.2)
CHLORIDE SERPL-SCNC: 107 MMOL/L (ref 96–112)
CO2 SERPL-SCNC: 22 MMOL/L (ref 20–33)
CREAT SERPL-MCNC: 0.79 MG/DL (ref 0.5–1.4)
ERYTHROCYTE [DISTWIDTH] IN BLOOD BY AUTOMATED COUNT: 54.4 FL (ref 35.9–50)
GLUCOSE SERPL-MCNC: 81 MG/DL (ref 65–99)
HCT VFR BLD AUTO: 41.1 % (ref 42–52)
HGB BLD-MCNC: 13.8 G/DL (ref 14–18)
MCH RBC QN AUTO: 31.8 PG (ref 27–33)
MCHC RBC AUTO-ENTMCNC: 33.6 G/DL (ref 33.7–35.3)
MCV RBC AUTO: 94.7 FL (ref 81.4–97.8)
PATHOLOGY CONSULT NOTE: NORMAL
PLATELET # BLD AUTO: 98 K/UL (ref 164–446)
PMV BLD AUTO: 11.1 FL (ref 9–12.9)
POTASSIUM SERPL-SCNC: 4.3 MMOL/L (ref 3.6–5.5)
RBC # BLD AUTO: 4.34 M/UL (ref 4.7–6.1)
SODIUM SERPL-SCNC: 141 MMOL/L (ref 135–145)
WBC # BLD AUTO: 7.4 K/UL (ref 4.8–10.8)

## 2020-10-27 PROCEDURE — 502240 HCHG MISC OR SUPPLY RC 0272: Performed by: INTERNAL MEDICINE

## 2020-10-27 PROCEDURE — 700111 HCHG RX REV CODE 636 W/ 250 OVERRIDE (IP): Performed by: ANESTHESIOLOGY

## 2020-10-27 PROCEDURE — 85027 COMPLETE CBC AUTOMATED: CPT

## 2020-10-27 PROCEDURE — 160048 HCHG OR STATISTICAL LEVEL 1-5: Performed by: INTERNAL MEDICINE

## 2020-10-27 PROCEDURE — 160203 HCHG ENDO MINUTES - 1ST 30 MINS LEVEL 4: Performed by: INTERNAL MEDICINE

## 2020-10-27 PROCEDURE — 160036 HCHG PACU - EA ADDL 30 MINS PHASE I: Performed by: INTERNAL MEDICINE

## 2020-10-27 PROCEDURE — 80048 BASIC METABOLIC PNL TOTAL CA: CPT

## 2020-10-27 PROCEDURE — 700105 HCHG RX REV CODE 258: Performed by: INTERNAL MEDICINE

## 2020-10-27 PROCEDURE — 160002 HCHG RECOVERY MINUTES (STAT): Performed by: INTERNAL MEDICINE

## 2020-10-27 PROCEDURE — 160208 HCHG ENDO MINUTES - EA ADDL 1 MIN LEVEL 4: Performed by: INTERNAL MEDICINE

## 2020-10-27 PROCEDURE — 88305 TISSUE EXAM BY PATHOLOGIST: CPT

## 2020-10-27 PROCEDURE — 160009 HCHG ANES TIME/MIN: Performed by: INTERNAL MEDICINE

## 2020-10-27 PROCEDURE — 700101 HCHG RX REV CODE 250: Performed by: INTERNAL MEDICINE

## 2020-10-27 PROCEDURE — 160025 RECOVERY II MINUTES (STATS): Performed by: INTERNAL MEDICINE

## 2020-10-27 PROCEDURE — 160046 HCHG PACU - 1ST 60 MINS PHASE II: Performed by: INTERNAL MEDICINE

## 2020-10-27 PROCEDURE — 160035 HCHG PACU - 1ST 60 MINS PHASE I: Performed by: INTERNAL MEDICINE

## 2020-10-27 RX ORDER — SODIUM CHLORIDE, SODIUM LACTATE, POTASSIUM CHLORIDE, CALCIUM CHLORIDE 600; 310; 30; 20 MG/100ML; MG/100ML; MG/100ML; MG/100ML
1000 INJECTION, SOLUTION INTRAVENOUS
Status: DISCONTINUED | OUTPATIENT
Start: 2020-10-27 | End: 2020-10-27 | Stop reason: HOSPADM

## 2020-10-27 RX ORDER — DIPHENHYDRAMINE HYDROCHLORIDE 50 MG/ML
12.5 INJECTION INTRAMUSCULAR; INTRAVENOUS
Status: DISCONTINUED | OUTPATIENT
Start: 2020-10-27 | End: 2020-10-27 | Stop reason: HOSPADM

## 2020-10-27 RX ORDER — HYDROMORPHONE HYDROCHLORIDE 1 MG/ML
0.4 INJECTION, SOLUTION INTRAMUSCULAR; INTRAVENOUS; SUBCUTANEOUS
Status: DISCONTINUED | OUTPATIENT
Start: 2020-10-27 | End: 2020-10-27 | Stop reason: HOSPADM

## 2020-10-27 RX ORDER — HYDROMORPHONE HYDROCHLORIDE 1 MG/ML
0.1 INJECTION, SOLUTION INTRAMUSCULAR; INTRAVENOUS; SUBCUTANEOUS
Status: DISCONTINUED | OUTPATIENT
Start: 2020-10-27 | End: 2020-10-27 | Stop reason: HOSPADM

## 2020-10-27 RX ORDER — SODIUM CHLORIDE, SODIUM LACTATE, POTASSIUM CHLORIDE, CALCIUM CHLORIDE 600; 310; 30; 20 MG/100ML; MG/100ML; MG/100ML; MG/100ML
INJECTION, SOLUTION INTRAVENOUS CONTINUOUS
Status: DISCONTINUED | OUTPATIENT
Start: 2020-10-27 | End: 2020-10-27 | Stop reason: HOSPADM

## 2020-10-27 RX ORDER — OXYCODONE HCL 5 MG/5 ML
5 SOLUTION, ORAL ORAL
Status: DISCONTINUED | OUTPATIENT
Start: 2020-10-27 | End: 2020-10-27 | Stop reason: HOSPADM

## 2020-10-27 RX ORDER — METOPROLOL TARTRATE 1 MG/ML
1 INJECTION, SOLUTION INTRAVENOUS
Status: DISCONTINUED | OUTPATIENT
Start: 2020-10-27 | End: 2020-10-27 | Stop reason: HOSPADM

## 2020-10-27 RX ORDER — ONDANSETRON 2 MG/ML
INJECTION INTRAMUSCULAR; INTRAVENOUS PRN
Status: DISCONTINUED | OUTPATIENT
Start: 2020-10-27 | End: 2020-10-27 | Stop reason: SURG

## 2020-10-27 RX ORDER — MEPERIDINE HYDROCHLORIDE 25 MG/ML
12.5 INJECTION INTRAMUSCULAR; INTRAVENOUS; SUBCUTANEOUS
Status: DISCONTINUED | OUTPATIENT
Start: 2020-10-27 | End: 2020-10-27 | Stop reason: HOSPADM

## 2020-10-27 RX ORDER — ONDANSETRON 2 MG/ML
4 INJECTION INTRAMUSCULAR; INTRAVENOUS
Status: DISCONTINUED | OUTPATIENT
Start: 2020-10-27 | End: 2020-10-27 | Stop reason: HOSPADM

## 2020-10-27 RX ORDER — HALOPERIDOL 5 MG/ML
1 INJECTION INTRAMUSCULAR
Status: DISCONTINUED | OUTPATIENT
Start: 2020-10-27 | End: 2020-10-27 | Stop reason: HOSPADM

## 2020-10-27 RX ORDER — OXYCODONE HCL 5 MG/5 ML
10 SOLUTION, ORAL ORAL
Status: DISCONTINUED | OUTPATIENT
Start: 2020-10-27 | End: 2020-10-27 | Stop reason: HOSPADM

## 2020-10-27 RX ORDER — HYDROMORPHONE HYDROCHLORIDE 1 MG/ML
0.2 INJECTION, SOLUTION INTRAMUSCULAR; INTRAVENOUS; SUBCUTANEOUS
Status: DISCONTINUED | OUTPATIENT
Start: 2020-10-27 | End: 2020-10-27 | Stop reason: HOSPADM

## 2020-10-27 RX ORDER — MIDAZOLAM HYDROCHLORIDE 1 MG/ML
1 INJECTION INTRAMUSCULAR; INTRAVENOUS
Status: DISCONTINUED | OUTPATIENT
Start: 2020-10-27 | End: 2020-10-27 | Stop reason: HOSPADM

## 2020-10-27 RX ORDER — HYDRALAZINE HYDROCHLORIDE 20 MG/ML
5 INJECTION INTRAMUSCULAR; INTRAVENOUS
Status: DISCONTINUED | OUTPATIENT
Start: 2020-10-27 | End: 2020-10-27 | Stop reason: HOSPADM

## 2020-10-27 RX ADMIN — SODIUM CHLORIDE, POTASSIUM CHLORIDE, SODIUM LACTATE AND CALCIUM CHLORIDE 1000 ML: 600; 310; 30; 20 INJECTION, SOLUTION INTRAVENOUS at 12:41

## 2020-10-27 RX ADMIN — SODIUM CHLORIDE, POTASSIUM CHLORIDE, SODIUM LACTATE AND CALCIUM CHLORIDE: 600; 310; 30; 20 INJECTION, SOLUTION INTRAVENOUS at 13:46

## 2020-10-27 RX ADMIN — FENTANYL CITRATE 100 MCG: 50 INJECTION, SOLUTION INTRAMUSCULAR; INTRAVENOUS at 14:04

## 2020-10-27 RX ADMIN — PROPOFOL 150 MCG/KG/MIN: 10 INJECTION, EMULSION INTRAVENOUS at 14:03

## 2020-10-27 RX ADMIN — ONDANSETRON 4 MG: 2 INJECTION INTRAMUSCULAR; INTRAVENOUS at 14:03

## 2020-10-27 RX ADMIN — LIDOCAINE HYDROCHLORIDE 0.5 ML: 10 INJECTION, SOLUTION INFILTRATION; PERINEURAL at 12:41

## 2020-10-27 RX ADMIN — WATER 15 ML: 100 IRRIGANT IRRIGATION at 12:41

## 2020-10-27 RX ADMIN — MIDAZOLAM HYDROCHLORIDE 2 MG: 1 INJECTION, SOLUTION INTRAMUSCULAR; INTRAVENOUS at 14:03

## 2020-10-27 RX ADMIN — PROPOFOL 50 MG: 10 INJECTION, EMULSION INTRAVENOUS at 14:03

## 2020-10-27 ASSESSMENT — PAIN SCALES - GENERAL: PAIN_LEVEL: 2

## 2020-10-27 ASSESSMENT — FIBROSIS 4 INDEX: FIB4 SCORE: 4.16

## 2020-10-27 NOTE — OR SURGEON
Immediate Post OP Note    PreOp Diagnosis: Colon polyp surveillance colonoscopy.     PostOp Diagnosis:   1. 5-6mm colon polyps x 10.  2. L sided diverticulosis  3. Small to medium internal hemorrhoids.     Procedure(s):  COLONOSCOPY - Wound Class: Clean Contaminated    Surgeon(s):  Yassine Nicolas M.D.    Anesthesiologist/Type of Anesthesia:  Anesthesiologist: Francisco Ortega M.D./* No anesthesia type entered *    Surgical Staff:  Circulator: Giovani Lester R.N.  Endoscopy Technician: Jocelyne Rojas    Specimens removed if any:  ID Type Source Tests Collected by Time Destination   A : hepatic flexure polyp bx Tissue Colon PATHOLOGY SPECIMEN Yassine Nicolas M.D. 10/27/2020  2:06 PM    B : polyp bx Tissue Colon - Transverse PATHOLOGY SPECIMEN Yassine Nicolas M.D. 10/27/2020  2:16 PM        Estimated Blood Loss: None.     Findings: see above.     Complications: None immediate.   Plan:  1. I'll contact him by letter with pathology results.   2. No future colonoscopy planned due to his multiple comorbidities.         10/27/2020 2:38 PM Yassine Nicolas M.D.

## 2020-10-27 NOTE — ANESTHESIA TIME REPORT
Anesthesia Start and Stop Event Times     Date Time Event    10/27/2020 1343 Ready for Procedure     1346 Anesthesia Start        Responsible Staff  10/27/20    Name Role Begin End    Francisco Ortega M.D. Anesth 1346         Preop Diagnosis (Free Text):  Pre-op Diagnosis     PERSONAL HISTORY OF COLONIC POLYPS        Preop Diagnosis (Codes):    Post op Diagnosis  Hx of colonic polyps      Premium Reason  Non-Premium    Comments:

## 2020-10-27 NOTE — DISCHARGE INSTRUCTIONS
ENDOSCOPY HOME CARE INSTRUCTIONS      COLONOSCOPY OR FLEXIBLE SIGMOIDOSCOPY  1. If you received a barium enema, take a mild laxative such as dulcolax, Lanette's M.O., or Milk of Magnesia to clean out the barium.  2. Drink plenty of fluids. Eat a diet high in fiber (whole-grain breads, fresh fruit and vegetables).  3. You may notice a few drops of blood with your first bowel movement. If you develop any large amount of bleeding, black stools, a fever, or abdominal pain, call your doctor right away.  4. Call your doctor for test results in {SRG NUMBER OF DAYS:7498447} {SRG DAY WEEK MONTH:9180771}.  5. Don't drive or drink alcohol for 24 hours. The medication you received will make you too drowsy.  6. No heavy lifting, no Aspirin products or Aspirin for 5 days ***  7. Additional instructions: ***  8. Prescriptions: ***    Resume regular diet and home meds.   I'll contact him with results by letter.   NO driving today. Can drive tomorrow 10/28.    Dr. Nicolas [phone]    You should call 911 if you develop problems with breathing or chest pain.  If any questions arise, call your doctor. If your doctor is not available, please feel free to call [phone]. You can also call the HEALTH HOTLINE open 24 hours/day, 7 days/week and speak to a nurse at [phone], or toll free [phone].    Depression / Suicide Risk    As you are discharged from this [facility] facility, it is important to learn how to keep safe from harming yourself.    Recognize the warning signs:  · Abrupt changes in personality, positive or negative- including increase in energy   · Giving away possessions  · Change in eating patterns- significant weight changes-  positive or negative  · Change in sleeping patterns- unable to sleep or sleeping all the time   · Unwillingness or inability to communicate  · Depression  · Unusual sadness, discouragement and loneliness  · Talk of wanting to die  · Neglect of personal  appearance   · Rebelliousness- reckless behavior  · Withdrawal from people/activities they love  · Confusion- inability to concentrate     If you or a loved one observes any of these behaviors or has concerns about self-harm, here's what you can do:  · Talk about it- your feelings and reasons for harming yourself  · Remove any means that you might use to hurt yourself (examples: pills, rope, extension cords, firearm)  · Get professional help from the community (Mental Health, Substance Abuse, psychological counseling)  · Do not be alone:Call your Safe Contact- someone whom you trust who will be there for you.  · Call your local CRISIS HOTLINE 288-2003 or 618-849-2342  · Call your local Children's Mobile Crisis Response Team [place] (982) 885-3204 or www.Impact  · Call the toll free National Suicide Prevention Hotlines   · National Suicide Prevention Lifeline 631-265-JYKV (9009)  · National Hope Line Network 800-SUICIDE (603-5228)    I acknowledge receipt and understanding of these Home Care Instructions.

## 2020-10-27 NOTE — ANESTHESIA PREPROCEDURE EVALUATION
Relevant Problems   ANESTHESIA   (+) TAZ (obstructive sleep apnea)      PULMONARY   (+) COPD (chronic obstructive pulmonary disease) ([facility])      NEURO   (+) History of pulmonary embolism      CARDIAC   (+) Atrial fibrillation ([facility])   (+) DVT (deep venous thrombosis) ([facility])   (+) Essential hypertension, malignant      ENDO   (+) Hypothyroidism      Other   (+) Gout, arthritis       Physical Exam    Airway   Mallampati: II  TM distance: >3 FB  Neck ROM: full       Cardiovascular - normal exam  Rhythm: regular  Rate: normal  (-) murmur     Dental - normal exam           Pulmonary - normal exam  Breath sounds clear to auscultation     Abdominal    Neurological - normal exam         Other findings: MO            Anesthesia Plan    ASA 3   ASA physical status 3 criteria: implanted pacemaker    Plan - general       Airway plan will be natural airway        Induction: intravenous    Postoperative Plan: Postoperative administration of opioids is intended.    Pertinent diagnostic labs and testing reviewed    Informed Consent:    Anesthetic plan and risks discussed with patient.    Use of blood products discussed with: patient whom consented to blood products.

## 2020-10-27 NOTE — OR NURSING
1435: To PACU post colonoscopy. Pt is awake. No pain or nausea.  1500: No change.  1547: Meets criteria for stage ll.

## 2020-10-27 NOTE — OR NURSING
Pt allergies and NPO status verified. Home medications reconciled. Belongings secured. Pt verbalizes understanding of pain scale, expected course of stay and plan of care. Endo procedure verified with pt. IV access established. Triple AIM completed. All questions answered. Bed in low position. Call light in reach.

## 2020-10-27 NOTE — ANESTHESIA POSTPROCEDURE EVALUATION
Patient: Praveen Khalil    Procedure Summary     Date: 10/27/20 Room / Location:  ENDOSCOPIC ULTRASOUND ROOM / SURGERY [facility]    Anesthesia Start: 1346 Anesthesia Stop: 1436    Procedure: COLONOSCOPY Diagnosis:       Colon polyp      (Colon polyp [[id number]])    Surgeon: Yassine Nicolas M.D. Responsible Provider: Francisco Ortega M.D.    Anesthesia Type: general ASA Status: 3          Final Anesthesia Type: general  Last vitals  BP   Blood Pressure : 153/73    Temp   36.6 °C (97.9 °F)    Pulse   Pulse: 83   Resp   (!) 24    SpO2   90 %      Anesthesia Post Evaluation    Patient location during evaluation: PACU  Patient participation: complete - patient participated  Level of consciousness: awake and alert  Pain score: 2    Airway patency: patent  Anesthetic complications: no  Cardiovascular status: hemodynamically stable  Respiratory status: acceptable  Hydration status: euvolemic    PONV: none           Nurse Pain Score: 0 (NPRS)

## 2020-10-28 NOTE — PROCEDURES
DATE OF SERVICE:  10/27/2020    PROCEDURE:  Outpatient Colonoscopy.    INDICATION:  A 70-year-old male with a history of colon polyps. He is here for   surveillance colonoscopy.  He also reports some intermittent anal seepage.    ASA:  Class III.    SEDATION:  Monitored anesthesia care per Dr. Ortega.    FINDINGS:  1.  A 5-6 mm sessile colon polyps x10, all cold snare resected.  2.  Left-sided diverticulosis.  3.  Small to medium internal hemorrhoids.    DESCRIPTION OF PROCEDURE:  After informed consent and timeout, he was   administered IV sedation.  Once he was comfortable, a rectal exam was done,   which was normal.  The Olympus flexible video colonoscope was advanced into   the rectum and proximally to the cecum, which was identified by the   appendiceal orifice and the ileocecal valve.  The prep overall was excellent.    Scope was then withdrawn while the mucosa was examined.  At the hepatic   flexure at 90, there was a group of 5-6 mm sessile polyps, which were all cold   snare resected.  In the mid transverse colon at 70 cm, there were 4 more 5-6   mm sessile polyps, all cold snare resected.  There was a moderate amount of   left-sided diverticulosis.  Retroflexed view of the rectum showed some small   to medium hemorrhoids.  He tolerated the procedure well and went to recovery   room in stable condition.    IMPRESSION:  1.  Multiple colon polyps.  2.  Left-sided diverticulosis.  3.  Small to medium internal hemorrhoids.    PLAN:  1.  I will contact him by letter with pathology results.  2.  No future colonoscopy planned due to his multiple comorbidities.  He is at   a point where the risks of future sedation and invasive procedures would   outweigh any benefits.       ____________________________________     MD KEVIN LENZ / MONIQUE    DD:  10/27/2020 14:43:15  DT:  10/27/2020 17:41:05    D#:  [id number]  Job#:  [id number]    cc: GILLIAN WALTERS DO

## 2020-12-24 ENCOUNTER — OFFICE VISIT (OUTPATIENT)
Dept: URGENT CARE | Facility: CLINIC | Age: 70
End: 2020-12-24
Payer: MEDICARE

## 2020-12-24 ENCOUNTER — HOSPITAL ENCOUNTER (OUTPATIENT)
Facility: MEDICAL CENTER | Age: 70
End: 2020-12-24
Attending: NURSE PRACTITIONER
Payer: MEDICARE

## 2020-12-24 VITALS
RESPIRATION RATE: 18 BRPM | SYSTOLIC BLOOD PRESSURE: 140 MMHG | OXYGEN SATURATION: 94 % | TEMPERATURE: 97.5 F | DIASTOLIC BLOOD PRESSURE: 94 MMHG | HEART RATE: 77 BPM | BODY MASS INDEX: 39.17 KG/M2 | HEIGHT: 75 IN | WEIGHT: 315 LBS

## 2020-12-24 DIAGNOSIS — R10.9 FLANK PAIN: ICD-10-CM

## 2020-12-24 DIAGNOSIS — R10.9 ACUTE RIGHT FLANK PAIN: ICD-10-CM

## 2020-12-24 DIAGNOSIS — H10.31 ACUTE CONJUNCTIVITIS OF RIGHT EYE, UNSPECIFIED ACUTE CONJUNCTIVITIS TYPE: ICD-10-CM

## 2020-12-24 LAB
APPEARANCE UR: CLEAR
BILIRUB UR STRIP-MCNC: NORMAL MG/DL
COLOR UR AUTO: NORMAL
GLUCOSE UR STRIP.AUTO-MCNC: NORMAL MG/DL
KETONES UR STRIP.AUTO-MCNC: NORMAL MG/DL
LEUKOCYTE ESTERASE UR QL STRIP.AUTO: NORMAL
NITRITE UR QL STRIP.AUTO: NORMAL
PH UR STRIP.AUTO: 5.5 [PH] (ref 5–8)
PROT UR QL STRIP: 100 MG/DL
RBC UR QL AUTO: NORMAL
SP GR UR STRIP.AUTO: 1.02
UROBILINOGEN UR STRIP-MCNC: 1 MG/DL

## 2020-12-24 PROCEDURE — 99214 OFFICE O/P EST MOD 30 MIN: CPT | Performed by: NURSE PRACTITIONER

## 2020-12-24 PROCEDURE — 81002 URINALYSIS NONAUTO W/O SCOPE: CPT | Performed by: NURSE PRACTITIONER

## 2020-12-24 PROCEDURE — 87086 URINE CULTURE/COLONY COUNT: CPT

## 2020-12-24 RX ORDER — TOBRAMYCIN AND DEXAMETHASONE 3; 1 MG/ML; MG/ML
1 SUSPENSION/ DROPS OPHTHALMIC
Qty: 10 ML | Refills: 0 | Status: SHIPPED | OUTPATIENT
Start: 2020-12-24

## 2020-12-24 ASSESSMENT — ENCOUNTER SYMPTOMS
VOMITING: 0
FEVER: 0
FOREIGN BODY SENSATION: 1
NAUSEA: 0
DIARRHEA: 0
MYALGIAS: 0
DOUBLE VISION: 0
CHILLS: 0
EYE DISCHARGE: 0
ABDOMINAL PAIN: 0
COUGH: 0
HEADACHES: 0
BLURRED VISION: 1
FLANK PAIN: 1
EYE REDNESS: 0
BACK PAIN: 1
EYE PAIN: 1
PHOTOPHOBIA: 0

## 2020-12-24 ASSESSMENT — VISUAL ACUITY: OU: 1

## 2020-12-24 ASSESSMENT — FIBROSIS 4 INDEX: FIB4 SCORE: 3.65

## 2020-12-24 NOTE — PATIENT INSTRUCTIONS
Kidney Stones    Kidney stones (urolithiasis) are solid, rock-like deposits that form inside of the organs that make urine (kidneys). A kidney stone may form in a kidney and move into the bladder, where it can cause intense pain and block the flow of urine. Kidney stones are created when high levels of certain minerals are found in the urine. They are usually passed through urination, but in some cases, medical treatment may be needed to remove them.  What are the causes?  Kidney stones may be caused by:  · A condition in which certain glands produce too much parathyroid hormone (primary hyperparathyroidism), which causes too much calcium buildup in the blood.  · Buildup of uric acid crystals in the bladder (hyperuricosuria). Uric acid is a chemical that the body produces when you eat certain foods. It usually exits the body in the urine.  · Narrowing (stricture) of one or both of the tubes that drain urine from the kidneys to the bladder (ureters).  · A kidney blockage that is present at birth (congenital obstruction).  · Past surgery on the kidney or the ureters, such as gastric bypass surgery.  What increases the risk?  The following factors make you more likely to develop kidney stones:  · Having had a kidney stone in the past.  · Having a family history of kidney stones.  · Not drinking enough water.  · Eating a diet that is high in protein, salt (sodium), or sugar.  · Being overweight or obese.  What are the signs or symptoms?  Symptoms of a kidney stone may include:  · Nausea.  · Vomiting.  · Blood in the urine (hematuria).  · Pain in the side of the abdomen, right below the ribs (flank pain). Pain usually spreads (radiates) to the groin.  · Needing to urinate frequently or urgently.  How is this diagnosed?  This condition may be diagnosed based on:  · Your medical history.  · A physical exam.  · Blood tests.  · Urine tests.  · CT scan.  · Abdominal X-ray.  · A procedure to examine the inside of the bladder  (cystoscopy).  How is this treated?  Treatment for kidney stones depends on the size, location, and makeup of the stones. Treatment may involve:  · Analyzing your urine before and after you pass the stone through urination.  · Being monitored at the hospital until you pass the stone through urination.  · Increasing your fluid intake and decreasing the amount of calcium and protein in your diet.  · A procedure to break up kidney stones in the bladder using:  ? A focused beam of light (laser therapy).  ? Shock waves (extracorporeal shock wave lithotripsy).  · Surgery to remove kidney stones. This may be needed if you have severe pain or have stones that block your urinary tract.  Follow these instructions at home:  Eating and drinking  · Drink enough fluid to keep your urine clear or pale yellow. This will help you to pass the kidney stone.  · If directed, change your diet. This may include:  ? Limiting how much sodium you eat.  ? Eating more fruits and vegetables.  ? Limiting how much meat, poultry, fish, and eggs you eat.  · Follow instructions from your health care provider about eating or drinking restrictions.  General instructions  · Collect urine samples as told by your health care provider. You may need to collect a urine sample:  ? 24 hours after you pass the stone.  ? 8-12 weeks after passing the kidney stone, and every 6-12 months after that.  · Strain your urine every time you urinate, for as long as directed. Use the strainer that your health care provider recommends.  · Do not throw out the kidney stone after passing it. Keep the stone so it can be tested by your health care provider. Testing the makeup of your kidney stone may help prevent you from getting kidney stones in the future.  · Take over-the-counter and prescription medicines only as told by your health care provider.  · Keep all follow-up visits as told by your health care provider. This is important. You may need follow-up X-rays or  ultrasounds to make sure that your stone has passed.  How is this prevented?  To prevent another kidney stone:  · Drink enough fluid to keep your urine clear or pale yellow. This is the best way to prevent kidney stones.  · Eat a healthy diet and follow recommendations from your health care provider about foods to avoid. You may be instructed to eat a low-protein diet. Recommendations vary depending on the type of kidney stone that you have.  · Maintain a healthy weight.  Contact a health care provider if:  · You have pain that gets worse or does not get better with medicine.  Get help right away if:  · You have a fever or chills.  · You develop severe pain.  · You develop new abdominal pain.  · You faint.  · You are unable to urinate.  This information is not intended to replace advice given to you by your health care provider. Make sure you discuss any questions you have with your health care provider.  Document Released: 12/18/2006 Document Revised: 07/30/2019 Document Reviewed: 06/02/2017  Elsevier Patient Education © 2020 [employer]

## 2020-12-24 NOTE — PROGRESS NOTES
"Subjective:     Praveen Khalil is a 70 y.o. male who presents for UTI (x 3-4 days, hx of kidney stone ), Back Pain, and Eye Problem (feels like there is something on right eye)      UTI  This is a new problem. The current episode started in the past 7 days (4 days ago he started experiencing right sided kidney pain). The problem occurs intermittently. The problem has been waxing and waning. Pertinent negatives include no abdominal pain, chills, coughing, fever, headaches, myalgias, nausea, rash or vomiting. Associated symptoms comments: Urinary frequency however, he is on Bumex.  He denies dysuria or urinary urgency. . Nothing aggravates the symptoms. He has tried NSAIDs and acetaminophen (Azo which did provide relief of his symptoms. ) for the symptoms. The treatment provided mild (positive history of renal calculi) relief.   Back Pain  Pertinent negatives include no abdominal pain, dysuria, fever or headaches.   Eye Problem   The right eye is affected. This is a new problem. The current episode started more than 1 month ago. The problem occurs constantly. The problem has been unchanged. There was no injury mechanism (He states he feels there is something in his right eye). Pain scale: Irritation \"something scratching my eye everything I blink. There is no known exposure to pink eye. He does not wear contacts. Associated symptoms include blurred vision and a foreign body sensation. Pertinent negatives include no eye discharge, double vision, eye redness, fever, nausea, photophobia, recent URI or vomiting. He has tried nothing for the symptoms. The treatment provided mild relief.         Review of Systems   Constitutional: Negative for chills, fever and malaise/fatigue.   Eyes: Positive for blurred vision and pain. Negative for double vision, photophobia, discharge and redness.   Respiratory: Negative for cough.    Gastrointestinal: Negative for abdominal pain, diarrhea, nausea and vomiting.   Genitourinary: " "Positive for flank pain. Negative for dysuria, frequency, hematuria and urgency.   Musculoskeletal: Positive for back pain. Negative for myalgias.   Skin: Negative for rash.   Neurological: Negative for headaches.   All other systems reviewed and are negative.      PMH:   Past Medical History:   Diagnosis Date   • Anesthesia     \"Apnea at times with deep sedation\" and low oxygen level   • Aortic stenosis     TAVR 1/9/17. Follows with Dr Ureña with [facility] cardiology   • Arthritis     osteo- hands   • Atrial fibrillation (HCC)     A Fib. Resolved after TAVR.  Follows with Dr Taiwo Ureña with [facility] cardiology.   • Cataract 2015    bilat IOL    • Congestive heart failure (HCC)    • COPD (chronic obstructive pulmonary disease) ([facility])    • Disorder of thyroid     hypo   • Emphysema of lung ([facility])     COPD. States \"mild\". follow with Everett Stockton with [facility]. Oxygen 2.5L/NC with CPAP and during day prn.    • Hand pain 10/23/2020    due to arthritis, right worse than left   • High cholesterol    • History of right foot drop     after back surgery which was delayed due to DVT/PE. Wears boot.    • Hypertension    • Kidney stone     passed    • Pacemaker 02/2017    Had bout of V-tac and had pacemaker/AICD   • Psychiatric problem     depression, anxiety    • Pulmonary embolism ([facility]) 2014    with DVT to right leg. Had 5 clots to lungs. Blood thinners for approx 6 months   • Sleep apnea     BiPAP with oxygen 2.5 L   • Thrombocytopenia ([facility])     10/23/20-States PCP said platelets always a little low but not too concerned about it.      ALLERGIES:   Allergies   Allergen Reactions   • Nkda [No Known Drug Allergy]      SURGHX:   Past Surgical History:   Procedure Laterality Date   • PB COLONOSCOPY,DIAGNOSTIC  10/27/2020    Procedure: COLONOSCOPY;  Surgeon: Yassine Nicolas M.D.;  Location: SURGERY [facility];  Service: Gastroenterology   • COLONOSCOPY  07/28/2017   • PACEMAKER INSERTION  02/2017   • TRANSCATHETER " AORTIC VALVE REPLACEMENT  2017    Procedure: TRANSCATHETER AORTIC VALVE REPLACEMENT WITH SCOUT;  Surgeon: Sidney Laguna M.D.;  Location: SURGERY [facility];  Service:    • CATARACT EXTRACTION WITH IOL Bilateral    • DUPUYTREN CONTRACTURE RELEASE Right    • HIP ARTHROPLASTY TOTAL  10/22/2013    Performed by Nader Barr M.D. at SURGERY [facility]   • TRIGGER FINGER RELEASE Right    • LUMBAR LAMINECTOMY DISKECTOMY     • TONSILLECTOMY AND ADENOIDECTOMY  as child     SOCHX:   Social History     Socioeconomic History   • Marital status:      Spouse name: Not on file   • Number of children: Not on file   • Years of education: Not on file   • Highest education level: Not on file   Occupational History   • Not on file   Social Needs   • Financial resource strain: Not on file   • Food insecurity     Worry: Not on file     Inability: Not on file   • Transportation needs     Medical: Not on file     Non-medical: Not on file   Tobacco Use   • Smoking status: Former Smoker     Packs/day: 0.75     Years: 40.00     Pack years: 30.00     Types: Cigarettes, Pipe     Quit date: 3/1/2007     Years since quittin.8   • Smokeless tobacco: Never Used   • Tobacco comment: quit in 2007, continued abstinance   Substance and Sexual Activity   • Alcohol use: Yes     Frequency: Monthly or less     Drinks per session: 1 or 2     Binge frequency: Less than monthly     Comment: occ.   • Drug use: Not Currently     Comment: THC cream for arthritis   • Sexual activity: Not on file   Lifestyle   • Physical activity     Days per week: Not on file     Minutes per session: Not on file   • Stress: Not on file   Relationships   • Social connections     Talks on phone: Not on file     Gets together: Not on file     Attends Sabianist service: Not on file     Active member of club or organization: Not on file     Attends meetings of clubs or organizations: Not on file     Relationship status: Not on file   •  "Intimate partner violence     Fear of current or ex partner: Not on file     Emotionally abused: Not on file     Physically abused: Not on file     Forced sexual activity: Not on file   Other Topics Concern   • Not on file   Social History Narrative   • Not on file     FH:   Family History   Problem Relation Age of Onset   • No Known Problems Mother    • No Known Problems Father          Objective:   /94   Pulse 77   Temp 36.4 °C (97.5 °F) (Temporal)   Resp 18   Ht 1.905 m (6' 3\")   Wt (!) 149.7 kg (330 lb)   SpO2 94%   BMI 41.25 kg/m²     Physical Exam  Vitals signs and nursing note reviewed.   Constitutional:       General: He is not in acute distress.     Appearance: Normal appearance. He is not ill-appearing or toxic-appearing.   HENT:      Head: Normocephalic and atraumatic.      Right Ear: External ear normal.      Left Ear: External ear normal.      Nose: No congestion or rhinorrhea.      Mouth/Throat:      Mouth: Mucous membranes are moist.   Eyes:      General: Lids are normal. Lids are everted, no foreign bodies appreciated. Vision grossly intact. Gaze aligned appropriately.         Right eye: No foreign body, discharge or hordeolum.      Extraocular Movements: Extraocular movements intact.      Conjunctiva/sclera:      Right eye: Right conjunctiva is injected. Chemosis present. No exudate or hemorrhage.     Left eye: Left conjunctiva is injected. No chemosis, exudate or hemorrhage.     Pupils: Pupils are equal, round, and reactive to light.   Neck:      Musculoskeletal: Normal range of motion and neck supple.   Cardiovascular:      Rate and Rhythm: Normal rate and regular rhythm.      Pulses: Normal pulses.      Heart sounds: Normal heart sounds.   Pulmonary:      Effort: Pulmonary effort is normal.      Breath sounds: Normal breath sounds.   Abdominal:      General: Abdomen is flat. Bowel sounds are normal.      Palpations: Abdomen is soft.      Tenderness: There is no abdominal tenderness. " There is no right CVA tenderness or left CVA tenderness.   Musculoskeletal: Normal range of motion.      Lumbar back: He exhibits tenderness.        Back:    Skin:     General: Skin is warm and dry.      Capillary Refill: Capillary refill takes less than 2 seconds.   Neurological:      General: No focal deficit present.      Mental Status: He is alert and oriented to person, place, and time. Mental status is at baseline.   Psychiatric:         Mood and Affect: Mood normal.         Behavior: Behavior normal.         Thought Content: Thought content normal.         Judgment: Judgment normal.       POCT urine: protein 100, nitrates positive  Assessment/Plan:   Assessment    1. Acute right flank pain  CT-RENAL COLIC EVALUATION(A/P W/O)   2. Acute conjunctivitis of right eye, unspecified acute conjunctivitis type  tobramycin-dexamethasone (TOBRADEX) 0.3-0.1 % Suspension   3. Flank pain  URINE CULTURE(NEW)     Patient is experiencing severe symptoms at this time.  As it is [facility], imaging is now closed.  He was informed of this and would like to wait until Monday to schedule appointment.  He is in agreement to seek treatment in ER if his symptoms worsen as this could be a renal calculi.  His urine will be sent for culture however, as there is no blood or leukocytes I do not believe that he is suffering from urinary tract infection or pyelonephritis.  As his eye pain has been persistent for the past month, he will be started on TobraDex.  He was encouraged to follow-up with ophthalmologist if his symptoms do not improve.  Other differentials of eye pain include dry eye, use of new medication and foreign body.  AVS handout given and reviewed with patient. Pt educated on red flags and when to seek treatment back in ER or UC.

## 2020-12-27 ENCOUNTER — TELEPHONE (OUTPATIENT)
Dept: URGENT CARE | Facility: CLINIC | Age: 70
End: 2020-12-27

## 2020-12-27 LAB
BACTERIA UR CULT: NORMAL
SIGNIFICANT IND 70042: NORMAL
SITE SITE: NORMAL
SOURCE SOURCE: NORMAL

## 2021-01-15 ENCOUNTER — APPOINTMENT (RX ONLY)
Dept: URBAN - METROPOLITAN AREA CLINIC 4 | Facility: CLINIC | Age: 71
Setting detail: DERMATOLOGY
End: 2021-01-15

## 2021-01-15 DIAGNOSIS — L663 OTHER SPECIFIED DISEASES OF HAIR AND HAIR FOLLICLES: ICD-10-CM

## 2021-01-15 DIAGNOSIS — D22 MELANOCYTIC NEVI: ICD-10-CM

## 2021-01-15 DIAGNOSIS — L85.8 OTHER SPECIFIED EPIDERMAL THICKENING: ICD-10-CM

## 2021-01-15 DIAGNOSIS — L738 OTHER SPECIFIED DISEASES OF HAIR AND HAIR FOLLICLES: ICD-10-CM

## 2021-01-15 DIAGNOSIS — L82.1 OTHER SEBORRHEIC KERATOSIS: ICD-10-CM

## 2021-01-15 DIAGNOSIS — L81.4 OTHER MELANIN HYPERPIGMENTATION: ICD-10-CM

## 2021-01-15 DIAGNOSIS — L73.9 FOLLICULAR DISORDER, UNSPECIFIED: ICD-10-CM

## 2021-01-15 DIAGNOSIS — L85.3 XEROSIS CUTIS: ICD-10-CM

## 2021-01-15 DIAGNOSIS — L89 PRESSURE ULCER: ICD-10-CM

## 2021-01-15 DIAGNOSIS — Z23 NEED FOR VACCINATION: ICD-10-CM

## 2021-01-15 DIAGNOSIS — L57.0 ACTINIC KERATOSIS: ICD-10-CM

## 2021-01-15 DIAGNOSIS — D18.0 HEMANGIOMA: ICD-10-CM

## 2021-01-15 PROBLEM — L02.426 FURUNCLE OF LEFT LOWER LIMB: Status: ACTIVE | Noted: 2021-01-15

## 2021-01-15 PROBLEM — D22.71 MELANOCYTIC NEVI OF RIGHT LOWER LIMB, INCLUDING HIP: Status: ACTIVE | Noted: 2021-01-15

## 2021-01-15 PROBLEM — L89.96 PRESSURE-INDUCED DEEP TISSUE DAMAGE OF UNSPECIFIED SITE: Status: ACTIVE | Noted: 2021-01-15

## 2021-01-15 PROBLEM — D22.72 MELANOCYTIC NEVI OF LEFT LOWER LIMB, INCLUDING HIP: Status: ACTIVE | Noted: 2021-01-15

## 2021-01-15 PROBLEM — D22.5 MELANOCYTIC NEVI OF TRUNK: Status: ACTIVE | Noted: 2021-01-15

## 2021-01-15 PROBLEM — D48.5 NEOPLASM OF UNCERTAIN BEHAVIOR OF SKIN: Status: ACTIVE | Noted: 2021-01-15

## 2021-01-15 PROBLEM — D18.01 HEMANGIOMA OF SKIN AND SUBCUTANEOUS TISSUE: Status: ACTIVE | Noted: 2021-01-15

## 2021-01-15 PROBLEM — D22.61 MELANOCYTIC NEVI OF RIGHT UPPER LIMB, INCLUDING SHOULDER: Status: ACTIVE | Noted: 2021-01-15

## 2021-01-15 PROBLEM — L02.425 FURUNCLE OF RIGHT LOWER LIMB: Status: ACTIVE | Noted: 2021-01-15

## 2021-01-15 PROBLEM — D22.62 MELANOCYTIC NEVI OF LEFT UPPER LIMB, INCLUDING SHOULDER: Status: ACTIVE | Noted: 2021-01-15

## 2021-01-15 PROCEDURE — ? BIOPSY BY SHAVE METHOD

## 2021-01-15 PROCEDURE — 17003 DESTRUCT PREMALG LES 2-14: CPT

## 2021-01-15 PROCEDURE — ? COUNSELING

## 2021-01-15 PROCEDURE — 11102 TANGNTL BX SKIN SINGLE LES: CPT

## 2021-01-15 PROCEDURE — 17000 DESTRUCT PREMALG LESION: CPT | Mod: 59

## 2021-01-15 PROCEDURE — 99213 OFFICE O/P EST LOW 20 MIN: CPT | Mod: 25

## 2021-01-15 PROCEDURE — ? LIQUID NITROGEN

## 2021-01-15 PROCEDURE — ? ADDITIONAL NOTES

## 2021-01-15 PROCEDURE — ? PRESCRIPTION

## 2021-01-15 PROCEDURE — ? PATIENT SPECIFIC COUNSELING

## 2021-01-15 RX ORDER — MUPIROCIN 20 MG/G
OINTMENT TOPICAL
Qty: 1 | Refills: 3 | Status: ERX | COMMUNITY
Start: 2021-01-15

## 2021-01-15 RX ADMIN — MUPIROCIN: 20 OINTMENT TOPICAL at 00:00

## 2021-01-15 ASSESSMENT — LOCATION SIMPLE DESCRIPTION DERM
LOCATION SIMPLE: LEFT UPPER ARM
LOCATION SIMPLE: LEFT FOREHEAD
LOCATION SIMPLE: RIGHT EAR
LOCATION SIMPLE: RIGHT LOWER BACK
LOCATION SIMPLE: RIGHT FOREHEAD
LOCATION SIMPLE: RIGHT HAND
LOCATION SIMPLE: LEFT CALF
LOCATION SIMPLE: LEFT LOWER BACK
LOCATION SIMPLE: ABDOMEN
LOCATION SIMPLE: LEFT CHEEK
LOCATION SIMPLE: LEFT FOREARM
LOCATION SIMPLE: RIGHT POPLITEAL SKIN
LOCATION SIMPLE: RIGHT CALF
LOCATION SIMPLE: CHEST
LOCATION SIMPLE: LEFT HAND
LOCATION SIMPLE: LEFT ELBOW
LOCATION SIMPLE: LEFT POPLITEAL SKIN
LOCATION SIMPLE: RIGHT FOREARM
LOCATION SIMPLE: RIGHT POSTERIOR THIGH
LOCATION SIMPLE: LEFT FOREARM
LOCATION SIMPLE: LEFT EAR
LOCATION SIMPLE: RIGHT UPPER ARM
LOCATION SIMPLE: LEFT POSTERIOR THIGH
LOCATION SIMPLE: SCALP
LOCATION SIMPLE: RIGHT UPPER BACK

## 2021-01-15 ASSESSMENT — LOCATION ZONE DERM
LOCATION ZONE: HAND
LOCATION ZONE: LEG
LOCATION ZONE: TRUNK
LOCATION ZONE: ARM
LOCATION ZONE: SCALP
LOCATION ZONE: ARM
LOCATION ZONE: EAR
LOCATION ZONE: FACE

## 2021-01-15 ASSESSMENT — LOCATION DETAILED DESCRIPTION DERM
LOCATION DETAILED: LEFT INFERIOR CENTRAL MALAR CHEEK
LOCATION DETAILED: LEFT MEDIAL FOREHEAD
LOCATION DETAILED: RIGHT INFERIOR MEDIAL MIDBACK
LOCATION DETAILED: RIGHT POPLITEAL SKIN
LOCATION DETAILED: RIGHT VENTRAL DISTAL FOREARM
LOCATION DETAILED: RIGHT ANTERIOR PROXIMAL UPPER ARM
LOCATION DETAILED: RIGHT INFERIOR LATERAL FOREHEAD
LOCATION DETAILED: RIGHT SUPERIOR HELIX
LOCATION DETAILED: RIGHT RADIAL DORSAL HAND
LOCATION DETAILED: LEFT RADIAL DORSAL HAND
LOCATION DETAILED: RIGHT PROXIMAL CALF
LOCATION DETAILED: RIGHT MEDIAL FOREHEAD
LOCATION DETAILED: EPIGASTRIC SKIN
LOCATION DETAILED: RIGHT MEDIAL UPPER BACK
LOCATION DETAILED: LEFT SUPERIOR MEDIAL MIDBACK
LOCATION DETAILED: RIGHT INFERIOR UPPER BACK
LOCATION DETAILED: XIPHOID
LOCATION DETAILED: LEFT PROXIMAL POSTERIOR THIGH
LOCATION DETAILED: LEFT VENTRAL DISTAL FOREARM
LOCATION DETAILED: RIGHT MEDIAL INFERIOR CHEST
LOCATION DETAILED: RIGHT PROXIMAL POSTERIOR THIGH
LOCATION DETAILED: LEFT SCAPHA
LOCATION DETAILED: RIGHT SUPERIOR FOREHEAD
LOCATION DETAILED: LEFT INFERIOR FOREHEAD
LOCATION DETAILED: RIGHT DISTAL POSTERIOR THIGH
LOCATION DETAILED: RIGHT SUPERIOR PARIETAL SCALP
LOCATION DETAILED: LEFT VENTRAL DISTAL FOREARM
LOCATION DETAILED: RIGHT VENTRAL PROXIMAL FOREARM
LOCATION DETAILED: LEFT PROXIMAL CALF
LOCATION DETAILED: LEFT ANTECUBITAL SKIN
LOCATION DETAILED: LEFT DISTAL POSTERIOR THIGH
LOCATION DETAILED: LEFT ANTERIOR DISTAL UPPER ARM
LOCATION DETAILED: RIGHT ANTERIOR DISTAL UPPER ARM
LOCATION DETAILED: PERIUMBILICAL SKIN
LOCATION DETAILED: LEFT POPLITEAL SKIN

## 2021-01-15 NOTE — HPI: SECONDARY COMPLAINT
How Severe Is This Condition?: moderate
Additional History: Last week, patient noticed after using the bathroom a bloody stool and a possible ingrown hair. Patient’s GI doctor is Dr. Prather and his last colonoscopy was a couple months ago.

## 2021-01-15 NOTE — PROCEDURE: PATIENT SPECIFIC COUNSELING
Discussed that pt needs to stop sitting with so much pressure on the upper post. thighs.
Detail Level: Detailed

## 2021-01-15 NOTE — PROCEDURE: BIOPSY BY SHAVE METHOD
Detail Level: Detailed
Depth Of Biopsy: dermis
Was A Bandage Applied: Yes
Size Of Lesion In Cm: 0.2
X Size Of Lesion In Cm: 0
Biopsy Type: H and E
Biopsy Method: Personna blade
Anesthesia Type: 1% lidocaine with epinephrine
Anesthesia Volume In Cc: 1
Hemostasis: Electrocautery
Wound Care: Vaseline
Dressing: Band-Aid
Destruction After The Procedure: No
Type Of Destruction Used: Electrodesiccation
Curettage Text: The wound bed was treated with curettage after the biopsy was performed.
Cryotherapy Text: The wound bed was treated with cryotherapy after the biopsy was performed.
Electrodesiccation Text: The wound bed was treated with electrodesiccation after the biopsy was performed.
Electrodesiccation And Curettage Text: The wound bed was treated with electrodesiccation and curettage after the biopsy was performed.
Silver Nitrate Text: The wound bed was treated with silver nitrate after the biopsy was performed.
Lab: [id number]
Lab Facility: 
Consent: Written consent was obtained and risks were reviewed including but not limited to scarring, infection, bleeding, scabbing, incomplete removal, nerve damage and allergy to anesthesia.
Post-Care Instructions: I reviewed with the patient in detail post-care instructions. Patient is to keep the biopsy site dry overnight, and then apply bacitracin/petroleum  twice daily until healed. Patient may apply hydrogen peroxide soaks to remove any crusting.
Notification Instructions: Patient will be notified of biopsy results. However, patient instructed to call the office if not contacted within 2 weeks.
Billing Type: Third-Party Bill
Information: Selecting Yes will display possible errors in your note based on the variables you have selected. This validation is only offered as a suggestion for you. PLEASE NOTE THAT THE VALIDATION TEXT WILL BE REMOVED WHEN YOU FINALIZE YOUR NOTE. IF YOU WANT TO FAX A PRELIMINARY NOTE YOU WILL NEED TO TOGGLE THIS TO 'NO' IF YOU DO NOT WANT IT IN YOUR FAXED NOTE.

## 2021-02-13 ENCOUNTER — OFFICE VISIT (OUTPATIENT)
Dept: URGENT CARE | Facility: CLINIC | Age: 71
End: 2021-02-13
Payer: MEDICARE

## 2021-02-13 VITALS
WEIGHT: 315 LBS | SYSTOLIC BLOOD PRESSURE: 122 MMHG | OXYGEN SATURATION: 95 % | DIASTOLIC BLOOD PRESSURE: 60 MMHG | HEART RATE: 69 BPM | HEIGHT: 75 IN | BODY MASS INDEX: 39.17 KG/M2 | TEMPERATURE: 97.3 F

## 2021-02-13 DIAGNOSIS — S91.209A AVULSION OF TOENAIL, INITIAL ENCOUNTER: ICD-10-CM

## 2021-02-13 PROCEDURE — 99213 OFFICE O/P EST LOW 20 MIN: CPT | Performed by: PHYSICIAN ASSISTANT

## 2021-02-13 ASSESSMENT — ENCOUNTER SYMPTOMS
VOMITING: 0
FEVER: 0
NAUSEA: 0
EYE DISCHARGE: 0
HEADACHES: 0
EYE REDNESS: 0
COUGH: 0

## 2021-02-13 ASSESSMENT — FIBROSIS 4 INDEX: FIB4 SCORE: 3.65

## 2021-02-13 NOTE — PROGRESS NOTES
"Subjective:      Praveen Khalil is a 70 y.o. male who presents with Nail Problem (big toe right foot )        This is a new problem.   The patient presents to clinic complaining of a problem to his right great toenail.  The patient states his right great toenail has been discolored for quite some time, which he believes to be related to a fungal infection.  The patient states over time his right great toenail has become thick and hard.  The patient states yesterday he noticed a small amount of blood to the nail fold of the right great toe.  The patient reports no injury or trauma to the right great toe.  The patient states he did not stub his right great toe.  The patient states it appears his right great toenail has become partially \"detached\" from the nail bed.  The patient states he is able to lift up a portion of the right great toenail.  The patient reports no pain to the right great toe.  No swelling.  No bruising.  No overlying redness.  No increased warmth.  The patient also reports no associated fever.  The patient has applied Neosporin and a Band-Aid over the right great toenail.  He has not taken any OTC medications for his current symptoms.    Nail Problem  This is a new problem. Episode onset: x 2 days ago. The problem occurs constantly. The problem has been unchanged. Pertinent negatives include no congestion, coughing, fever, headaches, nausea, rash or vomiting. Nothing aggravates the symptoms. He has tried nothing for the symptoms.     The patient reports a history of chronic venous insufficiency of the right lower extremity.  The patient states he is followed by [facility].  The patient states he was most recently seen at [facility] last week.  The patient states he is scheduled to follow-up with a vascular specialist to assess for possible peripheral artery disease.    The patient states he does not have a podiatrist    PMH:  has a past medical history of Anesthesia, Aortic stenosis, " Arthritis, Atrial fibrillation (HCC), Cataract (2015), Congestive heart failure (HCC), COPD (chronic obstructive pulmonary disease) (HCC), Disorder of thyroid, Emphysema of lung (HCC), Hand pain (10/23/2020), High cholesterol, History of right foot drop, Hypertension, Kidney stone, Pacemaker (02/2017), Psychiatric problem, Pulmonary embolism (HCC) (2014), Sleep apnea, and Thrombocytopenia (HCC).  MEDS:   Current Outpatient Medications:   •  tobramycin-dexamethasone (TOBRADEX) 0.3-0.1 % Suspension, Administer 1 Drop into both eyes every 4 hours while awake., Disp: 10 mL, Rfl: 0  •  amiodarone (CORDARONE) 200 MG Tab, Take 200 mg by mouth every morning., Disp: , Rfl:   •  metoprolol SR (TOPROL XL) 100 MG TABLET SR 24 HR, Take 200 mg by mouth every bedtime., Disp: , Rfl:   •  levalbuterol (XOPENEX HFA) 45 MCG/ACT inhaler, Inhale 1-2 Puffs by mouth every 8 hours as needed for Shortness of Breath (wheezing)., Disp: 1 Inhaler, Rfl: 0  •  levothyroxine (SYNTHROID) 75 MCG Tab, Take 75 mcg by mouth Every morning on an empty stomach., Disp: , Rfl:   •  rosuvastatin (CRESTOR) 10 MG Tab, Take 10 mg by mouth every day., Disp: , Rfl:   •  aspirin (ASA) 81 MG Chew Tab chewable tablet, Take 81 mg by mouth every day., Disp: , Rfl:   •  potassium chloride SA (KDUR) 20 MEQ Tab CR, Take 1 Tab by mouth 2 times a day., Disp: 180 Tab, Rfl: 3  •  calcium carbonate-vitamin D (OSCAL 500/200 D-3) 500-200 MG-UNIT Tab, Take 2 Tabs by mouth every morning with breakfast., Disp: , Rfl:   •  Cholecalciferol (VITAMIN D) 2000 UNIT Tab, Take 2,000 Units by mouth every morning., Disp: , Rfl:   •  Coenzyme Q10 (CO Q-10 MAXIMUM STRENGTH) 400 MG Cap, Take 1 Cap by mouth every morning., Disp: , Rfl:   •  bumetanide (BUMEX) 1 MG Tab, Take 1 mg by mouth 2 times a day., Disp: , Rfl:   •  escitalopram (LEXAPRO) 10 MG Tab, Take 10 mg by mouth every day., Disp: , Rfl:   •  aliskiren (TEKTURNA) 300 MG tablet, Take 300 mg by mouth every bedtime., Disp: , Rfl:   •   "allopurinol (ZYLOPRIM) 300 MG TABS, Take 300 mg by mouth every morning. Indications: Uric Acid Kidney Stones, Disp: , Rfl:   •  Multiple Vitamins-Minerals (MULTIVITAL PO), Take 1 Tab by mouth every day., Disp: , Rfl:   ALLERGIES:   Allergies   Allergen Reactions   • Nkda [No Known Drug Allergy]      SURGHX:   Past Surgical History:   Procedure Laterality Date   • PB COLONOSCOPY,DIAGNOSTIC  10/27/2020    Procedure: COLONOSCOPY;  Surgeon: Yassine Nicolas M.D.;  Location: SURGERY [facility];  Service: Gastroenterology   • COLONOSCOPY  07/28/2017   • PACEMAKER INSERTION  02/2017   • TRANSCATHETER AORTIC VALVE REPLACEMENT  1/9/2017    Procedure: TRANSCATHETER AORTIC VALVE REPLACEMENT WITH SCOUT;  Surgeon: Sidney Laguna M.D.;  Location: SURGERY [facility];  Service:    • CATARACT EXTRACTION WITH IOL Bilateral 2015   • DUPUYTREN CONTRACTURE RELEASE Right 2014   • HIP ARTHROPLASTY TOTAL  10/22/2013    Performed by Nader Barr M.D. at SURGERY [facility]   • TRIGGER FINGER RELEASE Right 2013   • LUMBAR LAMINECTOMY DISKECTOMY  2007   • TONSILLECTOMY AND ADENOIDECTOMY  as child     SOCHX:  reports that he quit smoking about 13 years ago. His smoking use included cigarettes and pipe. He has a 30.00 pack-year smoking history. He has never used smokeless tobacco. He reports current alcohol use. He reports previous drug use.  FH: Family history was reviewed, no pertinent findings to report      Review of Systems   Constitutional: Negative for fever.   HENT: Negative for congestion.    Eyes: Negative for discharge and redness.   Respiratory: Negative for cough.    Cardiovascular: Negative for leg swelling.   Gastrointestinal: Negative for nausea and vomiting.   Skin: Negative for rash.   Neurological: Negative for headaches.          Objective:     /60 (BP Location: Left arm, Patient Position: Sitting, BP Cuff Size: Adult)   Pulse 69   Temp 36.3 °C (97.3 °F) (Temporal)   Ht 1.905 m (6' 3\")   Wt (!) 159 kg " (350 lb)   SpO2 95%   BMI 43.75 kg/m²      Physical Exam  Constitutional:       General: He is not in acute distress.     Appearance: Normal appearance. He is well-developed. He is not ill-appearing.   HENT:      Head: Normocephalic and atraumatic.      Right Ear: External ear normal.      Left Ear: External ear normal.      Nose: Nose normal.   Eyes:      Extraocular Movements: Extraocular movements intact.      Conjunctiva/sclera: Conjunctivae normal.   Cardiovascular:      Rate and Rhythm: Normal rate.   Pulmonary:      Effort: Pulmonary effort is normal.   Musculoskeletal:      Cervical back: Normal range of motion and neck supple.      Comments:   Right Great Toe:   Partial avulsion of the right great toenail.   The patient's right great toe nail is hypertrophic with yellow-discoloration, consistent with onychomycosis.  No tenderness to palpation surrounding the right great toenail. No swelling. No surrounding erythema. No increased warmth. No discharge/drainge from the nail fold. No active bleeding. No signs of paronychia.  The patient has chronic venous stasis changes to the right foot with diffuse swelling.   Skin:     General: Skin is warm and dry.   Neurological:      Mental Status: He is alert and oriented to person, place, and time.                 Assessment/Plan:         1. Avulsion of toenail, initial encounter  - REFERRAL TO PODIATRY    The patient's presenting symptoms and physical exam findings are consistent with avulsion of the toenail of the right great toe.  On physical exam, the patient had a partial avulsion of the right great toenail.  The patient's right great toenail is hypertrophic with yellow discoloration, consistent with onychomycosis.  The patient had no tenderness to palpation surrounding the right great toenail.  He also had no associated swelling, surrounding erythema, increased warmth, discharge/drainage from the nail fold, or active bleeding.  No signs of paronychia were  appreciated.  The patient had chronic venous stasis changes to the right foot with diffuse swelling.  Advised the patient that I was unable to remove his right great toenail today in clinic.  Will place an urgent referral to podiatry for further evaluation and management.  Advised the patient to follow-up with the vascular specialist as scheduled regarding possible peripheral arterial disease.  Recommend OTC medications and supportive care for symptomatic management.  Recommend patient follow-up with PCP as needed.  Discussed STRICT ED precautions with the patient, and he verbalized understanding.    Differential diagnoses, supportive care, and indications for immediate follow-up discussed with patient.   Instructed to return to clinic or nearest emergency department for any change in condition, further concerns, or worsening of symptoms.    OTC Tylenol or Motrin for fever/discomfort.  [place] [employer]   Monitor for signs of infection   Referral to podiatry  Follow-up PCP as needed  Return to clinic or go to the ED if symptoms worsen or fail to improve, or if the patient should develop worsening/increasing/persistent of the right great toenail, pain/tenderness the affected area, swelling, bruising, overlying redness or increased warmth, discharge/drainage, pain/tenderness to the right foot, swelling, difficulty walking, fever/chills, and/or any concerning symptoms.    Discussed plan with the patient, and he agrees to the above.    I personally reviewed prior external notes and test results pertinent to today's visit.  I have independently reviewed and interpreted all diagnostics ordered during this urgent care visit.     Time spent evaluating this patient was at least 30 minutes and includes preparing for visit, obtaining history, exam and evaluation, ordering labs/tests/procedures/medications, independent interpretation, and counseling/education.    Please note that this dictation was created using voice  recognition software. I have made every reasonable attempt to correct obvious errors, but I expect that there may be errors of grammar and possibly content that I did not discover before finalizing the note.     This note was electronically signed by Shital Vickers PA-C

## 2021-02-17 ENCOUNTER — IMMUNIZATION (OUTPATIENT)
Dept: FAMILY PLANNING/WOMEN'S HEALTH CLINIC | Facility: IMMUNIZATION CENTER | Age: 71
End: 2021-02-17
Attending: INTERNAL MEDICINE
Payer: MEDICARE

## 2021-02-17 DIAGNOSIS — Z23 NEED FOR VACCINATION: ICD-10-CM

## 2021-02-17 DIAGNOSIS — Z23 ENCOUNTER FOR VACCINATION: Primary | ICD-10-CM

## 2021-02-17 PROCEDURE — 91300 PFIZER SARS-COV-2 VACCINE: CPT

## 2021-02-17 PROCEDURE — 0001A PFIZER SARS-COV-2 VACCINE: CPT

## 2021-03-04 ENCOUNTER — HOSPITAL ENCOUNTER (OUTPATIENT)
Dept: LAB | Facility: MEDICAL CENTER | Age: 71
End: 2021-03-04
Attending: RADIOLOGY
Payer: MEDICARE

## 2021-03-04 LAB
ALBUMIN SERPL BCP-MCNC: 4.1 G/DL (ref 3.2–4.9)
ALBUMIN/GLOB SERPL: 1.3 G/DL
ALP SERPL-CCNC: 64 U/L (ref 30–99)
ALT SERPL-CCNC: 26 U/L (ref 2–50)
ANION GAP SERPL CALC-SCNC: 9 MMOL/L (ref 7–16)
AST SERPL-CCNC: 28 U/L (ref 12–45)
BASOPHILS # BLD AUTO: 0.6 % (ref 0–1.8)
BASOPHILS # BLD: 0.05 K/UL (ref 0–0.12)
BILIRUB SERPL-MCNC: 1.1 MG/DL (ref 0.1–1.5)
BUN SERPL-MCNC: 22 MG/DL (ref 8–22)
CALCIUM SERPL-MCNC: 9.8 MG/DL (ref 8.5–10.5)
CHLORIDE SERPL-SCNC: 105 MMOL/L (ref 96–112)
CO2 SERPL-SCNC: 31 MMOL/L (ref 20–33)
CREAT SERPL-MCNC: 1.16 MG/DL (ref 0.5–1.4)
EOSINOPHIL # BLD AUTO: 0.27 K/UL (ref 0–0.51)
EOSINOPHIL NFR BLD: 3.3 % (ref 0–6.9)
ERYTHROCYTE [DISTWIDTH] IN BLOOD BY AUTOMATED COUNT: 55.8 FL (ref 35.9–50)
GLOBULIN SER CALC-MCNC: 3.2 G/DL (ref 1.9–3.5)
GLUCOSE SERPL-MCNC: 81 MG/DL (ref 65–99)
HCT VFR BLD AUTO: 44.4 % (ref 42–52)
HGB BLD-MCNC: 14.6 G/DL (ref 14–18)
IMM GRANULOCYTES # BLD AUTO: 0.02 K/UL (ref 0–0.11)
IMM GRANULOCYTES NFR BLD AUTO: 0.2 % (ref 0–0.9)
INR PPP: 1.05 (ref 0.87–1.13)
LYMPHOCYTES # BLD AUTO: 1.11 K/UL (ref 1–4.8)
LYMPHOCYTES NFR BLD: 13.7 % (ref 22–41)
MCH RBC QN AUTO: 31.4 PG (ref 27–33)
MCHC RBC AUTO-ENTMCNC: 32.9 G/DL (ref 33.7–35.3)
MCV RBC AUTO: 95.5 FL (ref 81.4–97.8)
MONOCYTES # BLD AUTO: 0.76 K/UL (ref 0–0.85)
MONOCYTES NFR BLD AUTO: 9.4 % (ref 0–13.4)
NEUTROPHILS # BLD AUTO: 5.9 K/UL (ref 1.82–7.42)
NEUTROPHILS NFR BLD: 72.8 % (ref 44–72)
NRBC # BLD AUTO: 0 K/UL
NRBC BLD-RTO: 0 /100 WBC
PLATELET # BLD AUTO: 106 K/UL (ref 164–446)
PMV BLD AUTO: 11.2 FL (ref 9–12.9)
POTASSIUM SERPL-SCNC: 4 MMOL/L (ref 3.6–5.5)
PROT SERPL-MCNC: 7.3 G/DL (ref 6–8.2)
PROTHROMBIN TIME: 14 SEC (ref 12–14.6)
RBC # BLD AUTO: 4.65 M/UL (ref 4.7–6.1)
SODIUM SERPL-SCNC: 145 MMOL/L (ref 135–145)
WBC # BLD AUTO: 8.1 K/UL (ref 4.8–10.8)

## 2021-03-04 PROCEDURE — 85610 PROTHROMBIN TIME: CPT

## 2021-03-04 PROCEDURE — 80053 COMPREHEN METABOLIC PANEL: CPT

## 2021-03-04 PROCEDURE — 36415 COLL VENOUS BLD VENIPUNCTURE: CPT

## 2021-03-04 PROCEDURE — 85025 COMPLETE CBC W/AUTO DIFF WBC: CPT

## 2021-03-10 ENCOUNTER — IMMUNIZATION (OUTPATIENT)
Dept: FAMILY PLANNING/WOMEN'S HEALTH CLINIC | Facility: IMMUNIZATION CENTER | Age: 71
End: 2021-03-10
Attending: INTERNAL MEDICINE
Payer: MEDICARE

## 2021-03-10 DIAGNOSIS — Z23 ENCOUNTER FOR VACCINATION: Primary | ICD-10-CM

## 2021-03-10 PROCEDURE — 0002A PFIZER SARS-COV-2 VACCINE: CPT

## 2021-03-10 PROCEDURE — 91300 PFIZER SARS-COV-2 VACCINE: CPT

## 2021-04-08 ENCOUNTER — APPOINTMENT (RX ONLY)
Dept: URBAN - METROPOLITAN AREA CLINIC 4 | Facility: CLINIC | Age: 71
Setting detail: DERMATOLOGY
End: 2021-04-08

## 2021-04-08 DIAGNOSIS — Z87.2 PERSONAL HISTORY OF DISEASES OF THE SKIN AND SUBCUTANEOUS TISSUE: ICD-10-CM

## 2021-04-08 DIAGNOSIS — L57.0 ACTINIC KERATOSIS: ICD-10-CM

## 2021-04-08 DIAGNOSIS — L82.1 OTHER SEBORRHEIC KERATOSIS: ICD-10-CM

## 2021-04-08 DIAGNOSIS — L30.4 ERYTHEMA INTERTRIGO: ICD-10-CM

## 2021-04-08 PROCEDURE — 17000 DESTRUCT PREMALG LESION: CPT

## 2021-04-08 PROCEDURE — 17003 DESTRUCT PREMALG LES 2-14: CPT

## 2021-04-08 PROCEDURE — ? LIQUID NITROGEN

## 2021-04-08 PROCEDURE — ? OBSERVATION

## 2021-04-08 PROCEDURE — ? COUNSELING

## 2021-04-08 PROCEDURE — 99213 OFFICE O/P EST LOW 20 MIN: CPT | Mod: 25

## 2021-04-08 PROCEDURE — ? PRESCRIPTION

## 2021-04-08 RX ORDER — CICLOPIROX 7.7 MG/G
1 GEL TOPICAL BID
Qty: 1 | Refills: 12 | Status: ERX | COMMUNITY
Start: 2021-04-08

## 2021-04-08 RX ADMIN — CICLOPIROX 1: 7.7 GEL TOPICAL at 00:00

## 2021-04-08 ASSESSMENT — LOCATION ZONE DERM
LOCATION ZONE: FACE
LOCATION ZONE: TRUNK
LOCATION ZONE: ARM

## 2021-04-08 ASSESSMENT — LOCATION SIMPLE DESCRIPTION DERM
LOCATION SIMPLE: ABDOMEN
LOCATION SIMPLE: CHEST
LOCATION SIMPLE: RIGHT CHEEK
LOCATION SIMPLE: LEFT TEMPLE
LOCATION SIMPLE: RIGHT UPPER ARM
LOCATION SIMPLE: LEFT FOREHEAD

## 2021-04-08 ASSESSMENT — LOCATION DETAILED DESCRIPTION DERM
LOCATION DETAILED: LEFT INFERIOR TEMPLE
LOCATION DETAILED: RIGHT INFERIOR CENTRAL MALAR CHEEK
LOCATION DETAILED: PERIUMBILICAL SKIN
LOCATION DETAILED: LEFT LATERAL INFERIOR CHEST
LOCATION DETAILED: LEFT INFERIOR LATERAL FOREHEAD
LOCATION DETAILED: RIGHT ANTERIOR DISTAL UPPER ARM
LOCATION DETAILED: RIGHT PROXIMAL POSTERIOR UPPER ARM

## 2021-04-08 NOTE — PROCEDURE: LIQUID NITROGEN
Include Z78.9 (Other Specified Conditions Influencing Health Status) As An Associated Diagnosis?: No
Post-Care Instructions: I reviewed with the patient in detail post-care instructions. Patient is to wear sunprotection, and avoid picking at any of the treated lesions. Pt may apply Vaseline to crusted or scabbing areas.
Consent: The patient's consent was obtained including but not limited to risks of crusting, scabbing, blistering, scarring, darker or lighter pigmentary change, recurrence, incomplete removal and infection.
Detail Level: Detailed
Medical Necessity Information: It is in your best interest to select a reason for this procedure from the list below. All of these items fulfill various CMS LCD requirements except the new and changing color options.
Medical Necessity Clause: This procedure was medically necessary because the lesions that were treated were:  If lesion does not resolve, bx is needed.
Duration Of Freeze Thaw-Cycle (Seconds): 0

## 2021-04-08 NOTE — PROCEDURE: COUNSELING
Detail Level: Detailed
Detail Level: Zone
Patient Specific Counseling (Will Not Stick From Patient To Patient): Recommended head and shoulders.

## 2021-04-13 RX ORDER — CICLOPIROX 7.7 MG/G
GEL TOPICAL BID
Qty: 1 | Refills: 12 | Status: ERX

## 2021-04-21 ENCOUNTER — HOSPITAL ENCOUNTER (OUTPATIENT)
Dept: LAB | Facility: MEDICAL CENTER | Age: 71
End: 2021-04-21
Attending: PSYCHIATRY & NEUROLOGY
Payer: MEDICARE

## 2021-04-21 PROCEDURE — 82175 ASSAY OF ARSENIC: CPT

## 2021-04-21 PROCEDURE — 82390 ASSAY OF CERULOPLASMIN: CPT

## 2021-04-21 PROCEDURE — 82300 ASSAY OF CADMIUM: CPT

## 2021-04-21 PROCEDURE — 83655 ASSAY OF LEAD: CPT

## 2021-04-21 PROCEDURE — 85652 RBC SED RATE AUTOMATED: CPT

## 2021-04-21 PROCEDURE — 84155 ASSAY OF PROTEIN SERUM: CPT

## 2021-04-21 PROCEDURE — 86038 ANTINUCLEAR ANTIBODIES: CPT

## 2021-04-21 PROCEDURE — 83825 ASSAY OF MERCURY: CPT

## 2021-04-21 PROCEDURE — 84165 PROTEIN E-PHORESIS SERUM: CPT

## 2021-04-21 PROCEDURE — 82607 VITAMIN B-12: CPT

## 2021-04-21 PROCEDURE — 36415 COLL VENOUS BLD VENIPUNCTURE: CPT

## 2021-04-21 PROCEDURE — 82525 ASSAY OF COPPER: CPT

## 2021-04-21 PROCEDURE — 82746 ASSAY OF FOLIC ACID SERUM: CPT

## 2021-04-22 ENCOUNTER — HOSPITAL ENCOUNTER (OUTPATIENT)
Dept: LAB | Facility: MEDICAL CENTER | Age: 71
End: 2021-04-22
Attending: PHYSICIAN ASSISTANT
Payer: MEDICARE

## 2021-04-22 LAB
ALBUMIN SERPL BCP-MCNC: 4 G/DL (ref 3.2–4.9)
ALBUMIN/GLOB SERPL: 1.3 G/DL
ALP SERPL-CCNC: 62 U/L (ref 30–99)
ALT SERPL-CCNC: 22 U/L (ref 2–50)
ANION GAP SERPL CALC-SCNC: 10 MMOL/L (ref 7–16)
AST SERPL-CCNC: 22 U/L (ref 12–45)
BASOPHILS # BLD AUTO: 0.6 % (ref 0–1.8)
BASOPHILS # BLD: 0.04 K/UL (ref 0–0.12)
BILIRUB SERPL-MCNC: 1 MG/DL (ref 0.1–1.5)
BUN SERPL-MCNC: 22 MG/DL (ref 8–22)
CALCIUM SERPL-MCNC: 9.1 MG/DL (ref 8.5–10.5)
CHLORIDE SERPL-SCNC: 104 MMOL/L (ref 96–112)
CO2 SERPL-SCNC: 28 MMOL/L (ref 20–33)
CREAT SERPL-MCNC: 1.17 MG/DL (ref 0.5–1.4)
EOSINOPHIL # BLD AUTO: 0.25 K/UL (ref 0–0.51)
EOSINOPHIL NFR BLD: 3.8 % (ref 0–6.9)
ERYTHROCYTE [DISTWIDTH] IN BLOOD BY AUTOMATED COUNT: 62.6 FL (ref 35.9–50)
ERYTHROCYTE [SEDIMENTATION RATE] IN BLOOD BY WESTERGREN METHOD: 12 MM/HOUR (ref 0–20)
FASTING STATUS PATIENT QL REPORTED: NORMAL
FOLATE SERPL-MCNC: >40 NG/ML
GLOBULIN SER CALC-MCNC: 3 G/DL (ref 1.9–3.5)
GLUCOSE SERPL-MCNC: 107 MG/DL (ref 65–99)
HCT VFR BLD AUTO: 44.9 % (ref 42–52)
HGB BLD-MCNC: 14.3 G/DL (ref 14–18)
IMM GRANULOCYTES # BLD AUTO: 0.03 K/UL (ref 0–0.11)
IMM GRANULOCYTES NFR BLD AUTO: 0.5 % (ref 0–0.9)
LYMPHOCYTES # BLD AUTO: 0.96 K/UL (ref 1–4.8)
LYMPHOCYTES NFR BLD: 14.5 % (ref 22–41)
MCH RBC QN AUTO: 31.3 PG (ref 27–33)
MCHC RBC AUTO-ENTMCNC: 31.8 G/DL (ref 33.7–35.3)
MCV RBC AUTO: 98.2 FL (ref 81.4–97.8)
MONOCYTES # BLD AUTO: 0.66 K/UL (ref 0–0.85)
MONOCYTES NFR BLD AUTO: 10 % (ref 0–13.4)
NEUTROPHILS # BLD AUTO: 4.67 K/UL (ref 1.82–7.42)
NEUTROPHILS NFR BLD: 70.6 % (ref 44–72)
NRBC # BLD AUTO: 0 K/UL
NRBC BLD-RTO: 0 /100 WBC
PLATELET # BLD AUTO: 94 K/UL (ref 164–446)
PMV BLD AUTO: 12 FL (ref 9–12.9)
POTASSIUM SERPL-SCNC: 4.2 MMOL/L (ref 3.6–5.5)
PROT SERPL-MCNC: 7 G/DL (ref 6–8.2)
RBC # BLD AUTO: 4.57 M/UL (ref 4.7–6.1)
SODIUM SERPL-SCNC: 142 MMOL/L (ref 135–145)
T4 FREE SERPL-MCNC: 1.54 NG/DL (ref 0.93–1.7)
TSH SERPL DL<=0.005 MIU/L-ACNC: 2.43 UIU/ML (ref 0.38–5.33)
VIT B12 SERPL-MCNC: 868 PG/ML (ref 211–911)
WBC # BLD AUTO: 6.6 K/UL (ref 4.8–10.8)

## 2021-04-22 PROCEDURE — 84439 ASSAY OF FREE THYROXINE: CPT

## 2021-04-22 PROCEDURE — 80053 COMPREHEN METABOLIC PANEL: CPT

## 2021-04-22 PROCEDURE — 36415 COLL VENOUS BLD VENIPUNCTURE: CPT

## 2021-04-22 PROCEDURE — 84443 ASSAY THYROID STIM HORMONE: CPT

## 2021-04-22 PROCEDURE — 85025 COMPLETE CBC W/AUTO DIFF WBC: CPT

## 2021-04-23 LAB
CERULOPLASMIN SERPL-MCNC: 25 MG/DL (ref 17–54)
COPPER SERPL-MCNC: 117.7 UG/DL (ref 70–140)
NUCLEAR IGG SER QL IA: NORMAL

## 2021-04-24 LAB
ARSENIC BLD-MCNC: <10 UG/L
CADMIUM BLD-MCNC: <1 UG/L
LEAD BLDV-MCNC: <2 UG/DL
MERCURY BLD-MCNC: <2.5 UG/L

## 2021-04-26 LAB
ALBUMIN SERPL ELPH-MCNC: 4.01 G/DL (ref 3.75–5.01)
ALPHA1 GLOB SERPL ELPH-MCNC: 0.31 G/DL (ref 0.19–0.46)
ALPHA2 GLOB SERPL ELPH-MCNC: 0.67 G/DL (ref 0.48–1.05)
B-GLOBULIN SERPL ELPH-MCNC: 0.78 G/DL (ref 0.48–1.1)
EER PROT ELECT SER Q1092: NORMAL
GAMMA GLOB SERPL ELPH-MCNC: 1.33 G/DL (ref 0.62–1.51)
INTERPRETATION SERPL IFE-IMP: NORMAL
PROT SERPL-MCNC: 7.1 G/DL (ref 6.3–8.2)

## 2021-06-04 ENCOUNTER — HOSPITAL ENCOUNTER (OUTPATIENT)
Dept: LAB | Facility: MEDICAL CENTER | Age: 71
End: 2021-06-04
Attending: FAMILY MEDICINE
Payer: MEDICARE

## 2021-06-04 ENCOUNTER — HOSPITAL ENCOUNTER (OUTPATIENT)
Dept: LAB | Facility: MEDICAL CENTER | Age: 71
End: 2021-06-04
Attending: PHYSICIAN ASSISTANT
Payer: MEDICARE

## 2021-06-04 LAB
ALBUMIN SERPL BCP-MCNC: 3.9 G/DL (ref 3.2–4.9)
ALBUMIN/GLOB SERPL: 1.3 G/DL
ALP SERPL-CCNC: 61 U/L (ref 30–99)
ALT SERPL-CCNC: 20 U/L (ref 2–50)
ANION GAP SERPL CALC-SCNC: 11 MMOL/L (ref 7–16)
AST SERPL-CCNC: 24 U/L (ref 12–45)
BILIRUB SERPL-MCNC: 0.9 MG/DL (ref 0.1–1.5)
BUN SERPL-MCNC: 30 MG/DL (ref 8–22)
CALCIUM SERPL-MCNC: 9 MG/DL (ref 8.5–10.5)
CHLORIDE SERPL-SCNC: 106 MMOL/L (ref 96–112)
CHOLEST SERPL-MCNC: 166 MG/DL (ref 100–199)
CO2 SERPL-SCNC: 28 MMOL/L (ref 20–33)
CREAT SERPL-MCNC: 1.06 MG/DL (ref 0.5–1.4)
FASTING STATUS PATIENT QL REPORTED: NORMAL
FASTING STATUS PATIENT QL REPORTED: NORMAL
GLOBULIN SER CALC-MCNC: 3.1 G/DL (ref 1.9–3.5)
GLUCOSE SERPL-MCNC: 106 MG/DL (ref 65–99)
HDLC SERPL-MCNC: 54 MG/DL
LDLC SERPL CALC-MCNC: 103 MG/DL
NT-PROBNP SERPL IA-MCNC: 240 PG/ML (ref 0–125)
POTASSIUM SERPL-SCNC: 3.7 MMOL/L (ref 3.6–5.5)
PROT SERPL-MCNC: 7 G/DL (ref 6–8.2)
SODIUM SERPL-SCNC: 145 MMOL/L (ref 135–145)
T4 FREE SERPL-MCNC: 1.4 NG/DL (ref 0.93–1.7)
TRIGL SERPL-MCNC: 46 MG/DL (ref 0–149)
TSH SERPL DL<=0.005 MIU/L-ACNC: 2.36 UIU/ML (ref 0.38–5.33)

## 2021-06-04 PROCEDURE — 80061 LIPID PANEL: CPT

## 2021-06-04 PROCEDURE — 83880 ASSAY OF NATRIURETIC PEPTIDE: CPT

## 2021-06-04 PROCEDURE — 36415 COLL VENOUS BLD VENIPUNCTURE: CPT

## 2021-06-04 PROCEDURE — 84443 ASSAY THYROID STIM HORMONE: CPT

## 2021-06-04 PROCEDURE — 84439 ASSAY OF FREE THYROXINE: CPT

## 2021-06-04 PROCEDURE — 80053 COMPREHEN METABOLIC PANEL: CPT

## 2021-08-16 ENCOUNTER — HOSPITAL ENCOUNTER (EMERGENCY)
Facility: MEDICAL CENTER | Age: 71
End: 2021-08-16
Attending: EMERGENCY MEDICINE
Payer: MEDICARE

## 2021-08-16 VITALS — OXYGEN SATURATION: 95 %

## 2021-08-16 DIAGNOSIS — I50.9 CONGESTIVE HEART FAILURE, UNSPECIFIED HF CHRONICITY, UNSPECIFIED HEART FAILURE TYPE (HCC): ICD-10-CM

## 2021-08-16 DIAGNOSIS — R23.0 CYANOSIS: ICD-10-CM

## 2021-08-16 DIAGNOSIS — R06.03 ACUTE RESPIRATORY DISTRESS: ICD-10-CM

## 2021-08-16 DIAGNOSIS — J44.9 CHRONIC OBSTRUCTIVE PULMONARY DISEASE, UNSPECIFIED COPD TYPE (HCC): ICD-10-CM

## 2021-08-16 DIAGNOSIS — I48.91 ATRIAL FIBRILLATION, UNSPECIFIED TYPE (HCC): ICD-10-CM

## 2021-08-16 DIAGNOSIS — I46.9 CARDIOPULMONARY ARREST (HCC): ICD-10-CM

## 2021-08-16 PROCEDURE — 31500 INSERT EMERGENCY AIRWAY: CPT

## 2021-08-16 PROCEDURE — 92950 HEART/LUNG RESUSCITATION CPR: CPT

## 2021-08-16 PROCEDURE — 99285 EMERGENCY DEPT VISIT HI MDM: CPT

## 2021-08-16 NOTE — ED NOTES
Pt BIB [facility] from private residence  Pt called c/o SOB, wife at residence    Pt was awake when EMS arrived but appeared cyanotic per EMS  Sp02 70%    Pt was assisted getting from second floor to ground floor, then became unresponsive and in a wide tachycardia with no pulses  CPR started at 1025  Pt arrived at 1042 - pt had just received epi and received 3 rounds of epi PTA    CPR continued upon arrival, pt arrived with an I/O in place  ACLS continued  ERP and RT at bedside  Pt intubated at 1050 with 7.5 tube, no pulses regained    1053 time of death

## 2021-08-16 NOTE — ED PROVIDER NOTES
ED Provider Note    ED Provider    Means of arrival: Paramedics  History obtained from: Paramedics  History limited by: Patient unresponsive    CHIEF COMPLAINT  No chief complaint on file.      HPI  Praveen Khalil is a 70 y.o. male who presents with a CPR in progress.  The patient was was having difficulty breathing.  Paramedics were called.  Paramedics state he was cyanotic, having respiratory difficulty.  He was hypoxic on room air was placed on oxygen.  After that he became unresponsive, he was in PEA with no pulses.  Patient had three rounds of epinephrine, and CPR for approximately 20 minutes on arrival here.  On arrival CPR was in progress, he had a Sukumar airway in, lung sounds were diminished bilaterally and he had significant cyanosis of the shoulders to the face.    REVIEW OF SYSTEMS  See HPI for further details. All other systems are negative.     PAST MEDICAL HISTORY       SOCIAL HISTORY  Social History     Tobacco Use   • Smoking status: Not on file   Substance and Sexual Activity   • Alcohol use: Not on file   • Drug use: Not on file   • Sexual activity: Not on file       SURGICAL HISTORY  patient denies any surgical history    CURRENT MEDICATIONS  Home Medications    **Home medications have not yet been reviewed for this encounter**         ALLERGIES  Not on File    PHYSICAL EXAM  Vital signs: Apneic, no pulse no blood pressure  Head no sign of injury  Pupils are constricted, minimally reactive  Lungs have decreased sounds throughout with Sukumar airway ventilation  Heart has no sounds  Skin there is significant cyanosis of the chest neck and face  Extremities he has no pulses femoral he he does not respond to painful stimuli      MEDICAL DECISION MAKING  This is a 70 y.o. male who presents with CPR in progress.  Additional CPR was continued, and additional epinephrine doses were given.  He remained with a pulseless electrical activity.  The monitor showed a wide-complex tachycardia.    Definitive  airway was obtained using a glide scope and with this there was no change in his condition or cyanosis.    After further CPR and medications were given ultrasound was used by me to determine cardiac activity no kidney activity is visualized with ultrasound.  No pulses are present the patient is apneic.  The code was called.    DIAGNOSTIC STUDIES / PROCEDURES    EKG      LABS      RADIOLOGY  No orders to display       COURSE  Pertinent Labs & Imaging studies reviewed. (See chart for details)        1118 patient's wife and family member arrived.  I did speak with them and explained to them that this patient had .  All questions were answered at this time.    The patient has a history of COPD, congestive heart failure atrial fibrillation and PE DVT.    The significant cyanosis that he had makes me concerned that this was related to cardiac failure.  As a cause of death.    He did not have any cough congestion or other Covid type symptoms    Procedure note  Endotracheal intubation: Need for definitive airway and ventilation  Patient comatose, unresponsive, no anesthesia required  [place] scope assist cords were visualized 7.5 mm airway inserted between the cords and balloon was inflated  Breath sounds remain diminished bilaterally but equal     awaiting   Patient released by     Critical care time 30 minutes    FINAL IMPRESSION  1. Cardiopulmonary arrest (HCC)    2. Cyanosis    3. Acute respiratory distress    4. Chronic obstructive pulmonary disease, unspecified COPD type (HCC)    5. Congestive heart failure, unspecified HF chronicity, unspecified heart failure type (HCC)    6. Atrial fibrillation, unspecified type (HCC)        The note accurately reflects work and decisions made by me.  Alejandro Suarez D.O.  2021  12:26 PM

## 2022-07-14 NOTE — PROGRESS NOTES
Subjective:      Praveen Khalil is a 69 y.o. male who presents with Abdominal Pain (urethra pain, sored, no burning x 1 week)            This is a new problem. Patient states he woke up with left sided groin pain that was relieved with OTC azo. Patient states his symptoms returned when the AZO wore off and he repeated the process three times. Patient states his pain also decreased after he was able to empty his bladder. Admits to getting up during the night to urinate, which is abnormal for him. Patient states he has had some constipation this morning. 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 Patient denies fever, chills, nausea, vomiting, back pain, or changes in his urine.     Abdominal Pain   Associated symptoms include constipation and frequency. Pertinent negatives include no fever, hematuria, nausea or vomiting.       Review of Systems   Constitutional: Negative for chills and fever.   Cardiovascular: Negative for palpitations.   Gastrointestinal: Positive for abdominal pain and constipation. Negative for nausea and vomiting.   Genitourinary: Positive for frequency. Negative for flank pain, hematuria and urgency.   Skin: Negative for rash.     PMH:  has a past medical history of Anesthesia, Aortic stenosis, Arthritis, Atrial fibrillation (HCC), Cataract, Congestive heart failure (HCC), Disorder of thyroid, Gout, Hypertension, Kidney stone, Psychiatric problem, Pulmonary embolism (HCC), and Sleep apnea.  MEDS:   Current Outpatient Medications:   •  metroNIDAZOLE (FLAGYL) 500 MG Tab, Take 1 Tab by mouth 3 times a day for 7 days., Disp: 21 Tab, Rfl: 0  •  levalbuterol (XOPENEX HFA) 45 MCG/ACT inhaler, Inhale 1-2 Puffs by mouth every four hours as needed for Shortness of Breath (wheezing)., Disp: 1 Inhaler, Rfl: 0  •  amiodarone (PACERONE) 100 MG tablet, , Disp: , Rfl:   •  levothyroxine (SYNTHROID) 75 MCG Tab, , Disp: , Rfl:   •  TOPROL  MG  TABLET SR 24 HR, , Disp: , Rfl:   •  rosuvastatin (CRESTOR) 10 MG Tab, , Disp: , Rfl:   •  levalbuterol (XOPENEX) 0.63 MG/3ML Nebu Soln, 3 mL by Nebulization route every four hours as needed for Shortness of Breath., Disp: 24 mL, Rfl: 0  •  Menthol (CEPACOL SORE THROAT MT), Spray  in mouth/throat., Disp: , Rfl:   •  aspirin (ASA) 81 MG Chew Tab chewable tablet, Take 81 mg by mouth every day., Disp: , Rfl:   •  Rosuvastatin Calcium (CRESTOR PO), Take  by mouth., Disp: , Rfl:   •  potassium chloride SA (KDUR) 20 MEQ Tab CR, Take 1 Tab by mouth 2 times a day., Disp: 180 Tab, Rfl: 3  •  calcium carbonate-vitamin D (OSCAL 500/200 D-3) 500-200 MG-UNIT Tab, Take 2 Tabs by mouth every morning with breakfast., Disp: , Rfl:   •  metoprolol (LOPRESSOR) 50 MG Tab, Take 75 mg by mouth 2 times a day., Disp: , Rfl:   •  Cholecalciferol (VITAMIN D) 2000 UNIT Tab, Take 2,000 Units by mouth every morning., Disp: , Rfl:   •  Coenzyme Q10 (CO Q-10 MAXIMUM STRENGTH) 400 MG Cap, Take 1 Cap by mouth every morning., Disp: , Rfl:   •  bumetanide (BUMEX) 1 MG Tab, Take 1 mg by mouth 2 times a day., Disp: , Rfl:   •  escitalopram (LEXAPRO) 10 MG Tab, Take 10 mg by mouth every day., Disp: , Rfl:   •  aliskiren (TEKTURNA) 300 MG tablet, Take 300 mg by mouth every day., Disp: , Rfl:   •  allopurinol (ZYLOPRIM) 300 MG TABS, Take 300 mg by mouth every morning., Disp: , Rfl:   •  Multiple Vitamins-Minerals (MULTIVITAL PO), Take 1 Tab by mouth every day., Disp: , Rfl:   •  GLUCOSAMINE MSM COMPLEX PO, Take 1 Tab by mouth every day., Disp: , Rfl:   •  fluticasone (FLONASE) 50 MCG/ACT nasal spray, Spray 1 Spray in nose every day. (Patient not taking: Reported on 7/27/2019), Disp: 16 g, Rfl: 0  •  benzonatate (TESSALON) 100 MG Cap, Take 1 Cap by mouth 3 times a day as needed for Cough. (Patient not taking: Reported on 7/27/2019), Disp: 60 Cap, Rfl: 0  •  amiodarone (PACERONE) 400 MG tablet, One tab by mouth twice daily until Feb 10; on that day change  "to one pill daily (Patient not taking: Reported on 7/27/2019), Disp: 40 Tab, Rfl: 0  •  levothyroxine (SYNTHROID) 50 MCG TABS, Take 50 mcg by mouth Every morning on an empty stomach., Disp: , Rfl:   ALLERGIES:   Allergies   Allergen Reactions   • Nkda [No Known Drug Allergy]      SURGHX:   Past Surgical History:   Procedure Laterality Date   • TRANSCATHETER AORTIC VALVE REPLACEMENT  1/9/2017    Procedure: TRANSCATHETER AORTIC VALVE REPLACEMENT WITH SCOUT;  Surgeon: Sidney Laguna M.D.;  Location: SURGERY [facility];  Service:    • DUPUYTREN CONTRACTURE RELEASE Right 2014   • HIP ARTHROPLASTY TOTAL  10/22/2013    Performed by Nader Barr M.D. at SURGERY [facility]   • LUMBAR LAMINECTOMY DISKECTOMY  2007   • CATARACT EXTRACTION WITH IOL     • TONSILLECTOMY       SOCHX:  reports that he quit smoking about 12 years ago. His smoking use included cigarettes and pipe. He has a 30.00 pack-year smoking history. He has never used smokeless tobacco. He reports that he does not drink alcohol or use drugs.  FH: family history includes No Known Problems in his father and mother.     Objective:     /78 (BP Location: Left arm, Patient Position: Sitting, BP Cuff Size: Adult)   Pulse 74   Temp 36.3 °C (97.3 °F) (Temporal)   Resp 20   Ht 1.905 m (6' 3\")   Wt (!) 147.4 kg (325 lb)   SpO2 94%   BMI 40.62 kg/m²      Physical Exam   Constitutional: He is oriented to person, place, and time. He appears well-developed and well-nourished.   HENT:   Head: Normocephalic and atraumatic.   Cardiovascular: Normal rate, regular rhythm and normal heart sounds.   Pulmonary/Chest: Effort normal and breath sounds normal.   Abdominal: Soft. Bowel sounds are normal. There is tenderness in the left lower quadrant. There is guarding.   LLQ, guarding and tenderness  No hernias noted  diasasis recti noticed   Neurological: He is alert and oriented to person, place, and time.   Skin: Skin is warm and dry.   Psychiatric: He has a " normal mood and affect. His behavior is normal. Judgment and thought content normal.   Vitals reviewed.         History and examination performed by me, Dr. David Sweet.  Scribed by TUNDE Marin     Assessment/Plan:     1. Left lower quadrant abdominal pain  Negative blood, LE- POCT Urinalysis  - URINE CULTURE(NEW); Future  - CBC WITH DIFFERENTIAL; Future  - Comp Metabolic Panel; Future  - CT-ABDOMEN-PELVIS W/; Future  Advised bowel rest; Tylenol PRN.  Advised ED evaluation if any worsening condition.  Provided prescription for Flagyl pending CT report.   no

## (undated) DEVICE — SENSOR SPO2 ADULT LNCS ADTX (20/BX) ORDER ITEM #19593

## (undated) DEVICE — TUBING CLEARLINK DUO-VENT - C-FLO (48EA/CA)

## (undated) DEVICE — TUBE CONNECTING SUCTION - CLEAR PLASTIC STERILE 72 IN (50EA/CA)

## (undated) DEVICE — CAPTIVATOR II-10MM ROUND STIFF  (40/BX)

## (undated) DEVICE — SET EXTENSION WITH 2 PORTS (48EA/CA) ***PART #2C8610 IS A SUBSTITUTE*****

## (undated) DEVICE — KIT  I.V. START (100EA/CA)

## (undated) DEVICE — SYRINGE SAFETY 10 ML 18 GA X 1 1/2 BLUNT LL (100/BX 4BX/CA)

## (undated) DEVICE — LACTATED RINGERS INJ 1000 ML - (14EA/CA 60CA/PF)

## (undated) DEVICE — GOWN SURGEONS LARGE - (32/CA)

## (undated) DEVICE — KIT CUSTOM PROCEDURE SINGLE FOR ENDO  (15/CA)

## (undated) DEVICE — NEPTUNE 4 PORT MANIFOLD - (20/PK)

## (undated) DEVICE — ELECTRODE 850 FOAM ADHESIVE - HYDROGEL RADIOTRNSPRNT (50/PK)

## (undated) DEVICE — CANISTER SUCTION RIGID RED 1500CC (40EA/CA)

## (undated) DEVICE — SYRINGE SAFETY 3 ML 18 GA X 1 1/2 BLUNT LL (100/BX 8BX/CA)

## (undated) DEVICE — CATHETER IV SAFETY 20 GA X 1-1/4 (50/BX)

## (undated) DEVICE — PAD PREP 24 X 48 CUFFED - (100/CA)

## (undated) DEVICE — SYRINGE SAFETY 5 ML 18 GA X 1-1/2 BLUNT LL (100/BX 4BX/CA)

## (undated) DEVICE — SPONGE GAUZE NON-STERILE 4X4 - (2000/CA 10PK/CA)

## (undated) DEVICE — FORCEP RADIAL JAW 4 STANDARD CAPACITY W/NEEDLE 240CM (40EA/BX)

## (undated) DEVICE — GLOVE, LITE (PAIR)